# Patient Record
Sex: MALE | Race: WHITE | NOT HISPANIC OR LATINO | Employment: OTHER | URBAN - NONMETROPOLITAN AREA
[De-identification: names, ages, dates, MRNs, and addresses within clinical notes are randomized per-mention and may not be internally consistent; named-entity substitution may affect disease eponyms.]

---

## 2017-01-19 DIAGNOSIS — I10 ESSENTIAL HYPERTENSION: Chronic | ICD-10-CM

## 2017-01-19 RX ORDER — METOPROLOL SUCCINATE 25 MG/1
25 TABLET, EXTENDED RELEASE ORAL DAILY
Qty: 90 TABLET | Refills: 3 | Status: SHIPPED | OUTPATIENT
Start: 2017-01-19 | End: 2017-09-11 | Stop reason: SDUPTHER

## 2017-01-19 NOTE — TELEPHONE ENCOUNTER
Patient dosage of the Metoprolol Succ ER is 25mg, to take 1 tablet(s)(25mg) po qday. Patient previously had a 50mg tablet and was taking one-half tab to equal the 25mg dosage.

## 2017-01-24 ENCOUNTER — TRANSCRIBE ORDERS (OUTPATIENT)
Dept: ULTRASOUND IMAGING | Facility: CLINIC | Age: 72
End: 2017-01-24

## 2017-01-24 ENCOUNTER — LAB (OUTPATIENT)
Dept: LAB | Facility: OTHER | Age: 72
End: 2017-01-24

## 2017-01-24 ENCOUNTER — TRANSCRIBE ORDERS (OUTPATIENT)
Dept: LAB | Facility: OTHER | Age: 72
End: 2017-01-24

## 2017-01-24 DIAGNOSIS — Z12.5 SPECIAL SCREENING FOR MALIGNANT NEOPLASM OF PROSTATE: Primary | ICD-10-CM

## 2017-01-24 DIAGNOSIS — N39.0 URINARY TRACT INFECTION, SITE NOT SPECIFIED: ICD-10-CM

## 2017-01-24 DIAGNOSIS — I12.9 HYPERTENSIVE NEPHROPATHY, STAGE 1-4 OR UNSPECIFIED CHRONIC KIDNEY DISEASE: ICD-10-CM

## 2017-01-24 DIAGNOSIS — E78.5 HYPERLIPIDEMIA, UNSPECIFIED HYPERLIPIDEMIA TYPE: ICD-10-CM

## 2017-01-24 DIAGNOSIS — Z12.5 SPECIAL SCREENING FOR MALIGNANT NEOPLASM OF PROSTATE: ICD-10-CM

## 2017-01-24 DIAGNOSIS — N18.30 CHRONIC KIDNEY DISEASE, STAGE III (MODERATE) (HCC): ICD-10-CM

## 2017-01-24 DIAGNOSIS — D64.9 ANEMIA, UNSPECIFIED: ICD-10-CM

## 2017-01-24 DIAGNOSIS — I10 ESSENTIAL HYPERTENSION, MALIGNANT: ICD-10-CM

## 2017-01-24 DIAGNOSIS — I12.9 HYPERTENSIVE NEPHROPATHY, STAGE 1-4 OR UNSPECIFIED CHRONIC KIDNEY DISEASE: Primary | ICD-10-CM

## 2017-01-24 DIAGNOSIS — N18.30 CHRONIC KIDNEY DISEASE, STAGE III (MODERATE) (HCC): Primary | ICD-10-CM

## 2017-01-24 LAB
ALBUMIN SERPL-MCNC: 4 G/DL (ref 3.2–5.5)
ALBUMIN/GLOB SERPL: 1 G/DL (ref 1–3)
ALP SERPL-CCNC: 73 U/L (ref 15–121)
ALT SERPL W P-5'-P-CCNC: 24 U/L (ref 10–60)
ANION GAP SERPL CALCULATED.3IONS-SCNC: 12 MMOL/L (ref 5–15)
AST SERPL-CCNC: 28 U/L (ref 10–60)
BILIRUB SERPL-MCNC: 1 MG/DL (ref 0.2–1)
BUN BLD-MCNC: 29 MG/DL (ref 8–25)
BUN/CREAT SERPL: 19.3 (ref 7–25)
CALCIUM SPEC-SCNC: 9.9 MG/DL (ref 8.4–10.8)
CHLORIDE SERPL-SCNC: 99 MMOL/L (ref 100–112)
CO2 SERPL-SCNC: 30 MMOL/L (ref 20–32)
CREAT BLD-MCNC: 1.5 MG/DL (ref 0.4–1.3)
GFR SERPL CREATININE-BSD FRML MDRD: 46 ML/MIN/1.73 (ref 49–113)
GLOBULIN UR ELPH-MCNC: 3.9 GM/DL (ref 2.5–4.6)
GLUCOSE BLD-MCNC: 109 MG/DL (ref 70–100)
HCT VFR BLD AUTO: 41.2 % (ref 39–49)
HGB BLD-MCNC: 13.4 G/DL (ref 13.7–17.3)
MAGNESIUM SERPL-MCNC: 1.5 MG/DL (ref 1.8–2.5)
PHOSPHATE SERPL-MCNC: 3.4 MG/DL (ref 2.5–4.6)
POTASSIUM BLD-SCNC: 4.1 MMOL/L (ref 3.4–5.4)
PROT SERPL-MCNC: 7.9 G/DL (ref 6.7–8.2)
SODIUM BLD-SCNC: 141 MMOL/L (ref 134–146)
URATE SERPL-MCNC: 10.7 MG/DL (ref 2.6–7.2)

## 2017-01-24 PROCEDURE — 84100 ASSAY OF PHOSPHORUS: CPT | Performed by: INTERNAL MEDICINE

## 2017-01-24 PROCEDURE — 85018 HEMOGLOBIN: CPT | Performed by: INTERNAL MEDICINE

## 2017-01-24 PROCEDURE — 85014 HEMATOCRIT: CPT | Performed by: INTERNAL MEDICINE

## 2017-01-24 PROCEDURE — 84550 ASSAY OF BLOOD/URIC ACID: CPT | Performed by: INTERNAL MEDICINE

## 2017-01-24 PROCEDURE — 82570 ASSAY OF URINE CREATININE: CPT | Performed by: INTERNAL MEDICINE

## 2017-01-24 PROCEDURE — 84155 ASSAY OF PROTEIN SERUM: CPT | Performed by: INTERNAL MEDICINE

## 2017-01-24 PROCEDURE — 84165 PROTEIN E-PHORESIS SERUM: CPT | Performed by: INTERNAL MEDICINE

## 2017-01-24 PROCEDURE — 82306 VITAMIN D 25 HYDROXY: CPT | Performed by: INTERNAL MEDICINE

## 2017-01-24 PROCEDURE — 80053 COMPREHEN METABOLIC PANEL: CPT | Performed by: INTERNAL MEDICINE

## 2017-01-24 PROCEDURE — 84156 ASSAY OF PROTEIN URINE: CPT | Performed by: INTERNAL MEDICINE

## 2017-01-24 PROCEDURE — 84153 ASSAY OF PSA TOTAL: CPT | Performed by: INTERNAL MEDICINE

## 2017-01-24 PROCEDURE — 83735 ASSAY OF MAGNESIUM: CPT | Performed by: INTERNAL MEDICINE

## 2017-01-24 PROCEDURE — 36415 COLL VENOUS BLD VENIPUNCTURE: CPT | Performed by: INTERNAL MEDICINE

## 2017-01-24 PROCEDURE — 83970 ASSAY OF PARATHORMONE: CPT | Performed by: INTERNAL MEDICINE

## 2017-01-25 LAB
25(OH)D3 SERPL-MCNC: 30.6 NG/ML (ref 30–100)
PSA SERPL-MCNC: 1.09 NG/ML (ref 0–4)

## 2017-01-26 LAB
ALBUMIN SERPL-MCNC: 3.8 G/DL (ref 2.9–4.4)
ALBUMIN/GLOB SERPL: 1.1 {RATIO} (ref 0.7–1.7)
ALPHA1 GLOB FLD ELPH-MCNC: 0.2 G/DL (ref 0–0.4)
ALPHA2 GLOB SERPL ELPH-MCNC: 0.9 G/DL (ref 0.4–1)
B-GLOBULIN SERPL ELPH-MCNC: 1.1 G/DL (ref 0.7–1.3)
CREAT 24H UR-MCNC: 134.3 MG/DL
GAMMA GLOB SERPL ELPH-MCNC: 1.4 G/DL (ref 0.4–1.8)
GLOBULIN SER CALC-MCNC: 3.5 G/DL (ref 2.2–3.9)
Lab: ABNORMAL
M-SPIKE: 0.6 G/DL
PROT PATTERN SERPL ELPH-IMP: ABNORMAL
PROT SERPL-MCNC: 7.3 G/DL (ref 6–8.5)
PROT UR-MCNC: 9.8 MG/DL
PROT/CREAT UR: 73 MG/G CREAT (ref 0–200)
PTH-INTACT SERPL-MCNC: 28 PG/ML (ref 15–65)

## 2017-03-10 ENCOUNTER — OFFICE VISIT (OUTPATIENT)
Dept: FAMILY MEDICINE CLINIC | Facility: CLINIC | Age: 72
End: 2017-03-10

## 2017-03-10 VITALS
SYSTOLIC BLOOD PRESSURE: 148 MMHG | HEART RATE: 80 BPM | WEIGHT: 241 LBS | DIASTOLIC BLOOD PRESSURE: 80 MMHG | BODY MASS INDEX: 35.7 KG/M2 | HEIGHT: 69 IN

## 2017-03-10 DIAGNOSIS — K21.9 GASTROESOPHAGEAL REFLUX DISEASE WITHOUT ESOPHAGITIS: Chronic | ICD-10-CM

## 2017-03-10 DIAGNOSIS — R73.02 IMPAIRED GLUCOSE TOLERANCE: Primary | Chronic | ICD-10-CM

## 2017-03-10 DIAGNOSIS — Z11.59 NEED FOR HEPATITIS C SCREENING TEST: ICD-10-CM

## 2017-03-10 DIAGNOSIS — N18.1 CHRONIC RENAL IMPAIRMENT, STAGE 1: Chronic | ICD-10-CM

## 2017-03-10 DIAGNOSIS — E78.2 MIXED HYPERLIPIDEMIA: Chronic | ICD-10-CM

## 2017-03-10 DIAGNOSIS — I10 ESSENTIAL HYPERTENSION: Chronic | ICD-10-CM

## 2017-03-10 PROCEDURE — 99214 OFFICE O/P EST MOD 30 MIN: CPT | Performed by: INTERNAL MEDICINE

## 2017-03-10 RX ORDER — LOSARTAN POTASSIUM AND HYDROCHLOROTHIAZIDE 25; 100 MG/1; MG/1
1 TABLET ORAL DAILY
Qty: 90 TABLET | Refills: 3 | Status: SHIPPED | OUTPATIENT
Start: 2017-03-10 | End: 2017-09-11 | Stop reason: SDUPTHER

## 2017-03-10 RX ORDER — ALLOPURINOL 300 MG/1
1 TABLET ORAL DAILY
Refills: 4 | COMMUNITY
Start: 2017-02-01 | End: 2017-09-11 | Stop reason: SDUPTHER

## 2017-03-10 RX ORDER — FERROUS SULFATE 325(65) MG
325 TABLET ORAL
COMMUNITY

## 2017-03-10 RX ORDER — FLUTICASONE PROPIONATE 50 MCG
2 SPRAY, SUSPENSION (ML) NASAL DAILY PRN
COMMUNITY
End: 2022-07-15 | Stop reason: SDUPTHER

## 2017-03-10 NOTE — PATIENT INSTRUCTIONS

## 2017-03-10 NOTE — PROGRESS NOTES
Subjective   Noe Barnes Sr. is a 71 y.o. male who presents to the office for follow-up and review of labs.  He had some labs performed by his nephrologist in January, but forgot to have his routine labs prior to today's appointment.  He has hypertension and his blood pressure has been controlled, although it is a little elevated today.  He has GERD which is well controlled with Protonix.  He has impaired glucose tolerance and has been monitoring his dietary intake of sugars and carbohydrates.  He has hyperlipidemia and takes pravastatin daily.  He has iron deficient anemia and is supposed to be taking an iron supplement daily.  He has renal insufficiency and follows with nephrology.  He had a nephrology appointment in January.  His renal function is stable.  His magnesium was a little low and he was started on a magnesium supplement.      History of Present Illness     The following portions of the patient's history were reviewed and updated as appropriate: allergies, current medications, past family history, past medical history, past social history, past surgical history and problem list.    Review of Systems   Constitutional: Negative for chills, fatigue and fever.   HENT: Negative for congestion, sneezing, sore throat and trouble swallowing.    Eyes: Negative for visual disturbance.   Respiratory: Negative for cough, chest tightness, shortness of breath and wheezing.    Cardiovascular: Negative for chest pain, palpitations and leg swelling.   Gastrointestinal: Negative for abdominal pain, constipation, diarrhea, nausea and vomiting.   Genitourinary: Negative for dysuria, frequency and urgency.   Musculoskeletal: Negative for neck pain.   Skin: Negative for rash.   Neurological: Negative for dizziness, weakness and headaches.   Psychiatric/Behavioral:        Patient denies any feelings of depression and has not felt down, hopeless or lost interest in any activities.   All other systems reviewed and are  negative.      Objective   Physical Exam   Constitutional: He is oriented to person, place, and time. He appears well-developed and well-nourished. No distress.   HENT:   Head: Normocephalic and atraumatic.   Nose: Nose normal.   Mouth/Throat: Oropharynx is clear and moist. No oropharyngeal exudate.   Eyes: Conjunctivae and EOM are normal. Pupils are equal, round, and reactive to light. No scleral icterus.   Neck: Normal range of motion. Neck supple.   Cardiovascular: Normal rate, regular rhythm and normal heart sounds.  Exam reveals no gallop and no friction rub.    No murmur heard.  Pulmonary/Chest: Effort normal and breath sounds normal. No respiratory distress. He has no wheezes. He has no rales.   Abdominal: Soft. Bowel sounds are normal. He exhibits no distension. There is no tenderness. There is no rebound and no guarding.   Musculoskeletal: Normal range of motion. He exhibits no edema.   Lymphadenopathy:     He has no cervical adenopathy.   Neurological: He is alert and oriented to person, place, and time. No cranial nerve deficit.   Skin: Skin is warm and dry. No rash noted.   Psychiatric: He has a normal mood and affect. His behavior is normal. Judgment and thought content normal.   Nursing note and vitals reviewed.      Assessment/Plan   Noe was seen today for hypertension.    Diagnoses and all orders for this visit:    Impaired glucose tolerance    Essential hypertension  -     losartan-hydrochlorothiazide (HYZAAR) 100-25 MG per tablet; Take 1 tablet by mouth Daily.    Mixed hyperlipidemia    Chronic renal impairment, stage 1    Gastroesophageal reflux disease without esophagitis         Labs are reviewed with patient.  I reviewed labs which he had done in January.  He will come in one day next week to have the remainder of his labs updated.Patient's glucose is slightly elevated.  Patient will continue to watch diet to help maintain control of the glucose.  Patient understands that there is an  increased risk of developing diabetes in the future.  For now he will continue with pravastatin for treatment of his hyperlipidemia.  His creatinine is elevated but stable at 1.5.  He will continue with Protonix for treatment of GERD.  His blood pressure is a little elevated today.  He will continue with his current blood pressure medication.    Patients BMI indicates that he is overweight.  I discussed the importance of weight loss, and have recommended a diet and exercise regimen.      PHQ-9 Depression Screening 3/10/2017   Little interest or pleasure in doing things 0   Feeling down, depressed, or hopeless 0   Trouble falling or staying asleep, or sleeping too much 3   Feeling tired or having little energy 3   Poor appetite or overeating 0   Feeling bad about yourself - or that you are a failure or have let yourself or your family down 0   Trouble concentrating on things, such as reading the newspaper or watching television 0   Moving or speaking so slowly that other people could have noticed. Or the opposite - being so fidgety or restless that you have been moving around a lot more than usual 0   Thoughts that you would be better off dead, or of hurting yourself in some way 0   PHQ-9 Total Score 6   If you checked off any problems, how difficult have these problems made it for you to do your work, take care of things at home, or get along with other people? Not difficult at all         Lab on 01/24/2017   Component Date Value Ref Range Status   • Glucose 01/24/2017 109* 70 - 100 mg/dL Final   • BUN 01/24/2017 29* 8 - 25 mg/dL Final   • Creatinine 01/24/2017 1.50* 0.40 - 1.30 mg/dL Final   • Sodium 01/24/2017 141  134 - 146 mmol/L Final   • Potassium 01/24/2017 4.1  3.4 - 5.4 mmol/L Final   • Chloride 01/24/2017 99* 100 - 112 mmol/L Final   • CO2 01/24/2017 30.0  20.0 - 32.0 mmol/L Final   • Calcium 01/24/2017 9.9  8.4 - 10.8 mg/dL Final   • Total Protein 01/24/2017 7.9  6.7 - 8.2 g/dL Final   • Albumin  01/24/2017 4.00  3.20 - 5.50 g/dL Final   • ALT (SGPT) 01/24/2017 24  10 - 60 U/L Final   • AST (SGOT) 01/24/2017 28  10 - 60 U/L Final   • Alkaline Phosphatase 01/24/2017 73  15 - 121 U/L Final   • Total Bilirubin 01/24/2017 1.0  0.2 - 1.0 mg/dL Final   • eGFR Non  Amer 01/24/2017 46* 49 - 113 mL/min/1.73 Final   • Globulin 01/24/2017 3.9  2.5 - 4.6 gm/dL Final   • A/G Ratio 01/24/2017 1.0  1.0 - 3.0 g/dL Final   • BUN/Creatinine Ratio 01/24/2017 19.3  7.0 - 25.0 Final   • Anion Gap 01/24/2017 12.0  5.0 - 15.0 mmol/L Final   • Hemoglobin 01/24/2017 13.4* 13.7 - 17.3 g/dL Final   • Hematocrit 01/24/2017 41.2  39.0 - 49.0 % Final   • Magnesium 01/24/2017 1.5* 1.8 - 2.5 mg/dL Final   • Phosphorus 01/24/2017 3.4  2.5 - 4.6 mg/dL Final   • Uric Acid 01/24/2017 10.7* 2.6 - 7.2 mg/dL Final   • 25 Hydroxy, Vitamin D 01/24/2017 30.6  30.0 - 100.0 ng/ml Final   • PTH, Intact 01/24/2017 28  15 - 65 pg/mL Final   • Total Protein 01/24/2017 7.3  6.0 - 8.5 g/dL Final   • Albumin 01/24/2017 3.8  2.9 - 4.4 g/dL Final   • Alpha-1-Globulin 01/24/2017 0.2  0.0 - 0.4 g/dL Final   • Alpha-2-Globulin 01/24/2017 0.9  0.4 - 1.0 g/dL Final   • Beta Globulin 01/24/2017 1.1  0.7 - 1.3 g/dL Final   • Gamma Globulin 01/24/2017 1.4  0.4 - 1.8 g/dL Final   • M-Jose 01/24/2017 0.6* Not Observed g/dL Final   • Globulin 01/24/2017 3.5  2.2 - 3.9 g/dL Final   • A/G Ratio 01/24/2017 1.1  0.7 - 1.7 Final   • Please note 01/24/2017 Comment   Final    Protein electrophoresis scan will follow via computer, mail, or   delivery.   • SPE Interpretation 01/24/2017 Comment   Final    The SPE pattern demonstrates a single peak (M-spike) in the gamma  region which may represent monoclonal protein. This peak may also be  caused by circulating immune complexes, cryoglobulins, C-reactive  protein, fibrinogen or hemolysis.  If clinically indicated, the  presence of a monoclonal gammopathy may be confirmed by immuno-  fixation, as well as an  evaluation of the urine for the presence of  Bence-Trevino protein.   • Creatinine, Urine 01/24/2017 134.3  Not Estab. mg/dL Final   • Total Protein, Urine 01/24/2017 9.8  Not Estab. mg/dL Final   • Protein/Creatinine Ratio 01/24/2017 73  0 - 200 mg/g creat Final   • PSA 01/24/2017 1.090  0.000 - 4.000 ng/mL Final   ]

## 2017-03-14 PROCEDURE — 80074 ACUTE HEPATITIS PANEL: CPT | Performed by: INTERNAL MEDICINE

## 2017-03-15 ENCOUNTER — OFFICE VISIT (OUTPATIENT)
Dept: SURGERY | Facility: CLINIC | Age: 72
End: 2017-03-15

## 2017-03-15 VITALS
HEIGHT: 68 IN | SYSTOLIC BLOOD PRESSURE: 162 MMHG | DIASTOLIC BLOOD PRESSURE: 78 MMHG | BODY MASS INDEX: 36.68 KG/M2 | WEIGHT: 242 LBS

## 2017-03-15 DIAGNOSIS — K63.5 COLON POLYPS: Primary | ICD-10-CM

## 2017-03-15 DIAGNOSIS — K64.4 SKIN TAGS, ANUS OR RECTUM: ICD-10-CM

## 2017-03-15 PROCEDURE — 99212 OFFICE O/P EST SF 10 MIN: CPT | Performed by: SURGERY

## 2017-03-15 NOTE — PROGRESS NOTES
See prior note.  Patient comes back for repeat anal exam revealed what was felt to be likely benign anal skin tags.  Patient is doing well.  He also had a is adenomatous polyp removed on colonoscopy 3 months ago and he is set to have repeat colonoscopy a year out for that.  He comes in today just allow us to take a look at his anus in to confirm these skin tags.  He has no complaints.  His perianal area significant for what appeared to be some benign skin tags.  No ulcerations or concerning lesions appreciated otherwise.  Patient will follow up with us in 9 months or sooner as a further concerns or questions.

## 2017-03-17 LAB
HAV IGM SERPL QL IA: NEGATIVE
HBV CORE IGM SERPL QL IA: NEGATIVE
HBV SURFACE AG SERPL QL IA: NEGATIVE
HCV AB SER DONR QL: NEGATIVE

## 2017-03-30 ENCOUNTER — TELEPHONE (OUTPATIENT)
Dept: FAMILY MEDICINE CLINIC | Facility: CLINIC | Age: 72
End: 2017-03-30

## 2017-03-30 RX ORDER — NITROFURANTOIN 25; 75 MG/1; MG/1
100 CAPSULE ORAL 2 TIMES DAILY
Qty: 14 CAPSULE | Refills: 0 | Status: SHIPPED | OUTPATIENT
Start: 2017-03-30 | End: 2017-04-24

## 2017-04-17 PROCEDURE — 86666 EHRLICHIA ANTIBODY: CPT | Performed by: PHYSICIAN ASSISTANT

## 2017-04-17 PROCEDURE — 87086 URINE CULTURE/COLONY COUNT: CPT | Performed by: PHYSICIAN ASSISTANT

## 2017-04-17 PROCEDURE — 86618 LYME DISEASE ANTIBODY: CPT | Performed by: PHYSICIAN ASSISTANT

## 2017-04-19 ENCOUNTER — TRANSCRIBE ORDERS (OUTPATIENT)
Dept: GENERAL RADIOLOGY | Facility: CLINIC | Age: 72
End: 2017-04-19

## 2017-04-19 DIAGNOSIS — N39.0 URINARY TRACT INFECTION, SITE NOT SPECIFIED: Primary | ICD-10-CM

## 2017-04-20 ENCOUNTER — LAB (OUTPATIENT)
Dept: LAB | Facility: OTHER | Age: 72
End: 2017-04-20

## 2017-04-20 DIAGNOSIS — N39.0 URINARY TRACT INFECTION, SITE NOT SPECIFIED: ICD-10-CM

## 2017-04-20 LAB
BACTERIA UR QL AUTO: ABNORMAL /HPF
BILIRUB UR QL STRIP: NEGATIVE
CLARITY UR: ABNORMAL
COLOR UR: YELLOW
GLUCOSE UR STRIP-MCNC: NEGATIVE MG/DL
HGB UR QL STRIP.AUTO: ABNORMAL
HYALINE CASTS UR QL AUTO: ABNORMAL /LPF
KETONES UR QL STRIP: NEGATIVE
LEUKOCYTE ESTERASE UR QL STRIP.AUTO: ABNORMAL
MUCOUS THREADS URNS QL MICRO: ABNORMAL /HPF
NITRITE UR QL STRIP: NEGATIVE
PH UR STRIP.AUTO: 5.5 [PH] (ref 5.5–8)
PROT UR QL STRIP: NEGATIVE
RBC # UR: ABNORMAL /HPF
REF LAB TEST METHOD: ABNORMAL
SP GR UR STRIP: 1.02 (ref 1–1.03)
SQUAMOUS #/AREA URNS HPF: ABNORMAL /HPF
UROBILINOGEN UR QL STRIP: ABNORMAL
WBC UR QL AUTO: ABNORMAL /HPF

## 2017-04-20 PROCEDURE — 87086 URINE CULTURE/COLONY COUNT: CPT | Performed by: PHYSICIAN ASSISTANT

## 2017-04-20 PROCEDURE — 81001 URINALYSIS AUTO W/SCOPE: CPT | Performed by: PHYSICIAN ASSISTANT

## 2017-04-22 LAB — BACTERIA SPEC AEROBE CULT: NORMAL

## 2017-04-24 ENCOUNTER — OFFICE VISIT (OUTPATIENT)
Dept: FAMILY MEDICINE CLINIC | Facility: CLINIC | Age: 72
End: 2017-04-24

## 2017-04-24 VITALS
BODY MASS INDEX: 35.55 KG/M2 | WEIGHT: 240 LBS | HEART RATE: 88 BPM | DIASTOLIC BLOOD PRESSURE: 70 MMHG | HEIGHT: 69 IN | SYSTOLIC BLOOD PRESSURE: 138 MMHG | TEMPERATURE: 98.2 F
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
## 2017-04-24 DIAGNOSIS — N39.0 URINARY TRACT INFECTION, SITE UNSPECIFIED: ICD-10-CM

## 2017-04-24 DIAGNOSIS — B37.2 CUTANEOUS CANDIDIASIS: ICD-10-CM

## 2017-04-24 DIAGNOSIS — R60.9 PERIPHERAL EDEMA: ICD-10-CM

## 2017-04-24 PROCEDURE — 99213 OFFICE O/P EST LOW 20 MIN: CPT | Performed by: INTERNAL MEDICINE

## 2017-04-24 RX ORDER — FLUCONAZOLE 100 MG/1
100 TABLET ORAL DAILY
Qty: 7 TABLET | Refills: 0 | Status: SHIPPED | OUTPATIENT
Start: 2017-04-24 | End: 2017-05-01

## 2017-07-31 DIAGNOSIS — I10 ESSENTIAL HYPERTENSION: Chronic | ICD-10-CM

## 2017-07-31 RX ORDER — LOSARTAN POTASSIUM AND HYDROCHLOROTHIAZIDE 25; 100 MG/1; MG/1
TABLET ORAL
Qty: 90 TABLET | Refills: 0 | Status: SHIPPED | OUTPATIENT
Start: 2017-07-31 | End: 2017-09-11 | Stop reason: SDUPTHER

## 2017-08-04 ENCOUNTER — LAB (OUTPATIENT)
Dept: LAB | Facility: OTHER | Age: 72
End: 2017-08-04

## 2017-08-04 ENCOUNTER — TRANSCRIBE ORDERS (OUTPATIENT)
Dept: GENERAL RADIOLOGY | Facility: CLINIC | Age: 72
End: 2017-08-04

## 2017-08-04 DIAGNOSIS — N28.1 ACQUIRED CYST OF KIDNEY: ICD-10-CM

## 2017-08-04 DIAGNOSIS — E83.42 HYPOMAGNESEMIA: ICD-10-CM

## 2017-08-04 DIAGNOSIS — E78.5 HYPERLIPIDEMIA, UNSPECIFIED HYPERLIPIDEMIA TYPE: ICD-10-CM

## 2017-08-04 DIAGNOSIS — I10 ESSENTIAL HYPERTENSION, MALIGNANT: ICD-10-CM

## 2017-08-04 DIAGNOSIS — N39.0 URINARY TRACT INFECTION, SITE NOT SPECIFIED: ICD-10-CM

## 2017-08-04 DIAGNOSIS — N18.9 CHRONIC KIDNEY DISEASE, UNSPECIFIED: ICD-10-CM

## 2017-08-04 DIAGNOSIS — E79.0 URICACIDEMIA: ICD-10-CM

## 2017-08-04 DIAGNOSIS — D64.9 ANEMIA, UNSPECIFIED: ICD-10-CM

## 2017-08-04 DIAGNOSIS — N18.30 CHRONIC KIDNEY DISEASE, STAGE III (MODERATE) (HCC): ICD-10-CM

## 2017-08-04 DIAGNOSIS — I12.9 HYPERTENSIVE NEPHROPATHY, STAGE 1-4 OR UNSPECIFIED CHRONIC KIDNEY DISEASE: ICD-10-CM

## 2017-08-04 DIAGNOSIS — N18.30 CHRONIC KIDNEY DISEASE, STAGE III (MODERATE) (HCC): Primary | ICD-10-CM

## 2017-08-04 LAB
ALBUMIN SERPL-MCNC: 3.7 G/DL (ref 3.2–5.5)
ALBUMIN/GLOB SERPL: 1.1 G/DL (ref 1–3)
ALP SERPL-CCNC: 83 U/L (ref 15–121)
ALT SERPL W P-5'-P-CCNC: 30 U/L (ref 10–60)
ANION GAP SERPL CALCULATED.3IONS-SCNC: 13 MMOL/L (ref 5–15)
AST SERPL-CCNC: 34 U/L (ref 10–60)
BASOPHILS # BLD AUTO: 0.02 10*3/MM3 (ref 0–0.2)
BASOPHILS NFR BLD AUTO: 0.2 % (ref 0–2)
BILIRUB SERPL-MCNC: 1.2 MG/DL (ref 0.2–1)
BUN BLD-MCNC: 25 MG/DL (ref 8–25)
BUN/CREAT SERPL: 16.7 (ref 7–25)
CALCIUM SPEC-SCNC: 9 MG/DL (ref 8.4–10.8)
CHLORIDE SERPL-SCNC: 99 MMOL/L (ref 100–112)
CO2 SERPL-SCNC: 29 MMOL/L (ref 20–32)
CREAT BLD-MCNC: 1.5 MG/DL (ref 0.4–1.3)
CREAT UR-MCNC: 160.6 MG/DL
DEPRECATED RDW RBC AUTO: 52.5 FL (ref 35.1–43.9)
EOSINOPHIL # BLD AUTO: 0.24 10*3/MM3 (ref 0–0.7)
EOSINOPHIL NFR BLD AUTO: 2.8 % (ref 0–7)
ERYTHROCYTE [DISTWIDTH] IN BLOOD BY AUTOMATED COUNT: 14.6 % (ref 11.5–14.5)
GFR SERPL CREATININE-BSD FRML MDRD: 46 ML/MIN/1.73 (ref 49–113)
GLOBULIN UR ELPH-MCNC: 3.4 GM/DL (ref 2.5–4.6)
GLUCOSE BLD-MCNC: 108 MG/DL (ref 70–100)
HCT VFR BLD AUTO: 40.4 % (ref 39–49)
HGB BLD-MCNC: 13.3 G/DL (ref 13.7–17.3)
LYMPHOCYTES # BLD AUTO: 3.11 10*3/MM3 (ref 0.6–4.2)
LYMPHOCYTES NFR BLD AUTO: 36.1 % (ref 10–50)
MCH RBC QN AUTO: 33 PG (ref 26.5–34)
MCHC RBC AUTO-ENTMCNC: 32.9 G/DL (ref 31.5–36.3)
MCV RBC AUTO: 100.2 FL (ref 80–98)
MONOCYTES # BLD AUTO: 0.56 10*3/MM3 (ref 0–0.9)
MONOCYTES NFR BLD AUTO: 6.5 % (ref 0–12)
NEUTROPHILS # BLD AUTO: 4.68 10*3/MM3 (ref 2–8.6)
NEUTROPHILS NFR BLD AUTO: 54.4 % (ref 37–80)
PLATELET # BLD AUTO: 247 10*3/MM3 (ref 150–450)
PMV BLD AUTO: 9.3 FL (ref 8–12)
POTASSIUM BLD-SCNC: 4.2 MMOL/L (ref 3.4–5.4)
PROT SERPL-MCNC: 7.1 G/DL (ref 6.7–8.2)
PROT UR-MCNC: 15.5 MG/DL
PROT/CREAT UR: 96.5 MG/G CREA (ref 0–200)
RBC # BLD AUTO: 4.03 10*6/MM3 (ref 4.37–5.74)
SODIUM BLD-SCNC: 141 MMOL/L (ref 134–146)
WBC NRBC COR # BLD: 8.61 10*3/MM3 (ref 3.2–9.8)

## 2017-08-04 PROCEDURE — 82570 ASSAY OF URINE CREATININE: CPT | Performed by: INTERNAL MEDICINE

## 2017-08-04 PROCEDURE — 36415 COLL VENOUS BLD VENIPUNCTURE: CPT | Performed by: INTERNAL MEDICINE

## 2017-08-04 PROCEDURE — 80053 COMPREHEN METABOLIC PANEL: CPT | Performed by: INTERNAL MEDICINE

## 2017-08-04 PROCEDURE — 84156 ASSAY OF PROTEIN URINE: CPT | Performed by: INTERNAL MEDICINE

## 2017-08-04 PROCEDURE — 85025 COMPLETE CBC W/AUTO DIFF WBC: CPT | Performed by: INTERNAL MEDICINE

## 2017-09-05 ENCOUNTER — LAB (OUTPATIENT)
Dept: LAB | Facility: OTHER | Age: 72
End: 2017-09-05

## 2017-09-05 DIAGNOSIS — I10 ESSENTIAL HYPERTENSION: Chronic | ICD-10-CM

## 2017-09-05 DIAGNOSIS — I10 ESSENTIAL HYPERTENSION: ICD-10-CM

## 2017-09-05 DIAGNOSIS — R73.02 IMPAIRED GLUCOSE TOLERANCE: Chronic | ICD-10-CM

## 2017-09-05 DIAGNOSIS — E78.2 MIXED HYPERLIPIDEMIA: Chronic | ICD-10-CM

## 2017-09-05 LAB
ALBUMIN SERPL-MCNC: 4.2 G/DL (ref 3.2–5.5)
ALBUMIN/GLOB SERPL: 1.1 G/DL (ref 1–3)
ALP SERPL-CCNC: 83 U/L (ref 15–121)
ALT SERPL W P-5'-P-CCNC: 33 U/L (ref 10–60)
ANION GAP SERPL CALCULATED.3IONS-SCNC: 13 MMOL/L (ref 5–15)
ARTICHOKE IGE QN: 100 MG/DL (ref 0–129)
AST SERPL-CCNC: 36 U/L (ref 10–60)
BACTERIA UR QL AUTO: ABNORMAL /HPF
BASOPHILS # BLD AUTO: 0.02 10*3/MM3 (ref 0–0.2)
BASOPHILS NFR BLD AUTO: 0.2 % (ref 0–2)
BILIRUB SERPL-MCNC: 1 MG/DL (ref 0.2–1)
BILIRUB UR QL STRIP: NEGATIVE
BUN BLD-MCNC: 33 MG/DL (ref 8–25)
BUN/CREAT SERPL: 22 (ref 7–25)
CALCIUM SPEC-SCNC: 9.5 MG/DL (ref 8.4–10.8)
CHLORIDE SERPL-SCNC: 96 MMOL/L (ref 100–112)
CLARITY UR: ABNORMAL
CO2 SERPL-SCNC: 30 MMOL/L (ref 20–32)
COLOR UR: YELLOW
CREAT BLD-MCNC: 1.5 MG/DL (ref 0.4–1.3)
DEPRECATED RDW RBC AUTO: 52 FL (ref 35.1–43.9)
EOSINOPHIL # BLD AUTO: 0.26 10*3/MM3 (ref 0–0.7)
EOSINOPHIL NFR BLD AUTO: 2.7 % (ref 0–7)
ERYTHROCYTE [DISTWIDTH] IN BLOOD BY AUTOMATED COUNT: 14.5 % (ref 11.5–14.5)
GFR SERPL CREATININE-BSD FRML MDRD: 46 ML/MIN/1.73 (ref 49–113)
GLOBULIN UR ELPH-MCNC: 3.9 GM/DL (ref 2.5–4.6)
GLUCOSE BLD-MCNC: 118 MG/DL (ref 70–100)
GLUCOSE UR STRIP-MCNC: NEGATIVE MG/DL
HBA1C MFR BLD: 6 % (ref 4–5.6)
HCT VFR BLD AUTO: 41.2 % (ref 39–49)
HGB BLD-MCNC: 14.1 G/DL (ref 13.7–17.3)
HGB UR QL STRIP.AUTO: ABNORMAL
HYALINE CASTS UR QL AUTO: ABNORMAL /LPF
KETONES UR QL STRIP: NEGATIVE
LEUKOCYTE ESTERASE UR QL STRIP.AUTO: ABNORMAL
LYMPHOCYTES # BLD AUTO: 3.27 10*3/MM3 (ref 0.6–4.2)
LYMPHOCYTES NFR BLD AUTO: 34.2 % (ref 10–50)
MCH RBC QN AUTO: 33.8 PG (ref 26.5–34)
MCHC RBC AUTO-ENTMCNC: 34.2 G/DL (ref 31.5–36.3)
MCV RBC AUTO: 98.8 FL (ref 80–98)
MONOCYTES # BLD AUTO: 0.76 10*3/MM3 (ref 0–0.9)
MONOCYTES NFR BLD AUTO: 7.9 % (ref 0–12)
MUCOUS THREADS URNS QL MICRO: ABNORMAL /HPF
NEUTROPHILS # BLD AUTO: 5.25 10*3/MM3 (ref 2–8.6)
NEUTROPHILS NFR BLD AUTO: 55 % (ref 37–80)
NITRITE UR QL STRIP: NEGATIVE
PH UR STRIP.AUTO: 5.5 [PH] (ref 5.5–8)
PLATELET # BLD AUTO: 235 10*3/MM3 (ref 150–450)
PMV BLD AUTO: 9.5 FL (ref 8–12)
POTASSIUM BLD-SCNC: 4.9 MMOL/L (ref 3.4–5.4)
PROT SERPL-MCNC: 8.1 G/DL (ref 6.7–8.2)
PROT UR QL STRIP: ABNORMAL
RBC # BLD AUTO: 4.17 10*6/MM3 (ref 4.37–5.74)
RBC # UR: ABNORMAL /HPF
REF LAB TEST METHOD: ABNORMAL
SODIUM BLD-SCNC: 139 MMOL/L (ref 134–146)
SP GR UR STRIP: 1.01 (ref 1–1.03)
SQUAMOUS #/AREA URNS HPF: ABNORMAL /HPF
T4 FREE SERPL-MCNC: 1.35 NG/DL (ref 0.78–2.19)
TSH SERPL DL<=0.05 MIU/L-ACNC: 1.31 MIU/ML (ref 0.46–4.68)
UROBILINOGEN UR QL STRIP: ABNORMAL
WBC NRBC COR # BLD: 9.56 10*3/MM3 (ref 3.2–9.8)
WBC UR QL AUTO: ABNORMAL /HPF

## 2017-09-05 PROCEDURE — 83036 HEMOGLOBIN GLYCOSYLATED A1C: CPT | Performed by: INTERNAL MEDICINE

## 2017-09-05 PROCEDURE — 81001 URINALYSIS AUTO W/SCOPE: CPT | Performed by: INTERNAL MEDICINE

## 2017-09-05 PROCEDURE — 84439 ASSAY OF FREE THYROXINE: CPT | Performed by: INTERNAL MEDICINE

## 2017-09-05 PROCEDURE — 85025 COMPLETE CBC W/AUTO DIFF WBC: CPT | Performed by: INTERNAL MEDICINE

## 2017-09-05 PROCEDURE — 83721 ASSAY OF BLOOD LIPOPROTEIN: CPT | Performed by: INTERNAL MEDICINE

## 2017-09-05 PROCEDURE — 87086 URINE CULTURE/COLONY COUNT: CPT | Performed by: INTERNAL MEDICINE

## 2017-09-05 PROCEDURE — 80053 COMPREHEN METABOLIC PANEL: CPT | Performed by: INTERNAL MEDICINE

## 2017-09-05 PROCEDURE — 36415 COLL VENOUS BLD VENIPUNCTURE: CPT | Performed by: INTERNAL MEDICINE

## 2017-09-05 PROCEDURE — 84443 ASSAY THYROID STIM HORMONE: CPT | Performed by: INTERNAL MEDICINE

## 2017-09-05 RX ORDER — AMLODIPINE BESYLATE 5 MG/1
TABLET ORAL
Qty: 90 TABLET | Refills: 3 | Status: CANCELLED | OUTPATIENT
Start: 2017-09-05

## 2017-09-07 LAB — BACTERIA SPEC AEROBE CULT: NORMAL

## 2017-09-11 ENCOUNTER — OFFICE VISIT (OUTPATIENT)
Dept: FAMILY MEDICINE CLINIC | Facility: CLINIC | Age: 72
End: 2017-09-11

## 2017-09-11 VITALS
DIASTOLIC BLOOD PRESSURE: 80 MMHG | HEIGHT: 68 IN | BODY MASS INDEX: 36.37 KG/M2 | WEIGHT: 240 LBS | SYSTOLIC BLOOD PRESSURE: 138 MMHG | HEART RATE: 74 BPM

## 2017-09-11 DIAGNOSIS — E78.2 MIXED HYPERLIPIDEMIA: Chronic | ICD-10-CM

## 2017-09-11 DIAGNOSIS — M1A.0710 IDIOPATHIC CHRONIC GOUT OF RIGHT FOOT WITHOUT TOPHUS: Chronic | ICD-10-CM

## 2017-09-11 DIAGNOSIS — I10 ESSENTIAL HYPERTENSION: Chronic | ICD-10-CM

## 2017-09-11 DIAGNOSIS — N18.1 CHRONIC RENAL IMPAIRMENT, STAGE 1: Chronic | ICD-10-CM

## 2017-09-11 DIAGNOSIS — R73.02 IMPAIRED GLUCOSE TOLERANCE: Primary | Chronic | ICD-10-CM

## 2017-09-11 DIAGNOSIS — Z12.5 SCREENING FOR PROSTATE CANCER: ICD-10-CM

## 2017-09-11 DIAGNOSIS — K21.9 GASTROESOPHAGEAL REFLUX DISEASE WITHOUT ESOPHAGITIS: Chronic | ICD-10-CM

## 2017-09-11 DIAGNOSIS — B37.2 CUTANEOUS CANDIDIASIS: ICD-10-CM

## 2017-09-11 DIAGNOSIS — D50.8 IRON DEFICIENCY ANEMIA DUE TO DIETARY CAUSES: Chronic | ICD-10-CM

## 2017-09-11 PROBLEM — K64.4 SKIN TAGS, ANUS OR RECTUM: Status: RESOLVED | Noted: 2017-03-15 | Resolved: 2017-09-11

## 2017-09-11 PROCEDURE — 99214 OFFICE O/P EST MOD 30 MIN: CPT | Performed by: INTERNAL MEDICINE

## 2017-09-11 RX ORDER — PRAVASTATIN SODIUM 20 MG
20 TABLET ORAL NIGHTLY
Qty: 90 TABLET | Refills: 3 | Status: SHIPPED | OUTPATIENT
Start: 2017-09-11 | End: 2018-09-17 | Stop reason: SDUPTHER

## 2017-09-11 RX ORDER — LOSARTAN POTASSIUM AND HYDROCHLOROTHIAZIDE 25; 100 MG/1; MG/1
1 TABLET ORAL DAILY
Qty: 90 TABLET | Refills: 3 | Status: SHIPPED | OUTPATIENT
Start: 2017-09-11 | End: 2018-02-23 | Stop reason: ALTCHOICE

## 2017-09-11 RX ORDER — METOPROLOL SUCCINATE 25 MG/1
25 TABLET, EXTENDED RELEASE ORAL DAILY
Qty: 90 TABLET | Refills: 3 | Status: SHIPPED | OUTPATIENT
Start: 2017-09-11 | End: 2018-05-07

## 2017-09-11 RX ORDER — PANTOPRAZOLE SODIUM 40 MG/1
40 TABLET, DELAYED RELEASE ORAL DAILY
Qty: 90 TABLET | Refills: 3 | Status: SHIPPED | OUTPATIENT
Start: 2017-09-11 | End: 2018-09-17 | Stop reason: SDUPTHER

## 2017-09-11 RX ORDER — NYSTATIN AND TRIAMCINOLONE ACETONIDE 100000; 1 [USP'U]/G; MG/G
OINTMENT TOPICAL 2 TIMES DAILY
Qty: 60 G | Refills: 3 | Status: SHIPPED | OUTPATIENT
Start: 2017-09-11 | End: 2018-02-12

## 2017-09-11 RX ORDER — AMLODIPINE BESYLATE 5 MG/1
5 TABLET ORAL DAILY
Qty: 90 TABLET | Refills: 3 | Status: SHIPPED | OUTPATIENT
Start: 2017-09-11 | End: 2018-03-12 | Stop reason: SDUPTHER

## 2017-09-11 RX ORDER — ALLOPURINOL 300 MG/1
300 TABLET ORAL DAILY
Qty: 90 TABLET | Refills: 3 | Status: SHIPPED | OUTPATIENT
Start: 2017-09-11 | End: 2018-02-12

## 2017-09-11 NOTE — PROGRESS NOTES
Chief Complaint   Patient presents with   • Hypertension   • Hyperlipidemia   • redness in groin area     Subjective   Noe Barnes Sr. is a 71 y.o. male who presents to the office for follow-up and review of labs. He has hypertension and his blood pressure has been controlled.  He has GERD which is well controlled with Protonix.  He has impaired glucose tolerance and has been monitoring his dietary intake of sugars and carbohydrates.  He has hyperlipidemia and takes pravastatin daily.  He has iron deficient anemia and takes an iron supplement daily.  He has renal insufficiency and follows with nephrology.  He continues to complain of a rash in the groin area.  I have treated this in the past, and I have also referred him to dermatology.    The following portions of the patient's history were reviewed and updated as appropriate: allergies, current medications, past family history, past medical history, past social history, past surgical history and problem list.    Review of Systems   Constitutional: Negative for chills, fatigue and fever.   HENT: Negative for congestion, sneezing, sore throat and trouble swallowing.    Eyes: Negative for visual disturbance.   Respiratory: Negative for cough, chest tightness, shortness of breath and wheezing.    Cardiovascular: Negative for chest pain, palpitations and leg swelling.   Gastrointestinal: Negative for abdominal pain, constipation, diarrhea, nausea and vomiting.   Genitourinary: Negative for dysuria, frequency and urgency.   Musculoskeletal: Negative for neck pain.   Skin: Positive for rash.   Neurological: Negative for dizziness, weakness and headaches.   Psychiatric/Behavioral:        Patient denies any feelings of depression and has not felt down, hopeless or lost interest in any activities.   All other systems reviewed and are negative.      Objective   Vitals:    09/11/17 1015   BP: 138/80   BP Location: Left arm   Patient Position: Sitting   Cuff Size: Adult  "  Pulse: 74   Weight: 240 lb (109 kg)   Height: 68\" (172.7 cm)   PainSc: 0-No pain     Physical Exam   Constitutional: He is oriented to person, place, and time. He appears well-developed and well-nourished. No distress.   HENT:   Head: Normocephalic and atraumatic.   Nose: Nose normal.   Mouth/Throat: Oropharynx is clear and moist. No oropharyngeal exudate.   Eyes: Conjunctivae and EOM are normal. Pupils are equal, round, and reactive to light. No scleral icterus.   Neck: Normal range of motion. Neck supple.   Cardiovascular: Normal rate, regular rhythm and normal heart sounds.  Exam reveals no gallop and no friction rub.    No murmur heard.  Pulmonary/Chest: Effort normal and breath sounds normal. No respiratory distress. He has no wheezes. He has no rales.   Abdominal: Soft. Bowel sounds are normal. He exhibits no distension. There is no tenderness. There is no rebound and no guarding.   Musculoskeletal: Normal range of motion. He exhibits no edema.   Lymphadenopathy:     He has no cervical adenopathy.   Neurological: He is alert and oriented to person, place, and time. No cranial nerve deficit.   Skin: Skin is warm and dry. No rash noted.   He has several erythematous lesions which have a candidal appearance.   Psychiatric: He has a normal mood and affect. His behavior is normal. Judgment and thought content normal.   Nursing note and vitals reviewed.      Assessment/Plan   Noe was seen today for hypertension, hyperlipidemia and redness in groin area.    Diagnoses and all orders for this visit:    Impaired glucose tolerance  -     Hemoglobin A1c; Future    Essential hypertension  -     Comprehensive Metabolic Panel; Future  -     T4, Free; Future  -     TSH; Future  -     Urinalysis With / Culture If Indicated; Future  -     amLODIPine (NORVASC) 5 MG tablet; Take 1 tablet by mouth Daily.  -     losartan-hydrochlorothiazide (HYZAAR) 100-25 MG per tablet; Take 1 tablet by mouth Daily.  -     metoprolol succinate " XL (TOPROL-XL) 25 MG 24 hr tablet; Take 1 tablet by mouth Daily.    Mixed hyperlipidemia  -     Lipid Panel; Future  -     pravastatin (PRAVACHOL) 20 MG tablet; Take 1 tablet by mouth Every Night.    Chronic renal impairment, stage 1  -     magnesium oxide (MAGOX) 400 (241.3 Mg) MG tablet tablet; Take 1 tablet by mouth Daily.    Gastroesophageal reflux disease without esophagitis  -     pantoprazole (PROTONIX) 40 MG EC tablet; Take 1 tablet by mouth Daily.    Iron deficiency anemia due to dietary causes  -     CBC & Differential; Future    Idiopathic chronic gout of right foot without tophus  -     allopurinol (ZYLOPRIM) 300 MG tablet; Take 1 tablet by mouth Daily.    Screening for prostate cancer  -     PSA Screen; Future    Cutaneous candidiasis  -     nystatin-triamcinolone (MYCOLOG) 261436-6.1 UNIT/GM-% ointment; Apply  topically 2 (Two) Times a Day.         Labs are reviewed with patient. Patient's glucose is slightly elevated.  Patient will continue to watch diet to help maintain control of the glucose.  Patient understands that there is an increased risk of developing diabetes in the future.  His LDL is at goal, and he will continue with pravastatin for treatment of his hyperlipidemia.  His creatinine is elevated but stable at 1.5.  He will continue with Protonix for treatment of GERD.  His blood pressure is controlled so he will continue with his current blood pressure medication.  His hemoglobin and hematocrit have improved from the previous lab.  He will continue to take the daily iron supplement.  He will continue with allopurinol for prophylactic treatment of the gout.    He is given Lotrisone cream to use on the candidal rash in the groin area.  If this does not improve, he may need referral back to dermatology.      PHQ-2/PHQ-9 Depression Screening 9/11/2017   Little interest or pleasure in doing things 1   Feeling down, depressed, or hopeless 0   Trouble falling or staying asleep, or sleeping too much  0   Feeling tired or having little energy 3   Poor appetite or overeating 0   Feeling bad about yourself - or that you are a failure or have let yourself or your family down 0   Trouble concentrating on things, such as reading the newspaper or watching television 0   Moving or speaking so slowly that other people could have noticed. Or the opposite - being so fidgety or restless that you have been moving around a lot more than usual 0   Thoughts that you would be better off dead, or of hurting yourself in some way 0   Total Score 4   If you checked off any problems, how difficult have these problems made it for you to do your work, take care of things at home, or get along with other people? Somewhat difficult         Lab on 09/05/2017   Component Date Value Ref Range Status   • Color, UA 09/05/2017 Yellow  Yellow, Straw Final   • Appearance, UA 09/05/2017 Cloudy* Clear Final   • pH, UA 09/05/2017 5.5  5.5 - 8.0 Final   • Specific Gravity, UA 09/05/2017 1.015  1.005 - 1.030 Final   • Glucose, UA 09/05/2017 Negative  Negative Final   • Ketones, UA 09/05/2017 Negative  Negative Final   • Bilirubin, UA 09/05/2017 Negative  Negative Final   • Blood, UA 09/05/2017 Trace* Negative Final   • Protein, UA 09/05/2017 Trace* Negative Final   • Leuk Esterase, UA 09/05/2017 Small (1+)* Negative Final   • Nitrite, UA 09/05/2017 Negative  Negative Final   • Urobilinogen, UA 09/05/2017 0.2 E.U./dL  0.2 - 1.0 E.U./dL Final   • Glucose 09/05/2017 118* 70 - 100 mg/dL Final   • BUN 09/05/2017 33* 8 - 25 mg/dL Final   • Creatinine 09/05/2017 1.50* 0.40 - 1.30 mg/dL Final   • Sodium 09/05/2017 139  134 - 146 mmol/L Final   • Potassium 09/05/2017 4.9  3.4 - 5.4 mmol/L Final   • Chloride 09/05/2017 96* 100 - 112 mmol/L Final   • CO2 09/05/2017 30.0  20.0 - 32.0 mmol/L Final   • Calcium 09/05/2017 9.5  8.4 - 10.8 mg/dL Final   • Total Protein 09/05/2017 8.1  6.7 - 8.2 g/dL Final   • Albumin 09/05/2017 4.20  3.20 - 5.50 g/dL Final   • ALT  (SGPT) 09/05/2017 33  10 - 60 U/L Final   • AST (SGOT) 09/05/2017 36  10 - 60 U/L Final   • Alkaline Phosphatase 09/05/2017 83  15 - 121 U/L Final   • Total Bilirubin 09/05/2017 1.0  0.2 - 1.0 mg/dL Final   • eGFR Non  Amer 09/05/2017 46* 49 - 113 mL/min/1.73 Final   • Globulin 09/05/2017 3.9  2.5 - 4.6 gm/dL Final   • A/G Ratio 09/05/2017 1.1  1.0 - 3.0 g/dL Final   • BUN/Creatinine Ratio 09/05/2017 22.0  7.0 - 25.0 Final   • Anion Gap 09/05/2017 13.0  5.0 - 15.0 mmol/L Final   • Hemoglobin A1C 09/05/2017 6.0* 4 - 5.6 % Final   • LDL Cholesterol  09/05/2017 100  0 - 129 mg/dL Final   • Free T4 09/05/2017 1.35  0.78 - 2.19 ng/dL Final   • TSH 09/05/2017 1.310  0.460 - 4.680 mIU/mL Final   • WBC 09/05/2017 9.56  3.20 - 9.80 10*3/mm3 Final   • RBC 09/05/2017 4.17* 4.37 - 5.74 10*6/mm3 Final   • Hemoglobin 09/05/2017 14.1  13.7 - 17.3 g/dL Final   • Hematocrit 09/05/2017 41.2  39.0 - 49.0 % Final   • MCV 09/05/2017 98.8* 80.0 - 98.0 fL Final   • MCH 09/05/2017 33.8  26.5 - 34.0 pg Final   • MCHC 09/05/2017 34.2  31.5 - 36.3 g/dL Final   • RDW 09/05/2017 14.5  11.5 - 14.5 % Final   • RDW-SD 09/05/2017 52.0* 35.1 - 43.9 fl Final   • MPV 09/05/2017 9.5  8.0 - 12.0 fL Final   • Platelets 09/05/2017 235  150 - 450 10*3/mm3 Final   • Neutrophil % 09/05/2017 55.0  37.0 - 80.0 % Final   • Lymphocyte % 09/05/2017 34.2  10.0 - 50.0 % Final   • Monocyte % 09/05/2017 7.9  0.0 - 12.0 % Final   • Eosinophil % 09/05/2017 2.7  0.0 - 7.0 % Final   • Basophil % 09/05/2017 0.2  0.0 - 2.0 % Final   • Neutrophils, Absolute 09/05/2017 5.25  2.00 - 8.60 10*3/mm3 Final   • Lymphocytes, Absolute 09/05/2017 3.27  0.60 - 4.20 10*3/mm3 Final   • Monocytes, Absolute 09/05/2017 0.76  0.00 - 0.90 10*3/mm3 Final   • Eosinophils, Absolute 09/05/2017 0.26  0.00 - 0.70 10*3/mm3 Final   • Basophils, Absolute 09/05/2017 0.02  0.00 - 0.20 10*3/mm3 Final   • RBC, UA 09/05/2017 3-5* None Seen /HPF Final   • WBC, UA 09/05/2017 13-20* None Seen /HPF  Final   • Bacteria, UA 09/05/2017 2+* None Seen /HPF Final   • Squamous Epithelial Cells, UA 09/05/2017 3-6* None Seen, 0-2 /HPF Final   • Hyaline Casts, UA 09/05/2017 None Seen  None Seen /LPF Final   • Mucus, UA 09/05/2017 Small/1+* None Seen, Trace /HPF Final   • Methodology 09/05/2017 Manual Light Microscopy   Final   • Urine Culture 09/05/2017 Mixed Culture   Final   Lab on 08/04/2017   Component Date Value Ref Range Status   • Glucose 08/04/2017 108* 70 - 100 mg/dL Final   • BUN 08/04/2017 25  8 - 25 mg/dL Final   • Creatinine 08/04/2017 1.50* 0.40 - 1.30 mg/dL Final   • Sodium 08/04/2017 141  134 - 146 mmol/L Final   • Potassium 08/04/2017 4.2  3.4 - 5.4 mmol/L Final   • Chloride 08/04/2017 99* 100 - 112 mmol/L Final   • CO2 08/04/2017 29.0  20.0 - 32.0 mmol/L Final   • Calcium 08/04/2017 9.0  8.4 - 10.8 mg/dL Final   • Total Protein 08/04/2017 7.1  6.7 - 8.2 g/dL Final   • Albumin 08/04/2017 3.70  3.20 - 5.50 g/dL Final   • ALT (SGPT) 08/04/2017 30  10 - 60 U/L Final   • AST (SGOT) 08/04/2017 34  10 - 60 U/L Final   • Alkaline Phosphatase 08/04/2017 83  15 - 121 U/L Final   • Total Bilirubin 08/04/2017 1.2* 0.2 - 1.0 mg/dL Final   • eGFR Non African Amer 08/04/2017 46* 49 - 113 mL/min/1.73 Final   • Globulin 08/04/2017 3.4  2.5 - 4.6 gm/dL Final   • A/G Ratio 08/04/2017 1.1  1.0 - 3.0 g/dL Final   • BUN/Creatinine Ratio 08/04/2017 16.7  7.0 - 25.0 Final   • Anion Gap 08/04/2017 13.0  5.0 - 15.0 mmol/L Final   • Protein/Creatinine Ratio, Urine 08/04/2017 96.5  0.0 - 200.0 mg/G Crea Final   • Creatinine, Urine 08/04/2017 160.6  mg/dL Final   • Total Protein, Urine 08/04/2017 15.5  mg/dL Final   • WBC 08/04/2017 8.61  3.20 - 9.80 10*3/mm3 Final   • RBC 08/04/2017 4.03* 4.37 - 5.74 10*6/mm3 Final   • Hemoglobin 08/04/2017 13.3* 13.7 - 17.3 g/dL Final   • Hematocrit 08/04/2017 40.4  39.0 - 49.0 % Final   • MCV 08/04/2017 100.2* 80.0 - 98.0 fL Final   • MCH 08/04/2017 33.0  26.5 - 34.0 pg Final   • MCHC 08/04/2017  32.9  31.5 - 36.3 g/dL Final   • RDW 08/04/2017 14.6* 11.5 - 14.5 % Final   • RDW-SD 08/04/2017 52.5* 35.1 - 43.9 fl Final   • MPV 08/04/2017 9.3  8.0 - 12.0 fL Final   • Platelets 08/04/2017 247  150 - 450 10*3/mm3 Final   • Neutrophil % 08/04/2017 54.4  37.0 - 80.0 % Final   • Lymphocyte % 08/04/2017 36.1  10.0 - 50.0 % Final   • Monocyte % 08/04/2017 6.5  0.0 - 12.0 % Final   • Eosinophil % 08/04/2017 2.8  0.0 - 7.0 % Final   • Basophil % 08/04/2017 0.2  0.0 - 2.0 % Final   • Neutrophils, Absolute 08/04/2017 4.68  2.00 - 8.60 10*3/mm3 Final   • Lymphocytes, Absolute 08/04/2017 3.11  0.60 - 4.20 10*3/mm3 Final   • Monocytes, Absolute 08/04/2017 0.56  0.00 - 0.90 10*3/mm3 Final   • Eosinophils, Absolute 08/04/2017 0.24  0.00 - 0.70 10*3/mm3 Final   • Basophils, Absolute 08/04/2017 0.02  0.00 - 0.20 10*3/mm3 Final   ]

## 2017-10-09 DIAGNOSIS — E78.2 MIXED HYPERLIPIDEMIA: Chronic | ICD-10-CM

## 2017-10-09 DIAGNOSIS — K21.9 GASTROESOPHAGEAL REFLUX DISEASE WITHOUT ESOPHAGITIS: Chronic | ICD-10-CM

## 2017-10-09 RX ORDER — PRAVASTATIN SODIUM 20 MG
TABLET ORAL
Qty: 90 TABLET | Refills: 3 | Status: SHIPPED | OUTPATIENT
Start: 2017-10-09 | End: 2017-12-15 | Stop reason: SDUPTHER

## 2017-10-09 RX ORDER — PANTOPRAZOLE SODIUM 40 MG/1
TABLET, DELAYED RELEASE ORAL
Qty: 90 TABLET | Refills: 3 | Status: SHIPPED | OUTPATIENT
Start: 2017-10-09 | End: 2017-12-15 | Stop reason: SDUPTHER

## 2017-10-16 PROCEDURE — 87086 URINE CULTURE/COLONY COUNT: CPT | Performed by: PHYSICIAN ASSISTANT

## 2017-12-13 ENCOUNTER — OFFICE VISIT (OUTPATIENT)
Dept: SURGERY | Facility: CLINIC | Age: 72
End: 2017-12-13

## 2017-12-13 VITALS
DIASTOLIC BLOOD PRESSURE: 84 MMHG | SYSTOLIC BLOOD PRESSURE: 142 MMHG | BODY MASS INDEX: 36.37 KG/M2 | WEIGHT: 240 LBS | HEIGHT: 68 IN

## 2017-12-13 DIAGNOSIS — Z86.010 HISTORY OF ADENOMATOUS POLYP OF COLON: Primary | ICD-10-CM

## 2017-12-13 PROCEDURE — 99213 OFFICE O/P EST LOW 20 MIN: CPT | Performed by: SURGERY

## 2017-12-13 RX ORDER — FINASTERIDE 5 MG/1
1 TABLET, FILM COATED ORAL DAILY
COMMUNITY
Start: 2017-11-21 | End: 2019-06-03 | Stop reason: SDUPTHER

## 2017-12-13 RX ORDER — DEXTROSE AND SODIUM CHLORIDE 5; .45 G/100ML; G/100ML
100 INJECTION, SOLUTION INTRAVENOUS CONTINUOUS
Status: CANCELLED | OUTPATIENT
Start: 2017-12-18

## 2017-12-13 NOTE — PROGRESS NOTES
71-year-old gentleman returns to be set up for his follow-up colonoscopy.  He is 1 year out from a colonoscopy which removed a 10 mm pedunculated tubular adenomatous polyp removed from his sigmoid colon.  He was also noted to have diverticulosis.  Recommend annual follow-up colonoscopy at this time.  No GI complaints.  He is known benign-appearing skin tags around his anus.  Allergies:   Allergies   Allergen Reactions   • Sulfa Antibiotics      itching   • Fluconazole Rash         Home Medications:  Prior to Admission medications    Medication Sig Start Date End Date Taking? Authorizing Provider   allopurinol (ZYLOPRIM) 300 MG tablet Take 1 tablet by mouth Daily. 9/11/17  Yes Elia Herron MD   amLODIPine (NORVASC) 5 MG tablet Take 1 tablet by mouth Daily. 9/11/17  Yes Elia Herron MD   ferrous sulfate 325 (65 FE) MG tablet Take 325 mg by mouth Daily With Breakfast.   Yes Historical Provider, MD   finasteride (PROSCAR) 5 MG tablet Take 1 tablet by mouth Daily. 11/21/17  Yes Historical Provider, MD   fluticasone (FLONASE) 50 MCG/ACT nasal spray 2 sprays into each nostril Daily.   Yes Historical Provider, MD   losartan-hydrochlorothiazide (HYZAAR) 100-25 MG per tablet Take 1 tablet by mouth Daily. 9/11/17  Yes Eila Herron MD   magnesium oxide (MAGOX) 400 (241.3 Mg) MG tablet tablet Take 1 tablet by mouth Daily. 9/11/17  Yes Elia Herron MD   metoprolol succinate XL (TOPROL-XL) 25 MG 24 hr tablet Take 1 tablet by mouth Daily. 9/11/17  Yes Elia Herron MD   nystatin-triamcinolone (MYCOLOG) 885664-0.1 UNIT/GM-% ointment Apply  topically 2 (Two) Times a Day. 9/11/17  Yes Elia Herron MD   pantoprazole (PROTONIX) 40 MG EC tablet Take 1 tablet by mouth Daily. 9/11/17  Yes Elia Herron MD   pantoprazole (PROTONIX) 40 MG EC tablet TAKE 1 TABLET BY MOUTH DAILY 10/9/17  Yes Elia Herron MD   pravastatin (PRAVACHOL) 20 MG tablet Take 1 tablet by mouth Every Night. 9/11/17  Yes Elia Herron MD    pravastatin (PRAVACHOL) 20 MG tablet TAKE 1 TABLET BY MOUTH EVERY NIGHT 10/9/17  Yes Elia Herron MD   colchicine 0.6 MG tablet Take 1 tablet by mouth 2 (Two) Times a Day. 5/30/17   Chavez Rizzo MD       Social History     Social History   • Marital status:      Spouse name: N/A   • Number of children: N/A   • Years of education: N/A     Occupational History   • Not on file.     Social History Main Topics   • Smoking status: Former Smoker     Quit date: 05/2015   • Smokeless tobacco: Never Used   • Alcohol use No   • Drug use: No   • Sexual activity: Defer     Other Topics Concern   • Not on file     Social History Narrative       Past Medical History:   Diagnosis Date   • Acute bronchitis    • Acute urinary tract infection    • Candidal balanitis    • Dysfunction of eustachian tube    • Essential hypertension    • Gastroesophageal reflux disease without esophagitis    • Hypertriglyceridemia    • Impaired glucose tolerance    • Iron deficiency anemia due to dietary causes    • Mixed hyperlipidemia    • Pruritic rash     Resolved   • Skin lesion of scalp    • Tick bite    • Upper respiratory infection        Family History   Problem Relation Age of Onset   • Diabetes Mother    • Heart disease Mother    • Stomach cancer Father    • Heart disease Brother    • Cancer Other      other       Past Surgical History:   Procedure Laterality Date   • CIRCUMCISION  12/2009    CIRCUMCISION W/REGIONL BLOCK 43627 (1)      • COLONOSCOPY  11/30/2016   • INJECTION OF MEDICATION  01/15/2016    Depo Medrol (Methylprednisone) 80mg (1)       Alert and appropriate nontoxic-appearing  Lungs clear heart regular rate and rhythm  Abdomen protuberant but soft  Extremities unremarkable  Skin warm and dry    Assessment and plan  History of villotubular adenomatous polyp removed from his sigmoid colon a year ago.  Agree that he does need follow-up colonoscopy.  I went over the procedure with him along with the prep and  discussed the importance of the prep with him.  He understands the procedure alternatives risk and benefits and wishes to proceed

## 2017-12-18 ENCOUNTER — HOSPITAL ENCOUNTER (OUTPATIENT)
Facility: HOSPITAL | Age: 72
Setting detail: HOSPITAL OUTPATIENT SURGERY
Discharge: HOME | End: 2017-12-18
Attending: SURGERY | Admitting: SURGERY

## 2017-12-18 ENCOUNTER — ANESTHESIA (OUTPATIENT)
Dept: GASTROENTEROLOGY | Facility: HOSPITAL | Age: 72
End: 2017-12-18

## 2017-12-18 ENCOUNTER — ANESTHESIA EVENT (OUTPATIENT)
Dept: GASTROENTEROLOGY | Facility: HOSPITAL | Age: 72
End: 2017-12-18

## 2017-12-18 VITALS
SYSTOLIC BLOOD PRESSURE: 152 MMHG | OXYGEN SATURATION: 96 % | RESPIRATION RATE: 18 BRPM | TEMPERATURE: 96.8 F | WEIGHT: 238.1 LBS | HEART RATE: 68 BPM | DIASTOLIC BLOOD PRESSURE: 74 MMHG | BODY MASS INDEX: 36.09 KG/M2 | HEIGHT: 68 IN

## 2017-12-18 DIAGNOSIS — Z86.010 HISTORY OF ADENOMATOUS POLYP OF COLON: ICD-10-CM

## 2017-12-18 PROCEDURE — 25010000002 MIDAZOLAM PER 1 MG: Performed by: NURSE ANESTHETIST, CERTIFIED REGISTERED

## 2017-12-18 PROCEDURE — 88305 TISSUE EXAM BY PATHOLOGIST: CPT | Performed by: PATHOLOGY

## 2017-12-18 PROCEDURE — 88305 TISSUE EXAM BY PATHOLOGIST: CPT | Performed by: SURGERY

## 2017-12-18 PROCEDURE — 45385 COLONOSCOPY W/LESION REMOVAL: CPT | Performed by: SURGERY

## 2017-12-18 PROCEDURE — 25010000002 PROPOFOL 10 MG/ML EMULSION: Performed by: NURSE ANESTHETIST, CERTIFIED REGISTERED

## 2017-12-18 DEVICE — CLIPPING DEVICE
Type: IMPLANTABLE DEVICE | Site: RECTUM | Status: FUNCTIONAL
Brand: RESOLUTION CLIP

## 2017-12-18 RX ORDER — DEXTROSE AND SODIUM CHLORIDE 5; .45 G/100ML; G/100ML
100 INJECTION, SOLUTION INTRAVENOUS CONTINUOUS
Status: DISCONTINUED | OUTPATIENT
Start: 2017-12-18 | End: 2017-12-18 | Stop reason: HOSPADM

## 2017-12-18 RX ORDER — PROPOFOL 10 MG/ML
VIAL (ML) INTRAVENOUS AS NEEDED
Status: DISCONTINUED | OUTPATIENT
Start: 2017-12-18 | End: 2017-12-18 | Stop reason: SURG

## 2017-12-18 RX ORDER — MIDAZOLAM HYDROCHLORIDE 1 MG/ML
INJECTION INTRAMUSCULAR; INTRAVENOUS AS NEEDED
Status: DISCONTINUED | OUTPATIENT
Start: 2017-12-18 | End: 2017-12-18 | Stop reason: SURG

## 2017-12-18 RX ADMIN — PROPOFOL 20 MG: 10 INJECTION, EMULSION INTRAVENOUS at 09:06

## 2017-12-18 RX ADMIN — MIDAZOLAM 1 MG: 1 INJECTION INTRAMUSCULAR; INTRAVENOUS at 08:52

## 2017-12-18 RX ADMIN — PROPOFOL 20 MG: 10 INJECTION, EMULSION INTRAVENOUS at 09:02

## 2017-12-18 RX ADMIN — PROPOFOL 30 MG: 10 INJECTION, EMULSION INTRAVENOUS at 09:14

## 2017-12-18 RX ADMIN — PROPOFOL 70 MG: 10 INJECTION, EMULSION INTRAVENOUS at 08:54

## 2017-12-18 RX ADMIN — PROPOFOL 20 MG: 10 INJECTION, EMULSION INTRAVENOUS at 08:57

## 2017-12-18 RX ADMIN — PROPOFOL 20 MG: 10 INJECTION, EMULSION INTRAVENOUS at 08:58

## 2017-12-18 RX ADMIN — PROPOFOL 20 MG: 10 INJECTION, EMULSION INTRAVENOUS at 08:59

## 2017-12-18 RX ADMIN — PROPOFOL 20 MG: 10 INJECTION, EMULSION INTRAVENOUS at 08:56

## 2017-12-18 RX ADMIN — DEXTROSE AND SODIUM CHLORIDE 100 ML/HR: 5; 450 INJECTION, SOLUTION INTRAVENOUS at 08:40

## 2017-12-18 RX ADMIN — PROPOFOL 20 MG: 10 INJECTION, EMULSION INTRAVENOUS at 09:08

## 2017-12-18 NOTE — ANESTHESIA PREPROCEDURE EVALUATION
Anesthesia Evaluation     no history of anesthetic complications:  NPO Solid Status: > 8 hours  NPO Liquid Status: > 2 hours     Airway   Mallampati: III  TM distance: >3 FB  Neck ROM: full  no difficulty expected  Dental          Pulmonary - normal exam   (+) a smoker Former,   Cardiovascular - normal exam    (+) hypertension poorly controlled, hyperlipidemia      Neuro/Psych  GI/Hepatic/Renal/Endo    (+) morbid obesity, GERD,     Musculoskeletal     Abdominal    Substance History      OB/GYN          Other   (+) arthritis                                     Anesthesia Plan    ASA 3     MAC     intravenous induction   Anesthetic plan and risks discussed with patient.

## 2017-12-18 NOTE — PLAN OF CARE
Problem: Patient Care Overview (Adult)  Goal: Plan of Care Review  Outcome: Outcome(s) achieved Date Met:  12/18/17 12/18/17 0926   Coping/Psychosocial Response Interventions   Plan Of Care Reviewed With patient   Patient Care Overview   Progress no change   Outcome Evaluation   Outcome Summary/Follow up Plan vss         Problem: GI Endoscopy (Adult)  Goal: Signs and Symptoms of Listed Potential Problems Will be Absent or Manageable (GI Endoscopy)  Outcome: Outcome(s) achieved Date Met:  12/18/17 12/18/17 0926   GI Endoscopy   Problems Assessed (GI Endoscopy) all   Problems Present (GI Endoscopy) none

## 2017-12-18 NOTE — PLAN OF CARE
Problem: Patient Care Overview (Adult)  Goal: Plan of Care Review  Outcome: Ongoing (interventions implemented as appropriate)    12/18/17 0920   Coping/Psychosocial Response Interventions   Plan Of Care Reviewed With patient   Patient Care Overview   Progress no change   Outcome Evaluation   Outcome Summary/Follow up Plan vss         Problem: GI Endoscopy (Adult)  Goal: Signs and Symptoms of Listed Potential Problems Will be Absent or Manageable (GI Endoscopy)  Outcome: Ongoing (interventions implemented as appropriate)    12/18/17 0920   GI Endoscopy   Problems Assessed (GI Endoscopy) all   Problems Present (GI Endoscopy) none

## 2017-12-18 NOTE — H&P (VIEW-ONLY)
71-year-old gentleman returns to be set up for his follow-up colonoscopy.  He is 1 year out from a colonoscopy which removed a 10 mm pedunculated tubular adenomatous polyp removed from his sigmoid colon.  He was also noted to have diverticulosis.  Recommend annual follow-up colonoscopy at this time.  No GI complaints.  He is known benign-appearing skin tags around his anus.  Allergies:   Allergies   Allergen Reactions   • Sulfa Antibiotics      itching   • Fluconazole Rash         Home Medications:  Prior to Admission medications    Medication Sig Start Date End Date Taking? Authorizing Provider   allopurinol (ZYLOPRIM) 300 MG tablet Take 1 tablet by mouth Daily. 9/11/17  Yes Elia Herron MD   amLODIPine (NORVASC) 5 MG tablet Take 1 tablet by mouth Daily. 9/11/17  Yes Elia Herron MD   ferrous sulfate 325 (65 FE) MG tablet Take 325 mg by mouth Daily With Breakfast.   Yes Historical Provider, MD   finasteride (PROSCAR) 5 MG tablet Take 1 tablet by mouth Daily. 11/21/17  Yes Historical Provider, MD   fluticasone (FLONASE) 50 MCG/ACT nasal spray 2 sprays into each nostril Daily.   Yes Historical Provider, MD   losartan-hydrochlorothiazide (HYZAAR) 100-25 MG per tablet Take 1 tablet by mouth Daily. 9/11/17  Yes Elia Herron MD   magnesium oxide (MAGOX) 400 (241.3 Mg) MG tablet tablet Take 1 tablet by mouth Daily. 9/11/17  Yes Elia Herron MD   metoprolol succinate XL (TOPROL-XL) 25 MG 24 hr tablet Take 1 tablet by mouth Daily. 9/11/17  Yes Elia eHrron MD   nystatin-triamcinolone (MYCOLOG) 366515-6.1 UNIT/GM-% ointment Apply  topically 2 (Two) Times a Day. 9/11/17  Yes Elia Herron MD   pantoprazole (PROTONIX) 40 MG EC tablet Take 1 tablet by mouth Daily. 9/11/17  Yes Elia Herron MD   pantoprazole (PROTONIX) 40 MG EC tablet TAKE 1 TABLET BY MOUTH DAILY 10/9/17  Yes Elia Herron MD   pravastatin (PRAVACHOL) 20 MG tablet Take 1 tablet by mouth Every Night. 9/11/17  Yes Elia Herron MD    pravastatin (PRAVACHOL) 20 MG tablet TAKE 1 TABLET BY MOUTH EVERY NIGHT 10/9/17  Yes Elia Herron MD   colchicine 0.6 MG tablet Take 1 tablet by mouth 2 (Two) Times a Day. 5/30/17   Chavez Rizzo MD       Social History     Social History   • Marital status:      Spouse name: N/A   • Number of children: N/A   • Years of education: N/A     Occupational History   • Not on file.     Social History Main Topics   • Smoking status: Former Smoker     Quit date: 05/2015   • Smokeless tobacco: Never Used   • Alcohol use No   • Drug use: No   • Sexual activity: Defer     Other Topics Concern   • Not on file     Social History Narrative       Past Medical History:   Diagnosis Date   • Acute bronchitis    • Acute urinary tract infection    • Candidal balanitis    • Dysfunction of eustachian tube    • Essential hypertension    • Gastroesophageal reflux disease without esophagitis    • Hypertriglyceridemia    • Impaired glucose tolerance    • Iron deficiency anemia due to dietary causes    • Mixed hyperlipidemia    • Pruritic rash     Resolved   • Skin lesion of scalp    • Tick bite    • Upper respiratory infection        Family History   Problem Relation Age of Onset   • Diabetes Mother    • Heart disease Mother    • Stomach cancer Father    • Heart disease Brother    • Cancer Other      other       Past Surgical History:   Procedure Laterality Date   • CIRCUMCISION  12/2009    CIRCUMCISION W/REGIONL BLOCK 03459 (1)      • COLONOSCOPY  11/30/2016   • INJECTION OF MEDICATION  01/15/2016    Depo Medrol (Methylprednisone) 80mg (1)       Alert and appropriate nontoxic-appearing  Lungs clear heart regular rate and rhythm  Abdomen protuberant but soft  Extremities unremarkable  Skin warm and dry    Assessment and plan  History of villotubular adenomatous polyp removed from his sigmoid colon a year ago.  Agree that he does need follow-up colonoscopy.  I went over the procedure with him along with the prep and  discussed the importance of the prep with him.  He understands the procedure alternatives risk and benefits and wishes to proceed

## 2017-12-18 NOTE — ANESTHESIA POSTPROCEDURE EVALUATION
Patient: Noe Barnes Sr.    Procedure Summary     Date Anesthesia Start Anesthesia Stop Room / Location    12/18/17 0851 0918 [facility] ENDOSCOPY 2 / [facility] ENDOSCOPY       Procedure Diagnosis Surgeon Provider    COLONOSCOPY (N/A ) History of adenomatous polyp of colon  (History of adenomatous polyp of colon [Z86.010]) MD Yola Rosado CRNA          Anesthesia Type: MAC  Last vitals  BP   (!) 199/98 (12/18/17 0839)   Temp   97.1 °F (36.2 °C) (12/18/17 0839)   Pulse   89 (12/18/17 0839)   Resp   18 (12/18/17 0839)     SpO2   92 % (12/18/17 0839)     Post Anesthesia Care and Evaluation    Patient location during evaluation: bedside  Patient participation: complete - patient participated  Level of consciousness: awake and alert  Pain management: adequate  Airway patency: patent  Anesthetic complications: No anesthetic complications  PONV Status: none  Cardiovascular status: acceptable  Respiratory status: acceptable  Hydration status: acceptable

## 2017-12-19 LAB
LAB AP CASE REPORT: NORMAL
Lab: NORMAL
PATH REPORT.FINAL DX SPEC: NORMAL
PATH REPORT.GROSS SPEC: NORMAL

## 2018-01-02 ENCOUNTER — OFFICE VISIT (OUTPATIENT)
Dept: SURGERY | Facility: CLINIC | Age: 73
End: 2018-01-02

## 2018-01-02 VITALS
BODY MASS INDEX: 35.77 KG/M2 | WEIGHT: 236 LBS | SYSTOLIC BLOOD PRESSURE: 130 MMHG | HEIGHT: 68 IN | DIASTOLIC BLOOD PRESSURE: 82 MMHG

## 2018-01-02 DIAGNOSIS — Z86.010 HISTORY OF ADENOMATOUS POLYP OF COLON: Primary | ICD-10-CM

## 2018-01-02 PROCEDURE — 99212 OFFICE O/P EST SF 10 MIN: CPT | Performed by: SURGERY

## 2018-01-02 NOTE — PROGRESS NOTES
72-year-old gentleman here to return after recent colonoscopy.  Discussed colonoscopy was done for history of a prior large villotubular colon polyp removed from the sigmoid colon.  On this colonoscopy patient had a 8 mm villotubular adenoma polyp removed from his rectum.  Patient also had diverticulosis noted on his colonoscopy and his prep was adequate to identify polyps.  Recommended this patient may return to clinic in 1 year and he should undergo another colonoscopy at that time.  Patient  will return sooner if he has any other concerns or questions

## 2018-02-07 ENCOUNTER — LAB (OUTPATIENT)
Dept: LAB | Facility: OTHER | Age: 73
End: 2018-02-07

## 2018-02-07 ENCOUNTER — FLU SHOT (OUTPATIENT)
Dept: FAMILY MEDICINE CLINIC | Facility: CLINIC | Age: 73
End: 2018-02-07

## 2018-02-07 ENCOUNTER — TRANSCRIBE ORDERS (OUTPATIENT)
Dept: LAB | Facility: OTHER | Age: 73
End: 2018-02-07

## 2018-02-07 DIAGNOSIS — I12.9 HYPERTENSIVE NEPHROPATHY: ICD-10-CM

## 2018-02-07 DIAGNOSIS — I10 ESSENTIAL HYPERTENSION, MALIGNANT: ICD-10-CM

## 2018-02-07 DIAGNOSIS — N39.0 URINARY TRACT INFECTION WITHOUT HEMATURIA, SITE UNSPECIFIED: ICD-10-CM

## 2018-02-07 DIAGNOSIS — N18.30 CHRONIC KIDNEY DISEASE, STAGE III (MODERATE) (HCC): Primary | ICD-10-CM

## 2018-02-07 DIAGNOSIS — N18.30 CHRONIC KIDNEY DISEASE, STAGE III (MODERATE) (HCC): ICD-10-CM

## 2018-02-07 DIAGNOSIS — Z23 FLU VACCINE NEED: Primary | ICD-10-CM

## 2018-02-07 LAB
ALBUMIN SERPL-MCNC: 3.8 G/DL (ref 3.2–5.5)
ALBUMIN/GLOB SERPL: 1 G/DL (ref 1–3)
ALP SERPL-CCNC: 82 U/L (ref 15–121)
ALT SERPL W P-5'-P-CCNC: 34 U/L (ref 10–60)
ANION GAP SERPL CALCULATED.3IONS-SCNC: 11 MMOL/L (ref 5–15)
AST SERPL-CCNC: 34 U/L (ref 10–60)
BASOPHILS # BLD AUTO: 0.01 10*3/MM3 (ref 0–0.2)
BASOPHILS NFR BLD AUTO: 0.1 % (ref 0–2)
BILIRUB SERPL-MCNC: 1 MG/DL (ref 0.2–1)
BUN BLD-MCNC: 25 MG/DL (ref 8–25)
BUN/CREAT SERPL: 17.9 (ref 7–25)
CALCIUM SPEC-SCNC: 9.1 MG/DL (ref 8.4–10.8)
CHLORIDE SERPL-SCNC: 96 MMOL/L (ref 100–112)
CO2 SERPL-SCNC: 30 MMOL/L (ref 20–32)
CREAT BLD-MCNC: 1.4 MG/DL (ref 0.4–1.3)
CREAT UR-MCNC: 192.7 MG/DL
DEPRECATED RDW RBC AUTO: 50.2 FL (ref 35.1–43.9)
EOSINOPHIL # BLD AUTO: 0.14 10*3/MM3 (ref 0–0.7)
EOSINOPHIL NFR BLD AUTO: 1.5 % (ref 0–7)
ERYTHROCYTE [DISTWIDTH] IN BLOOD BY AUTOMATED COUNT: 14.3 % (ref 11.5–14.5)
GFR SERPL CREATININE-BSD FRML MDRD: 50 ML/MIN/1.73 (ref 42–98)
GLOBULIN UR ELPH-MCNC: 3.7 GM/DL (ref 2.5–4.6)
GLUCOSE BLD-MCNC: 110 MG/DL (ref 70–100)
HCT VFR BLD AUTO: 41.2 % (ref 39–49)
HGB BLD-MCNC: 13.6 G/DL (ref 13.7–17.3)
LYMPHOCYTES # BLD AUTO: 3.17 10*3/MM3 (ref 0.6–4.2)
LYMPHOCYTES NFR BLD AUTO: 34.6 % (ref 10–50)
MAGNESIUM SERPL-MCNC: 1.4 MG/DL (ref 1.8–2.5)
MCH RBC QN AUTO: 32.7 PG (ref 26.5–34)
MCHC RBC AUTO-ENTMCNC: 33 G/DL (ref 31.5–36.3)
MCV RBC AUTO: 99 FL (ref 80–98)
MONOCYTES # BLD AUTO: 0.7 10*3/MM3 (ref 0–0.9)
MONOCYTES NFR BLD AUTO: 7.6 % (ref 0–12)
NEUTROPHILS # BLD AUTO: 5.15 10*3/MM3 (ref 2–8.6)
NEUTROPHILS NFR BLD AUTO: 56.2 % (ref 37–80)
PLATELET # BLD AUTO: 238 10*3/MM3 (ref 150–450)
PMV BLD AUTO: 8.7 FL (ref 8–12)
POTASSIUM BLD-SCNC: 3.7 MMOL/L (ref 3.4–5.4)
PROT SERPL-MCNC: 7.5 G/DL (ref 6.7–8.2)
PROT UR-MCNC: 11.8 MG/DL
PROT/CREAT UR: 61.2 MG/G CREA (ref 0–200)
RBC # BLD AUTO: 4.16 10*6/MM3 (ref 4.37–5.74)
SODIUM BLD-SCNC: 137 MMOL/L (ref 134–146)
URATE SERPL-MCNC: 6.4 MG/DL (ref 2.6–7.2)
WBC NRBC COR # BLD: 9.17 10*3/MM3 (ref 3.2–9.8)

## 2018-02-07 PROCEDURE — 84550 ASSAY OF BLOOD/URIC ACID: CPT | Performed by: INTERNAL MEDICINE

## 2018-02-07 PROCEDURE — G0008 ADMIN INFLUENZA VIRUS VAC: HCPCS | Performed by: INTERNAL MEDICINE

## 2018-02-07 PROCEDURE — 90662 IIV NO PRSV INCREASED AG IM: CPT | Performed by: INTERNAL MEDICINE

## 2018-02-07 PROCEDURE — 36415 COLL VENOUS BLD VENIPUNCTURE: CPT | Performed by: INTERNAL MEDICINE

## 2018-02-07 PROCEDURE — 82570 ASSAY OF URINE CREATININE: CPT | Performed by: INTERNAL MEDICINE

## 2018-02-07 PROCEDURE — 83735 ASSAY OF MAGNESIUM: CPT | Performed by: INTERNAL MEDICINE

## 2018-02-07 PROCEDURE — 80053 COMPREHEN METABOLIC PANEL: CPT | Performed by: INTERNAL MEDICINE

## 2018-02-07 PROCEDURE — 84156 ASSAY OF PROTEIN URINE: CPT | Performed by: INTERNAL MEDICINE

## 2018-02-07 PROCEDURE — 85025 COMPLETE CBC W/AUTO DIFF WBC: CPT | Performed by: INTERNAL MEDICINE

## 2018-02-12 ENCOUNTER — OFFICE VISIT (OUTPATIENT)
Dept: FAMILY MEDICINE CLINIC | Facility: CLINIC | Age: 73
End: 2018-02-12

## 2018-02-12 VITALS
TEMPERATURE: 97.8 F | HEART RATE: 80 BPM | SYSTOLIC BLOOD PRESSURE: 138 MMHG | DIASTOLIC BLOOD PRESSURE: 70 MMHG | WEIGHT: 237 LBS | BODY MASS INDEX: 35.92 KG/M2 | HEIGHT: 68 IN

## 2018-02-12 DIAGNOSIS — R21 RASH: Primary | ICD-10-CM

## 2018-02-12 PROCEDURE — 99213 OFFICE O/P EST LOW 20 MIN: CPT | Performed by: INTERNAL MEDICINE

## 2018-02-12 RX ORDER — METHYLPREDNISOLONE 4 MG/1
TABLET ORAL
Qty: 1 EACH | Refills: 0 | Status: SHIPPED | OUTPATIENT
Start: 2018-02-12 | End: 2018-03-12

## 2018-02-12 NOTE — PROGRESS NOTES
"Chief Complaint   Patient presents with   • Follow-up     Urgent Care-rash and itching     Subjective   Noe Barnes Sr. is a 72 y.o. male who presents to the office for urgent care follow-up. He was seen last month with a rash.  He was given a Kenalog injection which gave him some relief.  He noticed the rash returning yesterday.  He went back to the urgent care center and it was thought that this could be fungal in etiology.  He was placed on griseofulvin and Tinactin.  He states that the rash looks worse today.  It is mainly affecting the abdomen and extending down onto the thighs.  It has been itching quite a lot.    The following portions of the patient's history were reviewed and updated as appropriate: allergies, current medications, past family history, past medical history, past social history, past surgical history and problem list.    Review of Systems   Constitutional: Negative for chills, fatigue and fever.   HENT: Negative for congestion, sneezing, sore throat and trouble swallowing.    Eyes: Negative for visual disturbance.   Respiratory: Negative for cough, chest tightness, shortness of breath and wheezing.    Cardiovascular: Negative for chest pain, palpitations and leg swelling.   Gastrointestinal: Negative for abdominal pain, constipation, diarrhea, nausea and vomiting.   Genitourinary: Negative for dysuria, frequency and urgency.   Musculoskeletal: Negative for neck pain.   Skin: Positive for rash.   Neurological: Negative for dizziness, weakness and headaches.   Psychiatric/Behavioral:        Patient denies any feelings of depression and has not felt down, hopeless or lost interest in any activities.   All other systems reviewed and are negative.      Objective   Vitals:    02/12/18 1456   BP: 138/70   BP Location: Right arm   Patient Position: Sitting   Cuff Size: Large Adult   Pulse: 80   Temp: 97.8 °F (36.6 °C)   TempSrc: Oral   Weight: 108 kg (237 lb)   Height: 172.7 cm (68\") "     Physical Exam   Constitutional: He is oriented to person, place, and time. He appears well-developed and well-nourished. No distress.   HENT:   Head: Normocephalic and atraumatic.   Nose: Nose normal.   Mouth/Throat: Oropharynx is clear and moist. No oropharyngeal exudate.   Eyes: Conjunctivae and EOM are normal. Pupils are equal, round, and reactive to light. No scleral icterus.   Neck: Normal range of motion. Neck supple.   Cardiovascular: Normal rate, regular rhythm and normal heart sounds.  Exam reveals no gallop and no friction rub.    No murmur heard.  Pulmonary/Chest: Effort normal and breath sounds normal. No respiratory distress. He has no wheezes. He has no rales.   Abdominal: Soft. Bowel sounds are normal. He exhibits no distension. There is no tenderness. There is no rebound and no guarding.   Musculoskeletal: Normal range of motion. He exhibits no edema.   Lymphadenopathy:     He has no cervical adenopathy.   Neurological: He is alert and oriented to person, place, and time. No cranial nerve deficit.   Skin: Skin is warm and dry. Rash noted.   He has a large area of erythema on the abdomen.  There are no distinct lesions noted.   Psychiatric: He has a normal mood and affect. His behavior is normal. Judgment and thought content normal.   Nursing note and vitals reviewed.      Assessment/Plan   Noe was seen today for follow-up.    Diagnoses and all orders for this visit:    Rash  -     MethylPREDNISolone (MEDROL, KAREN,) 4 MG tablet; Take as directed on package instructions.    he will continue with the medication which he was given in the urgent care.  He is also given a Medrol Dosepak to help with the inflammation.  If this does not improve, he will need referral to dermatology.       PHQ-2/PHQ-9 Depression Screening 2/12/2018   Little interest or pleasure in doing things 3   Feeling down, depressed, or hopeless 0   Trouble falling or staying asleep, or sleeping too much 3   Feeling tired or having  little energy 0   Poor appetite or overeating 0   Feeling bad about yourself - or that you are a failure or have let yourself or your family down 0   Trouble concentrating on things, such as reading the newspaper or watching television 0   Moving or speaking so slowly that other people could have noticed. Or the opposite - being so fidgety or restless that you have been moving around a lot more than usual 0   Thoughts that you would be better off dead, or of hurting yourself in some way 0   Total Score 6   If you checked off any problems, how difficult have these problems made it for you to do your work, take care of things at home, or get along with other people? Somewhat difficult

## 2018-02-23 ENCOUNTER — TELEPHONE (OUTPATIENT)
Dept: FAMILY MEDICINE CLINIC | Facility: CLINIC | Age: 73
End: 2018-02-23

## 2018-02-23 RX ORDER — LOSARTAN POTASSIUM 100 MG/1
100 TABLET ORAL DAILY
Qty: 30 TABLET | Refills: 3 | Status: SHIPPED | OUTPATIENT
Start: 2018-02-23 | End: 2018-05-15 | Stop reason: SDUPTHER

## 2018-02-23 NOTE — TELEPHONE ENCOUNTER
"----- Message from Estee Gore LPN sent at 2/23/2018  8:53 AM CST -----  Contact: [phone]  A woman left a voice message stating, \"I need to talk to Fabi, it is about Noe Molly, my number is [phone]\".  "

## 2018-03-02 ENCOUNTER — LAB (OUTPATIENT)
Dept: LAB | Facility: OTHER | Age: 73
End: 2018-03-02

## 2018-03-02 DIAGNOSIS — R73.02 IMPAIRED GLUCOSE TOLERANCE: Chronic | ICD-10-CM

## 2018-03-02 DIAGNOSIS — Z12.5 SCREENING FOR PROSTATE CANCER: ICD-10-CM

## 2018-03-02 DIAGNOSIS — E78.2 MIXED HYPERLIPIDEMIA: Chronic | ICD-10-CM

## 2018-03-02 DIAGNOSIS — D50.8 IRON DEFICIENCY ANEMIA DUE TO DIETARY CAUSES: ICD-10-CM

## 2018-03-02 DIAGNOSIS — I10 ESSENTIAL HYPERTENSION: ICD-10-CM

## 2018-03-02 LAB
ALBUMIN SERPL-MCNC: 3.6 G/DL (ref 3.2–5.5)
ALBUMIN/GLOB SERPL: 1.1 G/DL (ref 1–3)
ALP SERPL-CCNC: 57 U/L (ref 15–121)
ALT SERPL W P-5'-P-CCNC: 46 U/L (ref 10–60)
ANION GAP SERPL CALCULATED.3IONS-SCNC: 11 MMOL/L (ref 5–15)
ANISOCYTOSIS BLD QL: NORMAL
AST SERPL-CCNC: 27 U/L (ref 10–60)
BACTERIA UR QL AUTO: ABNORMAL /HPF
BILIRUB SERPL-MCNC: 0.9 MG/DL (ref 0.2–1)
BILIRUB UR QL STRIP: NEGATIVE
BUN BLD-MCNC: 23 MG/DL (ref 8–25)
BUN/CREAT SERPL: 20.9 (ref 7–25)
CALCIUM SPEC-SCNC: 9.2 MG/DL (ref 8.4–10.8)
CHLORIDE SERPL-SCNC: 97 MMOL/L (ref 100–112)
CHOLEST SERPL-MCNC: 159 MG/DL (ref 150–200)
CLARITY UR: ABNORMAL
CO2 SERPL-SCNC: 30 MMOL/L (ref 20–32)
COLOR UR: YELLOW
CREAT BLD-MCNC: 1.1 MG/DL (ref 0.4–1.3)
DEPRECATED RDW RBC AUTO: 51.4 FL (ref 35.1–43.9)
EOSINOPHIL # BLD MANUAL: 0.11 10*3/MM3 (ref 0–0.7)
EOSINOPHIL NFR BLD MANUAL: 1 % (ref 0–7)
ERYTHROCYTE [DISTWIDTH] IN BLOOD BY AUTOMATED COUNT: 14.9 % (ref 11.5–14.5)
GFR SERPL CREATININE-BSD FRML MDRD: 66 ML/MIN/1.73 (ref 42–98)
GLOBULIN UR ELPH-MCNC: 3.2 GM/DL (ref 2.5–4.6)
GLUCOSE BLD-MCNC: 80 MG/DL (ref 70–100)
GLUCOSE UR STRIP-MCNC: NEGATIVE MG/DL
HBA1C MFR BLD: 6.4 % (ref 4–5.6)
HCT VFR BLD AUTO: 40.2 % (ref 39–49)
HDLC SERPL-MCNC: 68 MG/DL (ref 35–100)
HGB BLD-MCNC: 13.4 G/DL (ref 13.7–17.3)
HGB UR QL STRIP.AUTO: ABNORMAL
HYALINE CASTS UR QL AUTO: ABNORMAL /LPF
KETONES UR QL STRIP: NEGATIVE
LDLC SERPL CALC-MCNC: 52 MG/DL
LDLC/HDLC SERPL: 0.77 {RATIO}
LEUKOCYTE ESTERASE UR QL STRIP.AUTO: NEGATIVE
LYMPHOCYTES # BLD MANUAL: 4.12 10*3/MM3 (ref 0.6–4.2)
LYMPHOCYTES NFR BLD MANUAL: 38 % (ref 10–50)
LYMPHOCYTES NFR BLD MANUAL: 6 % (ref 0–12)
MCH RBC QN AUTO: 32.9 PG (ref 26.5–34)
MCHC RBC AUTO-ENTMCNC: 33.3 G/DL (ref 31.5–36.3)
MCV RBC AUTO: 98.8 FL (ref 80–98)
MONOCYTES # BLD AUTO: 0.65 10*3/MM3 (ref 0–0.9)
NEUTROPHILS # BLD AUTO: 5.96 10*3/MM3 (ref 2–8.6)
NEUTROPHILS NFR BLD MANUAL: 55 % (ref 37–80)
NITRITE UR QL STRIP: NEGATIVE
PH UR STRIP.AUTO: 6 [PH] (ref 5.5–8)
PLATELET # BLD AUTO: 260 10*3/MM3 (ref 150–450)
PMV BLD AUTO: 8.6 FL (ref 8–12)
POTASSIUM BLD-SCNC: 4.3 MMOL/L (ref 3.4–5.4)
PROT SERPL-MCNC: 6.8 G/DL (ref 6.7–8.2)
PROT UR QL STRIP: NEGATIVE
PSA SERPL-MCNC: 0.44 NG/ML (ref 0–4)
RBC # BLD AUTO: 4.07 10*6/MM3 (ref 4.37–5.74)
RBC # UR: ABNORMAL /HPF
REF LAB TEST METHOD: ABNORMAL
SMALL PLATELETS BLD QL SMEAR: ADEQUATE
SODIUM BLD-SCNC: 138 MMOL/L (ref 134–146)
SP GR UR STRIP: 1.01 (ref 1–1.03)
SQUAMOUS #/AREA URNS HPF: ABNORMAL /HPF
T4 FREE SERPL-MCNC: 1.44 NG/DL (ref 0.78–2.19)
TRIGL SERPL-MCNC: 193 MG/DL (ref 35–160)
TSH SERPL DL<=0.05 MIU/L-ACNC: 1.02 MIU/ML (ref 0.46–4.68)
UROBILINOGEN UR QL STRIP: ABNORMAL
VLDLC SERPL-MCNC: 38.6 MG/DL
WBC MORPH BLD: NORMAL
WBC NRBC COR # BLD: 10.83 10*3/MM3 (ref 3.2–9.8)
WBC UR QL AUTO: ABNORMAL /HPF

## 2018-03-02 PROCEDURE — 83036 HEMOGLOBIN GLYCOSYLATED A1C: CPT | Performed by: INTERNAL MEDICINE

## 2018-03-02 PROCEDURE — 85025 COMPLETE CBC W/AUTO DIFF WBC: CPT | Performed by: INTERNAL MEDICINE

## 2018-03-02 PROCEDURE — 81001 URINALYSIS AUTO W/SCOPE: CPT | Performed by: INTERNAL MEDICINE

## 2018-03-02 PROCEDURE — 84443 ASSAY THYROID STIM HORMONE: CPT | Performed by: INTERNAL MEDICINE

## 2018-03-02 PROCEDURE — G0103 PSA SCREENING: HCPCS | Performed by: INTERNAL MEDICINE

## 2018-03-02 PROCEDURE — 84439 ASSAY OF FREE THYROXINE: CPT | Performed by: INTERNAL MEDICINE

## 2018-03-02 PROCEDURE — 80053 COMPREHEN METABOLIC PANEL: CPT | Performed by: INTERNAL MEDICINE

## 2018-03-02 PROCEDURE — 80061 LIPID PANEL: CPT | Performed by: INTERNAL MEDICINE

## 2018-03-02 PROCEDURE — 36415 COLL VENOUS BLD VENIPUNCTURE: CPT | Performed by: INTERNAL MEDICINE

## 2018-03-12 ENCOUNTER — OFFICE VISIT (OUTPATIENT)
Dept: FAMILY MEDICINE CLINIC | Facility: CLINIC | Age: 73
End: 2018-03-12

## 2018-03-12 VITALS
SYSTOLIC BLOOD PRESSURE: 150 MMHG | HEIGHT: 68 IN | DIASTOLIC BLOOD PRESSURE: 78 MMHG | HEART RATE: 82 BPM | BODY MASS INDEX: 36.68 KG/M2 | WEIGHT: 242 LBS | TEMPERATURE: 97 F

## 2018-03-12 DIAGNOSIS — N18.1 CHRONIC RENAL IMPAIRMENT, STAGE 1: Chronic | ICD-10-CM

## 2018-03-12 DIAGNOSIS — R73.02 IMPAIRED GLUCOSE TOLERANCE: Primary | Chronic | ICD-10-CM

## 2018-03-12 DIAGNOSIS — E78.2 MIXED HYPERLIPIDEMIA: Chronic | ICD-10-CM

## 2018-03-12 DIAGNOSIS — I10 ESSENTIAL HYPERTENSION: Chronic | ICD-10-CM

## 2018-03-12 DIAGNOSIS — D50.8 IRON DEFICIENCY ANEMIA DUE TO DIETARY CAUSES: Chronic | ICD-10-CM

## 2018-03-12 DIAGNOSIS — Z23 PNEUMOCOCCAL VACCINATION GIVEN: ICD-10-CM

## 2018-03-12 PROCEDURE — G0009 ADMIN PNEUMOCOCCAL VACCINE: HCPCS | Performed by: INTERNAL MEDICINE

## 2018-03-12 PROCEDURE — 99214 OFFICE O/P EST MOD 30 MIN: CPT | Performed by: INTERNAL MEDICINE

## 2018-03-12 PROCEDURE — 90732 PPSV23 VACC 2 YRS+ SUBQ/IM: CPT | Performed by: INTERNAL MEDICINE

## 2018-03-12 RX ORDER — HYDRALAZINE HYDROCHLORIDE 50 MG/1
50 TABLET, FILM COATED ORAL 2 TIMES DAILY
Qty: 180 TABLET | Refills: 1 | Status: SHIPPED | OUTPATIENT
Start: 2018-03-12 | End: 2018-03-19 | Stop reason: DRUGHIGH

## 2018-03-12 RX ORDER — THERMOMETER, ELECTRONIC,ORAL
1 EACH MISCELLANEOUS 2 TIMES DAILY
COMMUNITY

## 2018-03-12 RX ORDER — AMLODIPINE BESYLATE 10 MG/1
10 TABLET ORAL DAILY
Qty: 90 TABLET | Refills: 1 | Status: SHIPPED | OUTPATIENT
Start: 2018-03-12 | End: 2018-05-07 | Stop reason: SINTOL

## 2018-03-12 NOTE — PROGRESS NOTES
Chief Complaint   Patient presents with   • Hyperlipidemia   • Hypertension     Subjective   Noe Barnes Sr. is a 72 y.o. male who presents to the office for follow-up and review of labs. He has hypertension and his blood pressure has been controlled.  He has GERD which is well controlled with Protonix.  He has impaired glucose tolerance and has been monitoring his dietary intake of sugars and carbohydrates.  He has hyperlipidemia and takes pravastatin daily.  He has iron deficient anemia and takes an iron supplement daily.  He has renal insufficiency and follows with nephrology. He recently saw dermatology for his rash.  It was thought that this is secondary to his hydrochlorothiazide.  The HCTZ was discontinued.  I increased his losartan to 100 mg daily.  His Norvasc was also increased to 10 mg daily.  He has been tolerating these without any side effects.  His blood pressure has still been running a little high at times.    The following portions of the patient's history were reviewed and updated as appropriate: allergies, current medications, past family history, past medical history, past social history, past surgical history and problem list.    Review of Systems   Constitutional: Negative for chills, fatigue and fever.   HENT: Negative for congestion, sneezing, sore throat and trouble swallowing.    Eyes: Negative for visual disturbance.   Respiratory: Negative for cough, chest tightness, shortness of breath and wheezing.    Cardiovascular: Negative for chest pain, palpitations and leg swelling.   Gastrointestinal: Negative for abdominal pain, constipation, diarrhea, nausea and vomiting.   Genitourinary: Negative for dysuria, frequency and urgency.   Musculoskeletal: Negative for neck pain.   Neurological: Negative for dizziness, weakness and headaches.   Psychiatric/Behavioral:        Patient denies any feelings of depression and has not felt down, hopeless or lost interest in any activities.   All  "other systems reviewed and are negative.      Objective   Vitals:    03/12/18 1016   BP: 150/78   BP Location: Left arm   Patient Position: Sitting   Cuff Size: Adult   Pulse: 82   Temp: 97 °F (36.1 °C)   TempSrc: Oral   Weight: 110 kg (242 lb)   Height: 172.7 cm (68\")   PainSc:   5   PainLoc: Throat     Physical Exam   Constitutional: He is oriented to person, place, and time. He appears well-developed and well-nourished. No distress.   HENT:   Head: Normocephalic and atraumatic.   Nose: Nose normal.   Mouth/Throat: Oropharynx is clear and moist. No oropharyngeal exudate.   Eyes: Conjunctivae and EOM are normal. Pupils are equal, round, and reactive to light. No scleral icterus.   Neck: Normal range of motion. Neck supple.   Cardiovascular: Normal rate, regular rhythm and normal heart sounds.  Exam reveals no gallop and no friction rub.    No murmur heard.  Pulmonary/Chest: Effort normal and breath sounds normal. No respiratory distress. He has no wheezes. He has no rales.   Abdominal: Soft. Bowel sounds are normal. He exhibits no distension. There is no tenderness. There is no rebound and no guarding.   Musculoskeletal: Normal range of motion. He exhibits no edema.   Lymphadenopathy:     He has no cervical adenopathy.   Neurological: He is alert and oriented to person, place, and time. No cranial nerve deficit.   Skin: Skin is warm and dry. No rash noted.   Psychiatric: He has a normal mood and affect. His behavior is normal. Judgment and thought content normal.   Nursing note and vitals reviewed.      Assessment/Plan   Noe was seen today for hyperlipidemia and hypertension.    Diagnoses and all orders for this visit:    Impaired glucose tolerance  -     Hemoglobin A1c; Future    Essential hypertension  -     Comprehensive Metabolic Panel; Future  -     T4, Free; Future  -     TSH; Future  -     Urinalysis With / Culture If Indicated - Urine, Clean Catch; Future  -     amLODIPine (NORVASC) 10 MG tablet; Take 1 " tablet by mouth Daily.  -     hydrALAZINE (APRESOLINE) 50 MG tablet; Take 1 tablet by mouth 2 (Two) Times a Day.    Mixed hyperlipidemia  -     LDL Cholesterol, Direct; Future    Chronic renal impairment, stage 1    Iron deficiency anemia due to dietary causes  -     CBC & Differential; Future    Pneumococcal vaccination given  -     Pneumococcal Polysaccharide Vaccine 23-Valent Greater Than or Equal To 1yo Subcutaneous / IM         Labs are reviewed with patient.  His glucose is normal, but it has been elevated at previous visits.  Patient will continue to watch diet to help maintain control of the glucose.  Patient understands that there is an increased risk of developing diabetes in the future.  His lipids are at goal, and he will continue with pravastatin for treatment of his hyperlipidemia.  His creatinine has improved at 1.1.  He will continue with Protonix for treatment of GERD.  His blood pressure is elevated.  He will continue with his current blood pressure medication, and I will also add hydralazine 50 mg twice a day.  He will continue to monitor his blood pressure at home, and let me know if it remains elevated.  His anemia is stable, and He will continue to take the daily iron supplement.      PHQ-2/PHQ-9 Depression Screening 3/12/2018   Little interest or pleasure in doing things 0   Feeling down, depressed, or hopeless 0   Trouble falling or staying asleep, or sleeping too much 3   Feeling tired or having little energy 2   Poor appetite or overeating 0   Feeling bad about yourself - or that you are a failure or have let yourself or your family down 0   Trouble concentrating on things, such as reading the newspaper or watching television 0   Moving or speaking so slowly that other people could have noticed. Or the opposite - being so fidgety or restless that you have been moving around a lot more than usual 0   Thoughts that you would be better off dead, or of hurting yourself in some way 0   Total  Score 5   If you checked off any problems, how difficult have these problems made it for you to do your work, take care of things at home, or get along with other people? Somewhat difficult         Lab on 03/02/2018   Component Date Value Ref Range Status   • Glucose 03/02/2018 80  70 - 100 mg/dL Final   • BUN 03/02/2018 23  8 - 25 mg/dL Final   • Creatinine 03/02/2018 1.10  0.40 - 1.30 mg/dL Final   • Sodium 03/02/2018 138  134 - 146 mmol/L Final   • Potassium 03/02/2018 4.3  3.4 - 5.4 mmol/L Final   • Chloride 03/02/2018 97* 100 - 112 mmol/L Final   • CO2 03/02/2018 30.0  20.0 - 32.0 mmol/L Final   • Calcium 03/02/2018 9.2  8.4 - 10.8 mg/dL Final   • Total Protein 03/02/2018 6.8  6.7 - 8.2 g/dL Final   • Albumin 03/02/2018 3.60  3.20 - 5.50 g/dL Final   • ALT (SGPT) 03/02/2018 46  10 - 60 U/L Final   • AST (SGOT) 03/02/2018 27  10 - 60 U/L Final   • Alkaline Phosphatase 03/02/2018 57  15 - 121 U/L Final   • Total Bilirubin 03/02/2018 0.9  0.2 - 1.0 mg/dL Final   • eGFR Non African Amer 03/02/2018 66  42 - 98 mL/min/1.73 Final   • Globulin 03/02/2018 3.2  2.5 - 4.6 gm/dL Final   • A/G Ratio 03/02/2018 1.1  1.0 - 3.0 g/dL Final   • BUN/Creatinine Ratio 03/02/2018 20.9  7.0 - 25.0 Final   • Anion Gap 03/02/2018 11.0  5.0 - 15.0 mmol/L Final   • Hemoglobin A1C 03/02/2018 6.4* 4 - 5.6 % Final   • Total Cholesterol 03/02/2018 159  150 - 200 mg/dL Final   • Triglycerides 03/02/2018 193* 35 - 160 mg/dL Final   • HDL Cholesterol 03/02/2018 68  35 - 100 mg/dL Final   • LDL Cholesterol  03/02/2018 52  mg/dL Final   • VLDL Cholesterol 03/02/2018 38.6  mg/dL Final   • LDL/HDL Ratio 03/02/2018 0.77   Final   • PSA 03/02/2018 0.438  0.000 - 4.000 ng/mL Final   • Free T4 03/02/2018 1.44  0.78 - 2.19 ng/dL Final   • TSH 03/02/2018 1.020  0.460 - 4.680 mIU/mL Final   • Color, UA 03/02/2018 Yellow  Yellow, Straw Final   • Appearance, UA 03/02/2018 Cloudy* Clear Final   • pH, UA 03/02/2018 6.0  5.5 - 8.0 Final   • Specific Gravity,  UA 03/02/2018 1.010  1.005 - 1.030 Final   • Glucose, UA 03/02/2018 Negative  Negative Final   • Ketones, UA 03/02/2018 Negative  Negative Final   • Bilirubin, UA 03/02/2018 Negative  Negative Final   • Blood, UA 03/02/2018 Small (1+)* Negative Final   • Protein, UA 03/02/2018 Negative  Negative Final   • Leuk Esterase, UA 03/02/2018 Negative  Negative Final   • Nitrite, UA 03/02/2018 Negative  Negative Final   • Urobilinogen, UA 03/02/2018 0.2 E.U./dL  0.2 - 1.0 E.U./dL Final   • WBC 03/02/2018 10.83* 3.20 - 9.80 10*3/mm3 Final   • RBC 03/02/2018 4.07* 4.37 - 5.74 10*6/mm3 Final   • Hemoglobin 03/02/2018 13.4* 13.7 - 17.3 g/dL Final   • Hematocrit 03/02/2018 40.2  39.0 - 49.0 % Final   • MCV 03/02/2018 98.8* 80.0 - 98.0 fL Final   • MCH 03/02/2018 32.9  26.5 - 34.0 pg Final   • MCHC 03/02/2018 33.3  31.5 - 36.3 g/dL Final   • RDW 03/02/2018 14.9* 11.5 - 14.5 % Final   • RDW-SD 03/02/2018 51.4* 35.1 - 43.9 fl Final   • MPV 03/02/2018 8.6  8.0 - 12.0 fL Final   • Platelets 03/02/2018 260  150 - 450 10*3/mm3 Final   • RBC, UA 03/02/2018 3-5* None Seen /HPF Final   • WBC, UA 03/02/2018 0-2* None Seen /HPF Final   • Bacteria, UA 03/02/2018 None Seen  None Seen /HPF Final   • Squamous Epithelial Cells, UA 03/02/2018 0-2  None Seen, 0-2 /HPF Final   • Hyaline Casts, UA 03/02/2018 None Seen  None Seen /LPF Final   • Methodology 03/02/2018 Manual Light Microscopy   Final   • Neutrophil % 03/02/2018 55.0  37.0 - 80.0 % Final   • Lymphocyte % 03/02/2018 38.0  10.0 - 50.0 % Final   • Monocyte % 03/02/2018 6.0  0.0 - 12.0 % Final   • Eosinophil % 03/02/2018 1.0  0.0 - 7.0 % Final   • Neutrophils Absolute 03/02/2018 5.96  2.00 - 8.60 10*3/mm3 Final   • Lymphocytes Absolute 03/02/2018 4.12  0.60 - 4.20 10*3/mm3 Final   • Monocytes Absolute 03/02/2018 0.65  0.00 - 0.90 10*3/mm3 Final   • Eosinophils Absolute 03/02/2018 0.11  0.00 - 0.70 10*3/mm3 Final   • Anisocytosis 03/02/2018 Slight/1+  None Seen Final   • WBC Morphology  03/02/2018 Normal  Normal Final   • Platelet Estimate 03/02/2018 Adequate  Normal Final   Admission on 02/18/2018, Discharged on 02/18/2018   Component Date Value Ref Range Status   • Rapid Influenza A Ag 02/18/2018 neg   Final   • Rapid Influenza B Ag 02/18/2018 pos   Final   • Internal Control 02/18/2018 Passed  Passed Final   • Lot Number 02/18/2018 [id number]   Final   • Expiration Date 02/18/2018 3/20   Final   Lab on 02/07/2018   Component Date Value Ref Range Status   • Glucose 02/07/2018 110* 70 - 100 mg/dL Final   • BUN 02/07/2018 25  8 - 25 mg/dL Final   • Creatinine 02/07/2018 1.40* 0.40 - 1.30 mg/dL Final   • Sodium 02/07/2018 137  134 - 146 mmol/L Final   • Potassium 02/07/2018 3.7  3.4 - 5.4 mmol/L Final   • Chloride 02/07/2018 96* 100 - 112 mmol/L Final   • CO2 02/07/2018 30.0  20.0 - 32.0 mmol/L Final   • Calcium 02/07/2018 9.1  8.4 - 10.8 mg/dL Final   • Total Protein 02/07/2018 7.5  6.7 - 8.2 g/dL Final   • Albumin 02/07/2018 3.80  3.20 - 5.50 g/dL Final   • ALT (SGPT) 02/07/2018 34  10 - 60 U/L Final   • AST (SGOT) 02/07/2018 34  10 - 60 U/L Final   • Alkaline Phosphatase 02/07/2018 82  15 - 121 U/L Final   • Total Bilirubin 02/07/2018 1.0  0.2 - 1.0 mg/dL Final   • eGFR Non African Amer 02/07/2018 50  42 - 98 mL/min/1.73 Final   • Globulin 02/07/2018 3.7  2.5 - 4.6 gm/dL Final   • A/G Ratio 02/07/2018 1.0  1.0 - 3.0 g/dL Final   • BUN/Creatinine Ratio 02/07/2018 17.9  7.0 - 25.0 Final   • Anion Gap 02/07/2018 11.0  5.0 - 15.0 mmol/L Final   • Magnesium 02/07/2018 1.4* 1.8 - 2.5 mg/dL Final   • Uric Acid 02/07/2018 6.4  2.6 - 7.2 mg/dL Final   • Protein/Creatinine Ratio, Urine 02/07/2018 61.2  0.0 - 200.0 mg/G Crea Final   • Creatinine, Urine 02/07/2018 192.7  mg/dL Final   • Total Protein, Urine 02/07/2018 11.8  mg/dL Final   • WBC 02/07/2018 9.17  3.20 - 9.80 10*3/mm3 Final   • RBC 02/07/2018 4.16* 4.37 - 5.74 10*6/mm3 Final   • Hemoglobin 02/07/2018 13.6* 13.7 - 17.3 g/dL Final   • Hematocrit  02/07/2018 41.2  39.0 - 49.0 % Final   • MCV 02/07/2018 99.0* 80.0 - 98.0 fL Final   • MCH 02/07/2018 32.7  26.5 - 34.0 pg Final   • MCHC 02/07/2018 33.0  31.5 - 36.3 g/dL Final   • RDW 02/07/2018 14.3  11.5 - 14.5 % Final   • RDW-SD 02/07/2018 50.2* 35.1 - 43.9 fl Final   • MPV 02/07/2018 8.7  8.0 - 12.0 fL Final   • Platelets 02/07/2018 238  150 - 450 10*3/mm3 Final   • Neutrophil % 02/07/2018 56.2  37.0 - 80.0 % Final   • Lymphocyte % 02/07/2018 34.6  10.0 - 50.0 % Final   • Monocyte % 02/07/2018 7.6  0.0 - 12.0 % Final   • Eosinophil % 02/07/2018 1.5  0.0 - 7.0 % Final   • Basophil % 02/07/2018 0.1  0.0 - 2.0 % Final   • Neutrophils, Absolute 02/07/2018 5.15  2.00 - 8.60 10*3/mm3 Final   • Lymphocytes, Absolute 02/07/2018 3.17  0.60 - 4.20 10*3/mm3 Final   • Monocytes, Absolute 02/07/2018 0.70  0.00 - 0.90 10*3/mm3 Final   • Eosinophils, Absolute 02/07/2018 0.14  0.00 - 0.70 10*3/mm3 Final   • Basophils, Absolute 02/07/2018 0.01  0.00 - 0.20 10*3/mm3 Final   ]

## 2018-03-19 ENCOUNTER — TELEPHONE (OUTPATIENT)
Dept: FAMILY MEDICINE CLINIC | Facility: CLINIC | Age: 73
End: 2018-03-19

## 2018-03-19 RX ORDER — HYDRALAZINE HYDROCHLORIDE 100 MG/1
100 TABLET, FILM COATED ORAL 3 TIMES DAILY
Qty: 90 TABLET | Refills: 2 | Status: SHIPPED | OUTPATIENT
Start: 2018-03-19 | End: 2018-05-07

## 2018-03-19 NOTE — TELEPHONE ENCOUNTER
States legs are very swollen and blood pressure elevated.  Blood pressure this am 152/95.  Instructed to increase Hydralazine to 100 mg tid and decrease Amlodipine dose to 5 mg daily.  Verbalized understanding to go to ER if worsening of symptoms or increased SOA. Will report back as needed.

## 2018-03-22 ENCOUNTER — TELEPHONE (OUTPATIENT)
Dept: FAMILY MEDICINE CLINIC | Facility: CLINIC | Age: 73
End: 2018-03-22

## 2018-03-22 NOTE — TELEPHONE ENCOUNTER
States blood pressure was 118/93 this am. Has rash in groin area. Unable to come in for appointment today. Appointment made with Perla FISHER for tomorrow at 1430. Notified.

## 2018-03-23 ENCOUNTER — OFFICE VISIT (OUTPATIENT)
Dept: FAMILY MEDICINE CLINIC | Facility: CLINIC | Age: 73
End: 2018-03-23

## 2018-03-23 VITALS
TEMPERATURE: 98.2 F | BODY MASS INDEX: 36.22 KG/M2 | WEIGHT: 239 LBS | OXYGEN SATURATION: 95 % | HEIGHT: 68 IN | SYSTOLIC BLOOD PRESSURE: 150 MMHG | DIASTOLIC BLOOD PRESSURE: 80 MMHG | HEART RATE: 97 BPM

## 2018-03-23 DIAGNOSIS — I10 ESSENTIAL HYPERTENSION: Primary | ICD-10-CM

## 2018-03-23 DIAGNOSIS — B37.2 YEAST INFECTION OF THE SKIN: ICD-10-CM

## 2018-03-23 PROCEDURE — 99213 OFFICE O/P EST LOW 20 MIN: CPT | Performed by: NURSE PRACTITIONER

## 2018-03-23 RX ORDER — NYSTATIN 100000 U/G
OINTMENT TOPICAL 3 TIMES DAILY
Qty: 30 G | Refills: 5 | Status: SHIPPED | OUTPATIENT
Start: 2018-03-23 | End: 2022-07-15 | Stop reason: SDUPTHER

## 2018-03-23 RX ORDER — TRIAMCINOLONE ACETONIDE 1 MG/G
1 CREAM TOPICAL
COMMUNITY
Start: 2018-03-21 | End: 2018-11-13

## 2018-03-27 ENCOUNTER — OFFICE VISIT (OUTPATIENT)
Dept: FAMILY MEDICINE CLINIC | Facility: CLINIC | Age: 73
End: 2018-03-27

## 2018-03-27 VITALS
SYSTOLIC BLOOD PRESSURE: 152 MMHG | DIASTOLIC BLOOD PRESSURE: 82 MMHG | TEMPERATURE: 98.6 F | OXYGEN SATURATION: 98 % | BODY MASS INDEX: 36.77 KG/M2 | WEIGHT: 242.6 LBS | HEART RATE: 100 BPM | HEIGHT: 68 IN

## 2018-03-27 DIAGNOSIS — I10 ESSENTIAL HYPERTENSION: Primary | ICD-10-CM

## 2018-03-27 DIAGNOSIS — B37.2 YEAST INFECTION OF THE SKIN: ICD-10-CM

## 2018-03-27 PROCEDURE — 99213 OFFICE O/P EST LOW 20 MIN: CPT | Performed by: NURSE PRACTITIONER

## 2018-05-07 ENCOUNTER — OFFICE VISIT (OUTPATIENT)
Dept: FAMILY MEDICINE CLINIC | Facility: CLINIC | Age: 73
End: 2018-05-07

## 2018-05-07 VITALS
DIASTOLIC BLOOD PRESSURE: 82 MMHG | HEIGHT: 68 IN | SYSTOLIC BLOOD PRESSURE: 140 MMHG | BODY MASS INDEX: 37.28 KG/M2 | HEART RATE: 88 BPM | WEIGHT: 246 LBS

## 2018-05-07 DIAGNOSIS — N18.1 CHRONIC RENAL IMPAIRMENT, STAGE 1: Chronic | ICD-10-CM

## 2018-05-07 DIAGNOSIS — I10 ESSENTIAL HYPERTENSION: Chronic | ICD-10-CM

## 2018-05-07 DIAGNOSIS — R60.9 PERIPHERAL EDEMA: Primary | Chronic | ICD-10-CM

## 2018-05-07 PROCEDURE — 99214 OFFICE O/P EST MOD 30 MIN: CPT | Performed by: INTERNAL MEDICINE

## 2018-05-07 RX ORDER — HYDRALAZINE HYDROCHLORIDE 100 MG/1
50 TABLET, FILM COATED ORAL 2 TIMES DAILY
Qty: 90 TABLET | Refills: 2
Start: 2018-05-07 | End: 2018-06-28 | Stop reason: SDUPTHER

## 2018-05-07 RX ORDER — METOPROLOL SUCCINATE 50 MG/1
50 TABLET, EXTENDED RELEASE ORAL 2 TIMES DAILY
Qty: 60 TABLET | Refills: 0
Start: 2018-05-07 | End: 2018-05-15 | Stop reason: SDUPTHER

## 2018-05-07 NOTE — PROGRESS NOTES
Chief Complaint   Patient presents with   • Foot Swelling     x2 months     Subjective   Noe Barnes Sr. is a 72 y.o. male who presents to the office for follow-up. He has hypertension and has recently had several blood pressure medication adjustments.  His blood pressure has still been running high at times.  His heart rate is usually staying in the 90s.  He is continuing to have quite a bit of swelling in the ankles and feet.  When I last saw him, he was taking Norvasc 10 mg daily, Toprol-XL 25 mg daily, and hydralazine 100 mg 3 times a day.  Shortly thereafter, the Norvasc was reduced to 5 mg daily secondary to the peripheral edema.  He then saw someone else in my absence and his hydralazine was reduced to 50 mg at night and the Toprol-XL increased to 25 mg twice a day.    The following portions of the patient's history were reviewed and updated as appropriate: allergies, current medications, past family history, past medical history, past social history, past surgical history and problem list.    Review of Systems   Constitutional: Negative for chills, fatigue and fever.   HENT: Negative for congestion, sneezing, sore throat and trouble swallowing.    Eyes: Negative for visual disturbance.   Respiratory: Negative for cough, chest tightness, shortness of breath and wheezing.    Cardiovascular: Positive for leg swelling. Negative for chest pain and palpitations.   Gastrointestinal: Negative for abdominal pain, constipation, diarrhea, nausea and vomiting.   Genitourinary: Negative for dysuria, frequency and urgency.   Musculoskeletal: Negative for neck pain.   Neurological: Negative for dizziness, weakness and headaches.   Psychiatric/Behavioral:        Patient denies any feelings of depression and has not felt down, hopeless or lost interest in any activities.   All other systems reviewed and are negative.      Objective   Vitals:    05/07/18 0943   BP: 140/82   BP Location: Left arm   Patient Position:  "Sitting   Cuff Size: Large Adult   Pulse: 88   Weight: 112 kg (246 lb)   Height: 172.7 cm (68\")   PainSc: 0-No pain     Physical Exam   Constitutional: He is oriented to person, place, and time. He appears well-developed and well-nourished. No distress.   HENT:   Head: Normocephalic and atraumatic.   Nose: Nose normal.   Mouth/Throat: Oropharynx is clear and moist. No oropharyngeal exudate.   Eyes: Conjunctivae and EOM are normal. Pupils are equal, round, and reactive to light. No scleral icterus.   Neck: Normal range of motion. Neck supple.   Cardiovascular: Normal rate, regular rhythm and normal heart sounds.  Exam reveals no gallop and no friction rub.    No murmur heard.  Pulmonary/Chest: Effort normal and breath sounds normal. No respiratory distress. He has no wheezes. He has no rales.   Abdominal: Soft. Bowel sounds are normal. He exhibits no distension. There is no tenderness. There is no rebound and no guarding.   Musculoskeletal: Normal range of motion. He exhibits no edema.   He has trace edema of the lower extremities   Lymphadenopathy:     He has no cervical adenopathy.   Neurological: He is alert and oriented to person, place, and time. No cranial nerve deficit.   Skin: Skin is warm and dry. No rash noted.   Psychiatric: He has a normal mood and affect. His behavior is normal. Judgment and thought content normal.   Nursing note and vitals reviewed.      Assessment/Plan   Noe was seen today for foot swelling.    Diagnoses and all orders for this visit:    Peripheral edema    Essential hypertension  -     hydrALAZINE (APRESOLINE) 100 MG tablet; Take 0.5 tablets by mouth 2 (Two) Times a Day.  -     metoprolol succinate XL (TOPROL-XL) 50 MG 24 hr tablet; Take 1 tablet by mouth 2 (Two) Times a Day.    Chronic renal impairment, stage 1    I reviewed the blood pressure log which he brought with him today.  His heart rate and blood pressure are both still running a little high at times.  I think the edema is " likely secondary to the Norvasc.  He will discontinue Norvasc.  He will increase the hydralazine back to 50 mg twice a day.  I will also increase the Toprol-XL to 50 mg twice a day.  I will see him back in 2 weeks for follow-up.  He will continue to keep a blood pressure log between now and his next appointment.  At some point, I would like to get the Toprol-XL to a once daily dosing, however I don't want to make too many changes today which would cause issues with confusion and compliance.       PHQ-2/PHQ-9 Depression Screening 5/7/2018   Little interest or pleasure in doing things 2   Feeling down, depressed, or hopeless 2   Trouble falling or staying asleep, or sleeping too much 0   Feeling tired or having little energy 2   Poor appetite or overeating 0   Feeling bad about yourself - or that you are a failure or have let yourself or your family down 0   Trouble concentrating on things, such as reading the newspaper or watching television 0   Moving or speaking so slowly that other people could have noticed. Or the opposite - being so fidgety or restless that you have been moving around a lot more than usual 0   Thoughts that you would be better off dead, or of hurting yourself in some way 0   Total Score 6   If you checked off any problems, how difficult have these problems made it for you to do your work, take care of things at home, or get along with other people? Somewhat difficult

## 2018-05-15 DIAGNOSIS — I10 ESSENTIAL HYPERTENSION: Chronic | ICD-10-CM

## 2018-05-15 RX ORDER — LOSARTAN POTASSIUM 100 MG/1
100 TABLET ORAL DAILY
Qty: 30 TABLET | Refills: 0 | Status: SHIPPED | OUTPATIENT
Start: 2018-05-15 | End: 2018-09-17 | Stop reason: SDUPTHER

## 2018-05-15 RX ORDER — METOPROLOL SUCCINATE 50 MG/1
50 TABLET, EXTENDED RELEASE ORAL 2 TIMES DAILY
Qty: 60 TABLET | Refills: 0 | Status: SHIPPED | OUTPATIENT
Start: 2018-05-15 | End: 2018-07-05 | Stop reason: SDUPTHER

## 2018-05-25 ENCOUNTER — OFFICE VISIT (OUTPATIENT)
Dept: FAMILY MEDICINE CLINIC | Facility: CLINIC | Age: 73
End: 2018-05-25

## 2018-05-25 VITALS
WEIGHT: 246 LBS | DIASTOLIC BLOOD PRESSURE: 74 MMHG | SYSTOLIC BLOOD PRESSURE: 136 MMHG | HEART RATE: 92 BPM | BODY MASS INDEX: 37.28 KG/M2 | HEIGHT: 68 IN

## 2018-05-25 DIAGNOSIS — I10 ESSENTIAL HYPERTENSION: Primary | Chronic | ICD-10-CM

## 2018-05-25 DIAGNOSIS — N18.1 CHRONIC RENAL IMPAIRMENT, STAGE 1: Chronic | ICD-10-CM

## 2018-05-25 DIAGNOSIS — R60.9 PERIPHERAL EDEMA: ICD-10-CM

## 2018-05-25 PROCEDURE — 99213 OFFICE O/P EST LOW 20 MIN: CPT | Performed by: INTERNAL MEDICINE

## 2018-05-25 RX ORDER — FUROSEMIDE 20 MG/1
10 TABLET ORAL DAILY
COMMUNITY
End: 2018-09-17 | Stop reason: SDUPTHER

## 2018-05-25 NOTE — PROGRESS NOTES
"Chief Complaint   Patient presents with   • Follow-up   • Leg Swelling   • Rash     Subjective   Noe Celestine Barnes Sr. is a 72 y.o. male who presents to the office for follow-up. He has hypertension and has recently had several blood pressure medication adjustments.  His blood pressure has been doing better. He is continuing to have quite a bit of swelling in the ankles and feet. He recently saw his cardiologist.  He was started on a low-dose of Lasix 10 mg daily.  We have been trying to hold off on Lasix due to his chronic renal impairment.  Since taking the 10 mg dose of Lasix, he has not noticed any significant improvement in the peripheral edema.    The following portions of the patient's history were reviewed and updated as appropriate: allergies, current medications, past family history, past medical history, past social history, past surgical history and problem list.    Review of Systems   Constitutional: Negative for chills, fatigue and fever.   HENT: Negative for congestion, sneezing, sore throat and trouble swallowing.    Eyes: Negative for visual disturbance.   Respiratory: Negative for cough, chest tightness, shortness of breath and wheezing.    Cardiovascular: Positive for leg swelling. Negative for chest pain and palpitations.   Gastrointestinal: Negative for abdominal pain, constipation, diarrhea, nausea and vomiting.   Genitourinary: Negative for dysuria, frequency and urgency.   Musculoskeletal: Negative for neck pain.   Neurological: Negative for dizziness, weakness and headaches.   Psychiatric/Behavioral:        Patient denies any feelings of depression and has not felt down, hopeless or lost interest in any activities.   All other systems reviewed and are negative.      Objective   Vitals:    05/25/18 1511   BP: 136/74   BP Location: Left arm   Patient Position: Sitting   Cuff Size: Adult   Pulse: 92   Weight: 112 kg (246 lb)   Height: 172.7 cm (68\")   PainSc: 0-No pain     Physical Exam "   Constitutional: He is oriented to person, place, and time. He appears well-developed and well-nourished. No distress.   HENT:   Head: Normocephalic and atraumatic.   Nose: Nose normal.   Mouth/Throat: Oropharynx is clear and moist. No oropharyngeal exudate.   Eyes: Conjunctivae and EOM are normal. Pupils are equal, round, and reactive to light. No scleral icterus.   Neck: Normal range of motion. Neck supple.   Cardiovascular: Normal rate, regular rhythm and normal heart sounds.  Exam reveals no gallop and no friction rub.    No murmur heard.  Pulmonary/Chest: Effort normal and breath sounds normal. No respiratory distress. He has no wheezes. He has no rales.   Abdominal: Soft. Bowel sounds are normal. He exhibits no distension. There is no tenderness. There is no rebound and no guarding.   Musculoskeletal: Normal range of motion. He exhibits no edema.   He has trace edema of the lower extremities   Lymphadenopathy:     He has no cervical adenopathy.   Neurological: He is alert and oriented to person, place, and time. No cranial nerve deficit.   Skin: Skin is warm and dry. No rash noted.   Psychiatric: He has a normal mood and affect. His behavior is normal. Judgment and thought content normal.   Nursing note and vitals reviewed.      Assessment/Plan   Noe was seen today for follow-up, leg swelling and rash.    Diagnoses and all orders for this visit:    Essential hypertension    Peripheral edema    Chronic renal impairment, stage 1          His blood pressure is controlled and is doing a lot better.  He will continue with Lasix, and I will increase this to 20 mg every other day alternating with 10 mg every other day.  I will follow-up with him in a couple of weeks for reevaluation of the edema and hypertension.    PHQ-2/PHQ-9 Depression Screening 5/25/2018   Little interest or pleasure in doing things 0   Feeling down, depressed, or hopeless 0   Trouble falling or staying asleep, or sleeping too much -   Feeling  tired or having little energy -   Poor appetite or overeating -   Feeling bad about yourself - or that you are a failure or have let yourself or your family down -   Trouble concentrating on things, such as reading the newspaper or watching television -   Moving or speaking so slowly that other people could have noticed. Or the opposite - being so fidgety or restless that you have been moving around a lot more than usual -   Thoughts that you would be better off dead, or of hurting yourself in some way -   Total Score 0   If you checked off any problems, how difficult have these problems made it for you to do your work, take care of things at home, or get along with other people? -

## 2018-05-29 ENCOUNTER — TRANSCRIBE ORDERS (OUTPATIENT)
Dept: LAB | Facility: OTHER | Age: 73
End: 2018-05-29

## 2018-05-29 ENCOUNTER — LAB (OUTPATIENT)
Dept: LAB | Facility: OTHER | Age: 73
End: 2018-05-29

## 2018-05-29 DIAGNOSIS — Z51.81 ENCOUNTER FOR MEDICATION MONITORING: ICD-10-CM

## 2018-05-29 DIAGNOSIS — E87.70 HYPERVOLEMIA, UNSPECIFIED HYPERVOLEMIA TYPE: Primary | ICD-10-CM

## 2018-05-29 DIAGNOSIS — E87.70 HYPERVOLEMIA, UNSPECIFIED HYPERVOLEMIA TYPE: ICD-10-CM

## 2018-05-29 LAB
ANION GAP SERPL CALCULATED.3IONS-SCNC: 11 MMOL/L (ref 5–15)
BUN BLD-MCNC: 22 MG/DL (ref 8–25)
BUN/CREAT SERPL: 16.9 (ref 7–25)
CALCIUM SPEC-SCNC: 8.8 MG/DL (ref 8.4–10.8)
CHLORIDE SERPL-SCNC: 101 MMOL/L (ref 100–112)
CO2 SERPL-SCNC: 31 MMOL/L (ref 20–32)
CREAT BLD-MCNC: 1.3 MG/DL (ref 0.4–1.3)
GFR SERPL CREATININE-BSD FRML MDRD: 55 ML/MIN/1.73 (ref 42–98)
GLUCOSE BLD-MCNC: 108 MG/DL (ref 70–100)
POTASSIUM BLD-SCNC: 4.2 MMOL/L (ref 3.4–5.4)
SODIUM BLD-SCNC: 143 MMOL/L (ref 134–146)

## 2018-05-29 PROCEDURE — 80048 BASIC METABOLIC PNL TOTAL CA: CPT | Performed by: INTERNAL MEDICINE

## 2018-05-29 PROCEDURE — 36415 COLL VENOUS BLD VENIPUNCTURE: CPT | Performed by: INTERNAL MEDICINE

## 2018-06-08 ENCOUNTER — OFFICE VISIT (OUTPATIENT)
Dept: FAMILY MEDICINE CLINIC | Facility: CLINIC | Age: 73
End: 2018-06-08

## 2018-06-08 VITALS
DIASTOLIC BLOOD PRESSURE: 80 MMHG | HEART RATE: 68 BPM | SYSTOLIC BLOOD PRESSURE: 140 MMHG | WEIGHT: 243 LBS | BODY MASS INDEX: 36.83 KG/M2 | HEIGHT: 68 IN

## 2018-06-08 DIAGNOSIS — N18.1 CHRONIC RENAL IMPAIRMENT, STAGE 1: Chronic | ICD-10-CM

## 2018-06-08 DIAGNOSIS — I10 ESSENTIAL HYPERTENSION: Primary | Chronic | ICD-10-CM

## 2018-06-08 DIAGNOSIS — R60.9 PERIPHERAL EDEMA: ICD-10-CM

## 2018-06-08 PROCEDURE — 99213 OFFICE O/P EST LOW 20 MIN: CPT | Performed by: INTERNAL MEDICINE

## 2018-06-08 NOTE — PROGRESS NOTES
"Chief Complaint   Patient presents with   • Hypertension   • Edema     Subjective   Noe Barnes Sr. is a 72 y.o. male who presents to the office for follow-up. He has hypertension and has recently had several blood pressure medication adjustments.  His blood pressure has been doing better. He is taking Lasix for treatment of peripheral edema.  He is currently taking 20 mg alternating with 10 mg daily.  This has helped with his edema, but has not caused it to completely resolve.  He has a follow-up with his cardiologist next week.    The following portions of the patient's history were reviewed and updated as appropriate: allergies, current medications, past family history, past medical history, past social history, past surgical history and problem list.    Review of Systems   Constitutional: Negative for chills, fatigue and fever.   HENT: Negative for congestion, sneezing, sore throat and trouble swallowing.    Eyes: Negative for visual disturbance.   Respiratory: Negative for cough, chest tightness, shortness of breath and wheezing.    Cardiovascular: Positive for leg swelling. Negative for chest pain and palpitations.   Gastrointestinal: Negative for abdominal pain, constipation, diarrhea, nausea and vomiting.   Genitourinary: Negative for dysuria, frequency and urgency.   Musculoskeletal: Negative for neck pain.   Neurological: Negative for dizziness, weakness and headaches.   Psychiatric/Behavioral:        Patient denies any feelings of depression and has not felt down, hopeless or lost interest in any activities.   All other systems reviewed and are negative.      Objective   Vitals:    06/08/18 1526   BP: 140/80   BP Location: Left arm   Patient Position: Sitting   Cuff Size: Adult   Pulse: 68   Weight: 110 kg (243 lb)   Height: 172.7 cm (68\")   PainSc:   3   PainLoc: Back  Comment: Left flank     Physical Exam   Constitutional: He is oriented to person, place, and time. He appears well-developed and " well-nourished. No distress.   HENT:   Head: Normocephalic and atraumatic.   Nose: Nose normal.   Mouth/Throat: Oropharynx is clear and moist. No oropharyngeal exudate.   Eyes: Conjunctivae and EOM are normal. Pupils are equal, round, and reactive to light. No scleral icterus.   Neck: Normal range of motion. Neck supple.   Cardiovascular: Normal rate, regular rhythm and normal heart sounds.  Exam reveals no gallop and no friction rub.    No murmur heard.  Pulmonary/Chest: Effort normal and breath sounds normal. No respiratory distress. He has no wheezes. He has no rales.   Abdominal: Soft. Bowel sounds are normal. He exhibits no distension. There is no tenderness. There is no rebound and no guarding.   Musculoskeletal: Normal range of motion. He exhibits no edema.   He has trace edema of the lower extremities   Lymphadenopathy:     He has no cervical adenopathy.   Neurological: He is alert and oriented to person, place, and time. No cranial nerve deficit.   Skin: Skin is warm and dry. No rash noted.   Psychiatric: He has a normal mood and affect. His behavior is normal. Judgment and thought content normal.   Nursing note and vitals reviewed.      Assessment/Plan   Noe was seen today for hypertension and edema.    Diagnoses and all orders for this visit:    Essential hypertension    Peripheral edema    Chronic renal impairment, stage 1          His blood pressure is controlled and is doing a lot better.  He will continue with Lasix, and I will increase this to 20 mg on days 1 and 2 with 10 mg on day 3, and then repeat.  I will follow-up with him after he has been seen and evaluated by cardiology.      PHQ-2/PHQ-9 Depression Screening 6/8/2018   Little interest or pleasure in doing things 0   Feeling down, depressed, or hopeless 0   Trouble falling or staying asleep, or sleeping too much -   Feeling tired or having little energy -   Poor appetite or overeating -   Feeling bad about yourself - or that you are a  failure or have let yourself or your family down -   Trouble concentrating on things, such as reading the newspaper or watching television -   Moving or speaking so slowly that other people could have noticed. Or the opposite - being so fidgety or restless that you have been moving around a lot more than usual -   Thoughts that you would be better off dead, or of hurting yourself in some way -   Total Score 0   If you checked off any problems, how difficult have these problems made it for you to do your work, take care of things at home, or get along with other people? -     .

## 2018-06-15 DIAGNOSIS — I10 ESSENTIAL HYPERTENSION: Chronic | ICD-10-CM

## 2018-06-15 RX ORDER — AMLODIPINE BESYLATE 10 MG/1
10 TABLET ORAL DAILY
Qty: 90 TABLET | Refills: 1 | OUTPATIENT
Start: 2018-06-15

## 2018-06-15 NOTE — TELEPHONE ENCOUNTER
E-script shows it was D/C by Dr. Herron on 05/07/2018? Should patient still be taking the Norvasc 10mg daily? I don't see mention in last office appt other than pt has had several blood pressure med changes. TU

## 2018-06-28 DIAGNOSIS — I10 ESSENTIAL HYPERTENSION: Chronic | ICD-10-CM

## 2018-06-28 RX ORDER — HYDRALAZINE HYDROCHLORIDE 50 MG/1
TABLET, FILM COATED ORAL
Qty: 180 TABLET | Refills: 1 | Status: SHIPPED | OUTPATIENT
Start: 2018-06-28 | End: 2018-12-26 | Stop reason: SDUPTHER

## 2018-07-05 ENCOUNTER — TELEPHONE (OUTPATIENT)
Dept: FAMILY MEDICINE CLINIC | Facility: CLINIC | Age: 73
End: 2018-07-05

## 2018-07-05 DIAGNOSIS — I10 ESSENTIAL HYPERTENSION: Chronic | ICD-10-CM

## 2018-07-05 RX ORDER — METOPROLOL SUCCINATE 50 MG/1
TABLET, EXTENDED RELEASE ORAL
Qty: 60 TABLET | Refills: 2 | Status: SHIPPED | OUTPATIENT
Start: 2018-07-05 | End: 2018-07-05 | Stop reason: DRUGHIGH

## 2018-07-05 RX ORDER — SPIRONOLACTONE 25 MG/1
25 TABLET ORAL DAILY
COMMUNITY
Start: 2018-06-22 | End: 2019-06-18

## 2018-07-05 RX ORDER — METOPROLOL SUCCINATE 100 MG/1
100 TABLET, EXTENDED RELEASE ORAL 2 TIMES DAILY
COMMUNITY
Start: 2018-06-22 | End: 2018-11-13

## 2018-07-05 NOTE — TELEPHONE ENCOUNTER
"Received a call from [email or web address], stated \"Dr. Sandy/Cardiologist has changed the Metoprolol ER to 100mg twice daily. Please diseregard the refill request earlier for the Metoprolol ER 50mg twice daily.\"  Will contact patient and Dr. Sandy to verify dose changes.  "

## 2018-07-17 PROBLEM — B37.2 YEAST INFECTION OF THE SKIN: Status: ACTIVE | Noted: 2018-07-17

## 2018-07-17 NOTE — PROGRESS NOTES
"Subjective   Noe Barnes Sr. is a 72 y.o. male who presents to the office complaining of rash the past two days after he started taking Hydralazine.  Describes as stinging but does not itch.  Has used Clobetasol but it \"sets him on fire.\"  Feels tired.  Has been seen by a dermatology provider who told him to stop his HCTZ.  History of Present Illness     The following portions of the patient's history were reviewed and updated as appropriate: allergies, current medications, past family history, past medical history, past social history, past surgical history and problem list.    Review of Systems   Constitutional: Positive for fatigue. Negative for chills and fever.   HENT: Negative for congestion, sneezing, sore throat and trouble swallowing.    Eyes: Negative for visual disturbance.   Respiratory: Negative for cough, chest tightness, shortness of breath and wheezing.    Cardiovascular: Negative for chest pain, palpitations and leg swelling.   Gastrointestinal: Negative for abdominal pain, constipation, diarrhea, nausea and vomiting.   Genitourinary: Negative for dysuria, frequency and urgency.   Musculoskeletal: Negative for neck pain.   Skin: Positive for rash.   Neurological: Negative for dizziness, weakness and headaches.   Psychiatric/Behavioral:        In the past two weeks the pt has not felt down, depressed, hopeless or lost interest in doing things   All other systems reviewed and are negative.      Objective   Physical Exam   Constitutional: He is oriented to person, place, and time. He appears well-developed and well-nourished. He is cooperative.  Non-toxic appearance. He does not have a sickly appearance. He appears ill (chronically).   HENT:   Head: Normocephalic and atraumatic.   Right Ear: External ear normal.   Left Ear: External ear normal.   Nose: Nose normal.   Mouth/Throat: Uvula is midline, oropharynx is clear and moist and mucous membranes are normal.   Eyes: Conjunctivae and EOM are " normal. Pupils are equal, round, and reactive to light. Right eye exhibits no discharge. Left eye exhibits no discharge. No scleral icterus.   Neck: Normal range of motion. Neck supple. No thyromegaly present.   Cardiovascular: Normal rate, regular rhythm, normal heart sounds and intact distal pulses.  Exam reveals no gallop and no friction rub.    No murmur heard.  Pulmonary/Chest: Effort normal and breath sounds normal. No respiratory distress. He has no wheezes. He has no rales.   Abdominal: Soft. Normal appearance and bowel sounds are normal. He exhibits no distension. There is no tenderness. There is no rebound and no guarding.   Musculoskeletal: Normal range of motion. He exhibits no edema.   Lymphadenopathy:     He has no cervical adenopathy.   Neurological: He is alert and oriented to person, place, and time. No cranial nerve deficit. GCS eye subscore is 4. GCS verbal subscore is 5. GCS motor subscore is 6.   Skin: Skin is warm, dry and intact. Capillary refill takes less than 2 seconds. Rash (candidal rash in groin and down thigh) noted.        Psychiatric: He has a normal mood and affect. His speech is normal and behavior is normal. Judgment and thought content normal. Cognition and memory are normal.   Nursing note and vitals reviewed.      Assessment/Plan   Noe was seen today for rash and hypertension.    Diagnoses and all orders for this visit:    Essential hypertension  -     Ambulatory Referral to Cardiology    Yeast infection of the skin    Other orders  -     nystatin (MYCOSTATIN) [id number] UNIT/GM ointment; Apply  topically 3 (Three) Times a Day.    Encouraged to use anti-bacterial soap when showering and to pat skin dry.  Leave open to air if possible.

## 2018-08-15 ENCOUNTER — LAB (OUTPATIENT)
Dept: LAB | Facility: OTHER | Age: 73
End: 2018-08-15

## 2018-08-15 ENCOUNTER — TRANSCRIBE ORDERS (OUTPATIENT)
Dept: GENERAL RADIOLOGY | Facility: CLINIC | Age: 73
End: 2018-08-15

## 2018-08-15 DIAGNOSIS — E83.42 HYPOMAGNESEMIA: ICD-10-CM

## 2018-08-15 DIAGNOSIS — R31.9 URINARY TRACT INFECTION WITH HEMATURIA, SITE UNSPECIFIED: ICD-10-CM

## 2018-08-15 DIAGNOSIS — E78.5 HYPERLIPIDEMIA, UNSPECIFIED HYPERLIPIDEMIA TYPE: ICD-10-CM

## 2018-08-15 DIAGNOSIS — D64.9 ANEMIA, UNSPECIFIED TYPE: ICD-10-CM

## 2018-08-15 DIAGNOSIS — I12.9 HYPERTENSIVE NEPHROSCLEROSIS, STAGE 1-4 OR UNSPECIFIED CHRONIC KIDNEY DISEASE: ICD-10-CM

## 2018-08-15 DIAGNOSIS — I13.10 BENIGN HYPERTENSIVE HEART AND RENAL DISEASE: ICD-10-CM

## 2018-08-15 DIAGNOSIS — E79.0 URICACIDEMIA: ICD-10-CM

## 2018-08-15 DIAGNOSIS — N28.1 COMPLEX RENAL CYST: ICD-10-CM

## 2018-08-15 DIAGNOSIS — I10 ESSENTIAL HYPERTENSION, MALIGNANT: ICD-10-CM

## 2018-08-15 DIAGNOSIS — N18.30 CHRONIC KIDNEY DISEASE, STAGE III (MODERATE) (HCC): ICD-10-CM

## 2018-08-15 DIAGNOSIS — R68.83 CHILLS WITHOUT FEVER: Primary | ICD-10-CM

## 2018-08-15 DIAGNOSIS — R68.83 CHILLS WITHOUT FEVER: ICD-10-CM

## 2018-08-15 DIAGNOSIS — N39.0 URINARY TRACT INFECTION WITH HEMATURIA, SITE UNSPECIFIED: ICD-10-CM

## 2018-08-15 DIAGNOSIS — N18.30 CHRONIC KIDNEY DISEASE, STAGE III (MODERATE) (HCC): Primary | ICD-10-CM

## 2018-08-15 LAB
ALBUMIN SERPL-MCNC: 4 G/DL (ref 3.5–5)
ALBUMIN/GLOB SERPL: 1.1 G/DL (ref 1.1–1.8)
ALP SERPL-CCNC: 92 U/L (ref 38–126)
ALT SERPL W P-5'-P-CCNC: 46 U/L
ANION GAP SERPL CALCULATED.3IONS-SCNC: 9 MMOL/L (ref 5–15)
AST SERPL-CCNC: 58 U/L (ref 17–59)
BASOPHILS # BLD AUTO: 0.02 10*3/MM3 (ref 0–0.2)
BASOPHILS NFR BLD AUTO: 0.3 % (ref 0–2)
BILIRUB SERPL-MCNC: 0.6 MG/DL (ref 0.2–1.3)
BUN BLD-MCNC: 32 MG/DL (ref 9–20)
BUN/CREAT SERPL: 20.1 (ref 7–25)
CALCIUM SPEC-SCNC: 9.6 MG/DL (ref 8.4–10.2)
CHLORIDE SERPL-SCNC: 102 MMOL/L (ref 98–107)
CO2 SERPL-SCNC: 31 MMOL/L (ref 22–30)
CREAT BLD-MCNC: 1.59 MG/DL (ref 0.66–1.25)
CREAT UR-MCNC: 152.9 MG/DL
DEPRECATED RDW RBC AUTO: 54.4 FL (ref 35.1–43.9)
EOSINOPHIL # BLD AUTO: 0.24 10*3/MM3 (ref 0–0.7)
EOSINOPHIL NFR BLD AUTO: 3.1 % (ref 0–7)
ERYTHROCYTE [DISTWIDTH] IN BLOOD BY AUTOMATED COUNT: 14.7 % (ref 11.5–14.5)
GFR SERPL CREATININE-BSD FRML MDRD: 43 ML/MIN/1.73 (ref 42–98)
GLOBULIN UR ELPH-MCNC: 3.5 GM/DL (ref 2.3–3.5)
GLUCOSE BLD-MCNC: 118 MG/DL (ref 74–99)
HCT VFR BLD AUTO: 41.4 % (ref 39–49)
HGB BLD-MCNC: 12.9 G/DL (ref 13.7–17.3)
LYMPHOCYTES # BLD AUTO: 3.46 10*3/MM3 (ref 0.6–4.2)
LYMPHOCYTES NFR BLD AUTO: 44 % (ref 10–50)
MCH RBC QN AUTO: 32 PG (ref 26.5–34)
MCHC RBC AUTO-ENTMCNC: 31.2 G/DL (ref 31.5–36.3)
MCV RBC AUTO: 102.7 FL (ref 80–98)
MONOCYTES # BLD AUTO: 0.66 10*3/MM3 (ref 0–0.9)
MONOCYTES NFR BLD AUTO: 8.4 % (ref 0–12)
NEUTROPHILS # BLD AUTO: 3.48 10*3/MM3 (ref 2–8.6)
NEUTROPHILS NFR BLD AUTO: 44.2 % (ref 37–80)
PLATELET # BLD AUTO: 224 10*3/MM3 (ref 150–450)
PMV BLD AUTO: 9.1 FL (ref 8–12)
POTASSIUM BLD-SCNC: 4.5 MMOL/L (ref 3.4–5)
PROT SERPL-MCNC: 7.5 G/DL (ref 6.3–8.2)
PROT UR-MCNC: 9.1 MG/DL
PROT/CREAT UR: 59.5 MG/G CREA (ref 0–200)
RBC # BLD AUTO: 4.03 10*6/MM3 (ref 4.37–5.74)
SODIUM BLD-SCNC: 142 MMOL/L (ref 137–145)
WBC NRBC COR # BLD: 7.86 10*3/MM3 (ref 3.2–9.8)

## 2018-08-15 PROCEDURE — 80053 COMPREHEN METABOLIC PANEL: CPT | Performed by: INTERNAL MEDICINE

## 2018-08-15 PROCEDURE — 84156 ASSAY OF PROTEIN URINE: CPT | Performed by: INTERNAL MEDICINE

## 2018-08-15 PROCEDURE — 85025 COMPLETE CBC W/AUTO DIFF WBC: CPT | Performed by: INTERNAL MEDICINE

## 2018-08-15 PROCEDURE — 36415 COLL VENOUS BLD VENIPUNCTURE: CPT | Performed by: INTERNAL MEDICINE

## 2018-08-15 PROCEDURE — 82570 ASSAY OF URINE CREATININE: CPT | Performed by: INTERNAL MEDICINE

## 2018-08-22 DIAGNOSIS — I10 ESSENTIAL HYPERTENSION: Chronic | ICD-10-CM

## 2018-08-23 RX ORDER — METOPROLOL SUCCINATE 50 MG/1
TABLET, EXTENDED RELEASE ORAL
Qty: 60 TABLET | Refills: 2 | OUTPATIENT
Start: 2018-08-23

## 2018-09-14 ENCOUNTER — LAB (OUTPATIENT)
Dept: LAB | Facility: OTHER | Age: 73
End: 2018-09-14

## 2018-09-14 DIAGNOSIS — D50.8 IRON DEFICIENCY ANEMIA DUE TO DIETARY CAUSES: ICD-10-CM

## 2018-09-14 DIAGNOSIS — R73.02 IMPAIRED GLUCOSE TOLERANCE: Chronic | ICD-10-CM

## 2018-09-14 DIAGNOSIS — I10 ESSENTIAL HYPERTENSION: ICD-10-CM

## 2018-09-14 DIAGNOSIS — E78.2 MIXED HYPERLIPIDEMIA: Chronic | ICD-10-CM

## 2018-09-14 LAB
ALBUMIN SERPL-MCNC: 3.8 G/DL (ref 3.5–5)
ALBUMIN/GLOB SERPL: 1.1 G/DL (ref 1.1–1.8)
ALP SERPL-CCNC: 80 U/L (ref 38–126)
ALT SERPL W P-5'-P-CCNC: 39 U/L
ANION GAP SERPL CALCULATED.3IONS-SCNC: 6 MMOL/L (ref 5–15)
ARTICHOKE IGE QN: 64 MG/DL (ref 1–129)
AST SERPL-CCNC: 47 U/L (ref 17–59)
BACTERIA UR QL AUTO: ABNORMAL /HPF
BASOPHILS # BLD AUTO: 0.01 10*3/MM3 (ref 0–0.2)
BASOPHILS NFR BLD AUTO: 0.1 % (ref 0–2)
BILIRUB SERPL-MCNC: 0.7 MG/DL (ref 0.2–1.3)
BILIRUB UR QL STRIP: NEGATIVE
BUN BLD-MCNC: 21 MG/DL (ref 9–20)
BUN/CREAT SERPL: 16.4 (ref 7–25)
CALCIUM SPEC-SCNC: 9.6 MG/DL (ref 8.4–10.2)
CHLORIDE SERPL-SCNC: 104 MMOL/L (ref 98–107)
CLARITY UR: CLEAR
CO2 SERPL-SCNC: 32 MMOL/L (ref 22–30)
COLOR UR: YELLOW
CREAT BLD-MCNC: 1.28 MG/DL (ref 0.66–1.25)
DEPRECATED RDW RBC AUTO: 57.1 FL (ref 35.1–43.9)
EOSINOPHIL # BLD AUTO: 0.28 10*3/MM3 (ref 0–0.7)
EOSINOPHIL NFR BLD AUTO: 3.6 % (ref 0–7)
ERYTHROCYTE [DISTWIDTH] IN BLOOD BY AUTOMATED COUNT: 15.4 % (ref 11.5–14.5)
GFR SERPL CREATININE-BSD FRML MDRD: 56 ML/MIN/1.73 (ref 42–98)
GLOBULIN UR ELPH-MCNC: 3.4 GM/DL (ref 2.3–3.5)
GLUCOSE BLD-MCNC: 111 MG/DL (ref 74–99)
GLUCOSE UR STRIP-MCNC: NEGATIVE MG/DL
HBA1C MFR BLD: 6.5 % (ref 4–5.6)
HCT VFR BLD AUTO: 39.1 % (ref 39–49)
HGB BLD-MCNC: 12.2 G/DL (ref 13.7–17.3)
HGB UR QL STRIP.AUTO: NEGATIVE
HYALINE CASTS UR QL AUTO: ABNORMAL /LPF
KETONES UR QL STRIP: NEGATIVE
LEUKOCYTE ESTERASE UR QL STRIP.AUTO: ABNORMAL
LYMPHOCYTES # BLD AUTO: 3.27 10*3/MM3 (ref 0.6–4.2)
LYMPHOCYTES NFR BLD AUTO: 42 % (ref 10–50)
MCH RBC QN AUTO: 32.6 PG (ref 26.5–34)
MCHC RBC AUTO-ENTMCNC: 31.2 G/DL (ref 31.5–36.3)
MCV RBC AUTO: 104.5 FL (ref 80–98)
MONOCYTES # BLD AUTO: 0.67 10*3/MM3 (ref 0–0.9)
MONOCYTES NFR BLD AUTO: 8.6 % (ref 0–12)
NEUTROPHILS # BLD AUTO: 3.56 10*3/MM3 (ref 2–8.6)
NEUTROPHILS NFR BLD AUTO: 45.7 % (ref 37–80)
NITRITE UR QL STRIP: NEGATIVE
PH UR STRIP.AUTO: 5.5 [PH] (ref 5.5–8)
PLATELET # BLD AUTO: 190 10*3/MM3 (ref 150–450)
PMV BLD AUTO: 8.9 FL (ref 8–12)
POTASSIUM BLD-SCNC: 4.5 MMOL/L (ref 3.4–5)
PROT SERPL-MCNC: 7.2 G/DL (ref 6.3–8.2)
PROT UR QL STRIP: NEGATIVE
RBC # BLD AUTO: 3.74 10*6/MM3 (ref 4.37–5.74)
RBC # UR: ABNORMAL /HPF
REF LAB TEST METHOD: ABNORMAL
SODIUM BLD-SCNC: 142 MMOL/L (ref 137–145)
SP GR UR STRIP: 1.02 (ref 1–1.03)
SQUAMOUS #/AREA URNS HPF: ABNORMAL /HPF
T4 FREE SERPL-MCNC: 1.19 NG/DL (ref 0.78–2.19)
TSH SERPL DL<=0.05 MIU/L-ACNC: 1.72 MIU/ML (ref 0.46–4.68)
UROBILINOGEN UR QL STRIP: ABNORMAL
WBC NRBC COR # BLD: 7.79 10*3/MM3 (ref 3.2–9.8)
WBC UR QL AUTO: ABNORMAL /HPF

## 2018-09-14 PROCEDURE — 85025 COMPLETE CBC W/AUTO DIFF WBC: CPT | Performed by: INTERNAL MEDICINE

## 2018-09-14 PROCEDURE — 80053 COMPREHEN METABOLIC PANEL: CPT | Performed by: INTERNAL MEDICINE

## 2018-09-14 PROCEDURE — 87147 CULTURE TYPE IMMUNOLOGIC: CPT | Performed by: INTERNAL MEDICINE

## 2018-09-14 PROCEDURE — 81001 URINALYSIS AUTO W/SCOPE: CPT | Performed by: INTERNAL MEDICINE

## 2018-09-14 PROCEDURE — 84439 ASSAY OF FREE THYROXINE: CPT | Performed by: INTERNAL MEDICINE

## 2018-09-14 PROCEDURE — 83036 HEMOGLOBIN GLYCOSYLATED A1C: CPT | Performed by: INTERNAL MEDICINE

## 2018-09-14 PROCEDURE — 83721 ASSAY OF BLOOD LIPOPROTEIN: CPT | Performed by: INTERNAL MEDICINE

## 2018-09-14 PROCEDURE — 36415 COLL VENOUS BLD VENIPUNCTURE: CPT | Performed by: INTERNAL MEDICINE

## 2018-09-14 PROCEDURE — 84443 ASSAY THYROID STIM HORMONE: CPT | Performed by: INTERNAL MEDICINE

## 2018-09-14 PROCEDURE — 87086 URINE CULTURE/COLONY COUNT: CPT | Performed by: INTERNAL MEDICINE

## 2018-09-16 LAB — BACTERIA SPEC AEROBE CULT: ABNORMAL

## 2018-09-17 ENCOUNTER — OFFICE VISIT (OUTPATIENT)
Dept: FAMILY MEDICINE CLINIC | Facility: CLINIC | Age: 73
End: 2018-09-17

## 2018-09-17 VITALS
WEIGHT: 247 LBS | BODY MASS INDEX: 37.44 KG/M2 | SYSTOLIC BLOOD PRESSURE: 138 MMHG | OXYGEN SATURATION: 97 % | TEMPERATURE: 97.4 F | HEART RATE: 70 BPM | DIASTOLIC BLOOD PRESSURE: 78 MMHG | HEIGHT: 68 IN

## 2018-09-17 DIAGNOSIS — R60.9 PERIPHERAL EDEMA: Chronic | ICD-10-CM

## 2018-09-17 DIAGNOSIS — I10 ESSENTIAL HYPERTENSION: Chronic | ICD-10-CM

## 2018-09-17 DIAGNOSIS — K21.9 GASTROESOPHAGEAL REFLUX DISEASE WITHOUT ESOPHAGITIS: Chronic | ICD-10-CM

## 2018-09-17 DIAGNOSIS — Z12.5 SCREENING FOR PROSTATE CANCER: ICD-10-CM

## 2018-09-17 DIAGNOSIS — N39.0 URINARY TRACT INFECTION WITHOUT HEMATURIA, SITE UNSPECIFIED: ICD-10-CM

## 2018-09-17 DIAGNOSIS — E78.2 MIXED HYPERLIPIDEMIA: Chronic | ICD-10-CM

## 2018-09-17 DIAGNOSIS — N18.1 CHRONIC RENAL IMPAIRMENT, STAGE 1: Chronic | ICD-10-CM

## 2018-09-17 DIAGNOSIS — R73.02 IMPAIRED GLUCOSE TOLERANCE: Primary | Chronic | ICD-10-CM

## 2018-09-17 DIAGNOSIS — D50.8 IRON DEFICIENCY ANEMIA DUE TO DIETARY CAUSES: Chronic | ICD-10-CM

## 2018-09-17 PROCEDURE — 99214 OFFICE O/P EST MOD 30 MIN: CPT | Performed by: INTERNAL MEDICINE

## 2018-09-17 RX ORDER — FUROSEMIDE 20 MG/1
20 TABLET ORAL DAILY
Qty: 90 TABLET | Refills: 1 | Status: SHIPPED | OUTPATIENT
Start: 2018-09-17 | End: 2022-01-03 | Stop reason: DRUGHIGH

## 2018-09-17 RX ORDER — PENICILLIN V POTASSIUM 500 MG/1
500 TABLET ORAL 3 TIMES DAILY
Qty: 30 TABLET | Refills: 0 | Status: SHIPPED | OUTPATIENT
Start: 2018-09-17 | End: 2018-11-13

## 2018-09-17 RX ORDER — PRAVASTATIN SODIUM 20 MG
20 TABLET ORAL NIGHTLY
Qty: 90 TABLET | Refills: 3 | Status: SHIPPED | OUTPATIENT
Start: 2018-09-17 | End: 2019-10-11 | Stop reason: SDUPTHER

## 2018-09-17 RX ORDER — LOSARTAN POTASSIUM 100 MG/1
100 TABLET ORAL DAILY
Qty: 90 TABLET | Refills: 3 | Status: SHIPPED | OUTPATIENT
Start: 2018-09-17 | End: 2019-09-23 | Stop reason: SDUPTHER

## 2018-09-17 RX ORDER — ALLOPURINOL 300 MG/1
300 TABLET ORAL DAILY
Refills: 3 | COMMUNITY
Start: 2018-07-24 | End: 2018-11-13

## 2018-09-17 RX ORDER — PANTOPRAZOLE SODIUM 40 MG/1
40 TABLET, DELAYED RELEASE ORAL DAILY
Qty: 90 TABLET | Refills: 3 | Status: SHIPPED | OUTPATIENT
Start: 2018-09-17 | End: 2019-09-23 | Stop reason: SDUPTHER

## 2018-09-17 NOTE — PROGRESS NOTES
Chief Complaint   Patient presents with   • Hypertension   • Hyperlipidemia     Subjective   Noe Barnes Sr. is a 72 y.o. male who presents to the office for follow-up and review of labs. He has hypertension and his blood pressure has been controlled.  He has GERD which is well controlled with Protonix.  He has impaired glucose tolerance and has been monitoring his dietary intake of sugars and carbohydrates.  He has hyperlipidemia and takes pravastatin daily.  He has iron deficient anemia and takes an iron supplement daily.  He has renal insufficiency and follows with nephrology.  He takes Lasix as needed for peripheral edema.  He is currently taking a 20 mg dose Monday through Friday and 10 mg on Saturday and Sunday.  He still has occasional episodes of edema, but overall this dose seems to keep it at baseline.    The following portions of the patient's history were reviewed and updated as appropriate: allergies, current medications, past family history, past medical history, past social history, past surgical history and problem list.    Review of Systems   Constitutional: Negative for chills, fatigue and fever.   HENT: Negative for congestion, sneezing, sore throat and trouble swallowing.    Eyes: Negative for visual disturbance.   Respiratory: Negative for cough, chest tightness, shortness of breath and wheezing.    Cardiovascular: Negative for chest pain, palpitations and leg swelling.   Gastrointestinal: Negative for abdominal pain, constipation, diarrhea, nausea and vomiting.   Genitourinary: Negative for dysuria, frequency and urgency.   Musculoskeletal: Negative for neck pain.   Neurological: Negative for dizziness, weakness and headaches.   Psychiatric/Behavioral:        Patient denies any feelings of depression and has not felt down, hopeless or lost interest in any activities.   All other systems reviewed and are negative.      Objective   Vitals:    09/17/18 0851   BP: 138/78   BP Location: Left  "arm   Patient Position: Sitting   Cuff Size: Adult   Pulse: 70   Temp: 97.4 °F (36.3 °C)   TempSrc: Oral   SpO2: 97%   Weight: 112 kg (247 lb)   Height: 172.7 cm (68\")   PainSc: 0-No pain     Physical Exam   Constitutional: He is oriented to person, place, and time. He appears well-developed and well-nourished. No distress.   HENT:   Head: Normocephalic and atraumatic.   Nose: Nose normal.   Mouth/Throat: Oropharynx is clear and moist. No oropharyngeal exudate.   Eyes: Pupils are equal, round, and reactive to light. Conjunctivae and EOM are normal. No scleral icterus.   Neck: Normal range of motion. Neck supple.   Cardiovascular: Normal rate, regular rhythm and normal heart sounds.  Exam reveals no gallop and no friction rub.    No murmur heard.  Pulmonary/Chest: Effort normal and breath sounds normal. No respiratory distress. He has no wheezes. He has no rales.   Abdominal: Soft. Bowel sounds are normal. He exhibits no distension. There is no tenderness. There is no rebound and no guarding.   Musculoskeletal: Normal range of motion. He exhibits no edema.   Lymphadenopathy:     He has no cervical adenopathy.   Neurological: He is alert and oriented to person, place, and time. No cranial nerve deficit.   Skin: Skin is warm and dry. No rash noted.   Psychiatric: He has a normal mood and affect. His behavior is normal. Judgment and thought content normal.   Nursing note and vitals reviewed.      Assessment/Plan   Noe was seen today for hypertension and hyperlipidemia.    Diagnoses and all orders for this visit:    Impaired glucose tolerance  -     Hemoglobin A1c; Future    Essential hypertension  -     Comprehensive Metabolic Panel; Future  -     T4, Free; Future  -     TSH; Future  -     Urinalysis With Culture If Indicated - Urine, Clean Catch; Future  -     losartan (COZAAR) 100 MG tablet; Take 1 tablet by mouth Daily. To replace Losartan with HCTZ    Mixed hyperlipidemia  -     Lipid Panel; Future  -     " pravastatin (PRAVACHOL) 20 MG tablet; Take 1 tablet by mouth Every Night.    Chronic renal impairment, stage 1  -     magnesium oxide (MAGOX) 400 (241.3 Mg) MG tablet tablet; Take 1 tablet by mouth Daily.    Iron deficiency anemia due to dietary causes  -     CBC & Differential; Future    Gastroesophageal reflux disease without esophagitis  -     pantoprazole (PROTONIX) 40 MG EC tablet; Take 1 tablet by mouth Daily.    Peripheral edema  -     furosemide (LASIX) 20 MG tablet; Take 1 tablet by mouth Daily.    Urinary tract infection without hematuria, site unspecified  -     penicillin v potassium (VEETID) 500 MG tablet; Take 1 tablet by mouth 3 (Three) Times a Day.    Screening for prostate cancer  -     PSA Screen; Future         Labs are reviewed with patient.  His glucose is slightly elevated.  Patient will continue to watch diet to help maintain control of the glucose.  Patient understands that there is an increased risk of developing diabetes in the future.  His LDL is at goal, and he will continue with pravastatin for treatment of his hyperlipidemia.  His creatinine is baseline at 1.28.  He will continue with Protonix for treatment of GERD.  His blood pressure is controlled, and he will continue with his current blood pressure medication.  His anemia is stable, and He will continue to take the daily iron supplement.  His urine culture is positive for group B strep.  This is treated with penicillin.    PHQ-2/PHQ-9 Depression Screening 9/17/2018   Little interest or pleasure in doing things 0   Feeling down, depressed, or hopeless 0   Trouble falling or staying asleep, or sleeping too much -   Feeling tired or having little energy -   Poor appetite or overeating -   Feeling bad about yourself - or that you are a failure or have let yourself or your family down -   Trouble concentrating on things, such as reading the newspaper or watching television -   Moving or speaking so slowly that other people could have  noticed. Or the opposite - being so fidgety or restless that you have been moving around a lot more than usual -   Thoughts that you would be better off dead, or of hurting yourself in some way -   Total Score 0   If you checked off any problems, how difficult have these problems made it for you to do your work, take care of things at home, or get along with other people? -         Lab on 09/14/2018   Component Date Value Ref Range Status   • Color, UA 09/14/2018 Yellow  Yellow, Straw Final   • Appearance, UA 09/14/2018 Clear  Clear Final   • pH, UA 09/14/2018 5.5  5.5 - 8.0 Final   • Specific Gravity, UA 09/14/2018 1.025  1.005 - 1.030 Final   • Glucose, UA 09/14/2018 Negative  Negative Final   • Ketones, UA 09/14/2018 Negative  Negative Final   • Bilirubin, UA 09/14/2018 Negative  Negative Final   • Blood, UA 09/14/2018 Negative  Negative Final   • Protein, UA 09/14/2018 Negative  Negative Final   • Leuk Esterase, UA 09/14/2018 Small (1+)* Negative Final   • Nitrite, UA 09/14/2018 Negative  Negative Final   • Urobilinogen, UA 09/14/2018 0.2 E.U./dL  0.2 - 1.0 E.U./dL Final   • Glucose 09/14/2018 111* 74 - 99 mg/dL Final   • BUN 09/14/2018 21* 9 - 20 mg/dL Final   • Creatinine 09/14/2018 1.28* 0.66 - 1.25 mg/dL Final   • Sodium 09/14/2018 142  137 - 145 mmol/L Final   • Potassium 09/14/2018 4.5  3.4 - 5.0 mmol/L Final   • Chloride 09/14/2018 104  98 - 107 mmol/L Final   • CO2 09/14/2018 32.0* 22.0 - 30.0 mmol/L Final   • Calcium 09/14/2018 9.6  8.4 - 10.2 mg/dL Final   • Total Protein 09/14/2018 7.2  6.3 - 8.2 g/dL Final   • Albumin 09/14/2018 3.80  3.50 - 5.00 g/dL Final   • ALT (SGPT) 09/14/2018 39  <=50 U/L Final   • AST (SGOT) 09/14/2018 47  17 - 59 U/L Final   • Alkaline Phosphatase 09/14/2018 80  38 - 126 U/L Final   • Total Bilirubin 09/14/2018 0.7  0.2 - 1.3 mg/dL Final   • eGFR Non  Amer 09/14/2018 56  42 - 98 mL/min/1.73 Final   • Globulin 09/14/2018 3.4  2.3 - 3.5 gm/dL Final   • A/G Ratio  09/14/2018 1.1  1.1 - 1.8 g/dL Final   • BUN/Creatinine Ratio 09/14/2018 16.4  7.0 - 25.0 Final   • Anion Gap 09/14/2018 6.0  5.0 - 15.0 mmol/L Final   • Hemoglobin A1C 09/14/2018 6.5* 4 - 5.6 % Final   • LDL Cholesterol  09/14/2018 64  1 - 129 mg/dL Final   • Free T4 09/14/2018 1.19  0.78 - 2.19 ng/dL Final   • TSH 09/14/2018 1.720  0.460 - 4.680 mIU/mL Final   • WBC 09/14/2018 7.79  3.20 - 9.80 10*3/mm3 Final   • RBC 09/14/2018 3.74* 4.37 - 5.74 10*6/mm3 Final   • Hemoglobin 09/14/2018 12.2* 13.7 - 17.3 g/dL Final   • Hematocrit 09/14/2018 39.1  39.0 - 49.0 % Final   • MCV 09/14/2018 104.5* 80.0 - 98.0 fL Final   • MCH 09/14/2018 32.6  26.5 - 34.0 pg Final   • MCHC 09/14/2018 31.2* 31.5 - 36.3 g/dL Final   • RDW 09/14/2018 15.4* 11.5 - 14.5 % Final   • RDW-SD 09/14/2018 57.1* 35.1 - 43.9 fl Final   • MPV 09/14/2018 8.9  8.0 - 12.0 fL Final   • Platelets 09/14/2018 190  150 - 450 10*3/mm3 Final   • Neutrophil % 09/14/2018 45.7  37.0 - 80.0 % Final   • Lymphocyte % 09/14/2018 42.0  10.0 - 50.0 % Final   • Monocyte % 09/14/2018 8.6  0.0 - 12.0 % Final   • Eosinophil % 09/14/2018 3.6  0.0 - 7.0 % Final   • Basophil % 09/14/2018 0.1  0.0 - 2.0 % Final   • Neutrophils, Absolute 09/14/2018 3.56  2.00 - 8.60 10*3/mm3 Final   • Lymphocytes, Absolute 09/14/2018 3.27  0.60 - 4.20 10*3/mm3 Final   • Monocytes, Absolute 09/14/2018 0.67  0.00 - 0.90 10*3/mm3 Final   • Eosinophils, Absolute 09/14/2018 0.28  0.00 - 0.70 10*3/mm3 Final   • Basophils, Absolute 09/14/2018 0.01  0.00 - 0.20 10*3/mm3 Final   • RBC, UA 09/14/2018 0-2* None Seen /HPF Final   • WBC, UA 09/14/2018 13-20* None Seen /HPF Final   • Bacteria, UA 09/14/2018 Trace* None Seen /HPF Final   • Squamous Epithelial Cells, UA 09/14/2018 0-2  None Seen, 0-2 /HPF Final   • Hyaline Casts, UA 09/14/2018 None Seen  None Seen /LPF Final   • Methodology 09/14/2018 Manual Light Microscopy   Final   • Urine Culture 09/14/2018 40,000-50,000 CFU/mL Streptococcus, Beta Hemolytic,  Group B*  Final    Susceptibility testing not required; These organisms are predictively sensitive to penicillin and Clindamycin therapy     Lab on 08/15/2018   Component Date Value Ref Range Status   • Glucose 08/15/2018 118* 74 - 99 mg/dL Final   • BUN 08/15/2018 32* 9 - 20 mg/dL Final   • Creatinine 08/15/2018 1.59* 0.66 - 1.25 mg/dL Final   • Sodium 08/15/2018 142  137 - 145 mmol/L Final   • Potassium 08/15/2018 4.5  3.4 - 5.0 mmol/L Final   • Chloride 08/15/2018 102  98 - 107 mmol/L Final   • CO2 08/15/2018 31.0* 22.0 - 30.0 mmol/L Final   • Calcium 08/15/2018 9.6  8.4 - 10.2 mg/dL Final   • Total Protein 08/15/2018 7.5  6.3 - 8.2 g/dL Final   • Albumin 08/15/2018 4.00  3.50 - 5.00 g/dL Final   • ALT (SGPT) 08/15/2018 46  <=50 U/L Final   • AST (SGOT) 08/15/2018 58  17 - 59 U/L Final   • Alkaline Phosphatase 08/15/2018 92  38 - 126 U/L Final   • Total Bilirubin 08/15/2018 0.6  0.2 - 1.3 mg/dL Final   • eGFR Non  Amer 08/15/2018 43  42 - 98 mL/min/1.73 Final   • Globulin 08/15/2018 3.5  2.3 - 3.5 gm/dL Final   • A/G Ratio 08/15/2018 1.1  1.1 - 1.8 g/dL Final   • BUN/Creatinine Ratio 08/15/2018 20.1  7.0 - 25.0 Final   • Anion Gap 08/15/2018 9.0  5.0 - 15.0 mmol/L Final   • Protein/Creatinine Ratio, Urine 08/15/2018 59.5  0.0 - 200.0 mg/G Crea Final   • Creatinine, Urine 08/15/2018 152.9  mg/dL Final   • Total Protein, Urine 08/15/2018 9.1  mg/dL Final   • WBC 08/15/2018 7.86  3.20 - 9.80 10*3/mm3 Final   • RBC 08/15/2018 4.03* 4.37 - 5.74 10*6/mm3 Final   • Hemoglobin 08/15/2018 12.9* 13.7 - 17.3 g/dL Final   • Hematocrit 08/15/2018 41.4  39.0 - 49.0 % Final   • MCV 08/15/2018 102.7* 80.0 - 98.0 fL Final   • MCH 08/15/2018 32.0  26.5 - 34.0 pg Final   • MCHC 08/15/2018 31.2* 31.5 - 36.3 g/dL Final   • RDW 08/15/2018 14.7* 11.5 - 14.5 % Final   • RDW-SD 08/15/2018 54.4* 35.1 - 43.9 fl Final   • MPV 08/15/2018 9.1  8.0 - 12.0 fL Final   • Platelets 08/15/2018 224  150 - 450 10*3/mm3 Final   • Neutrophil %  08/15/2018 44.2  37.0 - 80.0 % Final   • Lymphocyte % 08/15/2018 44.0  10.0 - 50.0 % Final   • Monocyte % 08/15/2018 8.4  0.0 - 12.0 % Final   • Eosinophil % 08/15/2018 3.1  0.0 - 7.0 % Final   • Basophil % 08/15/2018 0.3  0.0 - 2.0 % Final   • Neutrophils, Absolute 08/15/2018 3.48  2.00 - 8.60 10*3/mm3 Final   • Lymphocytes, Absolute 08/15/2018 3.46  0.60 - 4.20 10*3/mm3 Final   • Monocytes, Absolute 08/15/2018 0.66  0.00 - 0.90 10*3/mm3 Final   • Eosinophils, Absolute 08/15/2018 0.24  0.00 - 0.70 10*3/mm3 Final   • Basophils, Absolute 08/15/2018 0.02  0.00 - 0.20 10*3/mm3 Final   ]

## 2018-10-16 DIAGNOSIS — I10 ESSENTIAL HYPERTENSION: Chronic | ICD-10-CM

## 2018-10-16 RX ORDER — METOPROLOL SUCCINATE 50 MG/1
TABLET, EXTENDED RELEASE ORAL
Qty: 60 TABLET | Refills: 4 | Status: SHIPPED | OUTPATIENT
Start: 2018-10-16 | End: 2018-11-13

## 2018-10-22 DIAGNOSIS — M1A.0710 IDIOPATHIC CHRONIC GOUT OF RIGHT FOOT WITHOUT TOPHUS: Chronic | ICD-10-CM

## 2018-10-22 RX ORDER — ALLOPURINOL 300 MG/1
300 TABLET ORAL DAILY
Qty: 90 TABLET | Refills: 1 | Status: SHIPPED | OUTPATIENT
Start: 2018-10-22 | End: 2019-04-25 | Stop reason: SDUPTHER

## 2018-10-26 ENCOUNTER — CLINICAL SUPPORT (OUTPATIENT)
Dept: FAMILY MEDICINE CLINIC | Facility: CLINIC | Age: 73
End: 2018-10-26

## 2018-10-26 DIAGNOSIS — Z23 NEED FOR INFLUENZA VACCINATION: Primary | ICD-10-CM

## 2018-10-26 PROCEDURE — G0008 ADMIN INFLUENZA VIRUS VAC: HCPCS | Performed by: INTERNAL MEDICINE

## 2018-10-26 PROCEDURE — 90662 IIV NO PRSV INCREASED AG IM: CPT | Performed by: INTERNAL MEDICINE

## 2018-11-06 ENCOUNTER — OFFICE VISIT (OUTPATIENT)
Dept: SURGERY | Facility: CLINIC | Age: 73
End: 2018-11-06

## 2018-11-06 VITALS
HEART RATE: 66 BPM | BODY MASS INDEX: 37.89 KG/M2 | DIASTOLIC BLOOD PRESSURE: 70 MMHG | WEIGHT: 250 LBS | SYSTOLIC BLOOD PRESSURE: 110 MMHG | HEIGHT: 68 IN | TEMPERATURE: 97.2 F

## 2018-11-06 DIAGNOSIS — Z86.010 HX OF ADENOMATOUS POLYP OF COLON: Primary | ICD-10-CM

## 2018-11-06 PROCEDURE — 99213 OFFICE O/P EST LOW 20 MIN: CPT | Performed by: SURGERY

## 2018-11-06 RX ORDER — ISOSORBIDE MONONITRATE 60 MG/1
60 TABLET, EXTENDED RELEASE ORAL DAILY
Refills: 0 | COMMUNITY
Start: 2018-10-22

## 2018-11-06 RX ORDER — DEXTROSE AND SODIUM CHLORIDE 5; .45 G/100ML; G/100ML
100 INJECTION, SOLUTION INTRAVENOUS CONTINUOUS
Status: CANCELLED | OUTPATIENT
Start: 2018-11-26

## 2018-11-06 NOTE — H&P (VIEW-ONLY)
Chief Complaint   Patient presents with   • Follow-up     1 yr amber to [facility]. Colonoscopy.       Noe Barnes Sr. is a 72 y.o. male referred today for evaluation for colonoscopy.  He notes no change in bowel habits, no blood in the stool.  Patient is 1 year out from his first colonoscopy for which had a prep adequate to see larger polyps and had a villotubular adenoma nearly 8 mm in size removed piecemeal from his rectum with intermediate grade dysplasia.  Patient was also noted to have diverticulosis.  I recommend he have follow-up colonoscopy.    Prior Colonoscopy:yes  Prior Polyps:yes  Family History of Colon Cancer:no  On anticoagulation:no    Past Surgical History:   Procedure Laterality Date   • CIRCUMCISION  12/2009    CIRCUMCISION W/REGIONL BLOCK 89347 (1)      • COLONOSCOPY  11/30/2016   • COLONOSCOPY N/A 12/18/2017    Procedure: COLONOSCOPY;  Surgeon: Ger Thomas MD;  Location: [facility] ENDOSCOPY;  Service:    • INJECTION OF MEDICATION  01/15/2016    Depo Medrol (Methylprednisone) 80mg (1)       Past Medical History:   Diagnosis Date   • Acute bronchitis    • Acute urinary tract infection    • Candidal balanitis    • Dysfunction of eustachian tube    • Essential hypertension    • Gastroesophageal reflux disease without esophagitis    • Hypertriglyceridemia    • Impaired glucose tolerance    • Iron deficiency anemia due to dietary causes    • Mixed hyperlipidemia    • Pruritic rash     Resolved   • Skin lesion of scalp    • Tick bite    • Upper respiratory infection      Social History     Social History   • Marital status:      Spouse name: N/A   • Number of children: N/A   • Years of education: N/A     Occupational History   • Not on file.     Social History Main Topics   • Smoking status: Former Smoker     Quit date: 05/2015   • Smokeless tobacco: Never Used   • Alcohol use No   • Drug use: No   • Sexual activity: Defer     Other Topics Concern   • Not on file     Social History Narrative    • No narrative on file     Allergies   Allergen Reactions   • Fluconazole Rash   • Hydrochlorothiazide Rash   • Sulfa Antibiotics Itching     itching       Home Medications:  Prior to Admission medications    Medication Sig Start Date End Date Taking? Authorizing Provider   allopurinol (ZYLOPRIM) 300 MG tablet Take 300 mg by mouth Daily. 7/24/18  Yes Cathy Tapia MD   allopurinol (ZYLOPRIM) 300 MG tablet TAKE 1 TABLET BY MOUTH DAILY 10/22/18  Yes Elia Herron MD   ferrous sulfate 325 (65 FE) MG tablet Take 325 mg by mouth Daily With Breakfast.   Yes Cathy Tapia MD   finasteride (PROSCAR) 5 MG tablet Take 1 tablet by mouth Daily. 11/21/17  Yes Cathy Tapia MD   fluticasone (FLONASE) 50 MCG/ACT nasal spray 2 sprays into each nostril Daily.   Yes Cathy Tapia MD   furosemide (LASIX) 20 MG tablet Take 1 tablet by mouth Daily. 9/17/18  Yes Elia Herron MD   hydrALAZINE (APRESOLINE) 50 MG tablet TAKE 1 TABLET BY MOUTH TWO TIMES A DAY 6/28/18  Yes Elia Herron MD   isosorbide mononitrate (IMDUR) 60 MG 24 hr tablet Take 60 mg by mouth Daily. 10/22/18  Yes Cathy Tapia MD   losartan (COZAAR) 100 MG tablet Take 1 tablet by mouth Daily. To replace Losartan with HCTZ 9/17/18  Yes Elia Herron MD   magnesium oxide (MAGOX) 400 (241.3 Mg) MG tablet tablet Take 1 tablet by mouth Daily. 9/17/18  Yes Elia Herron MD   metoprolol succinate XL (TOPROL-XL) 100 MG 24 hr tablet Take 100 mg by mouth 2 (Two) Times a Day. 6/22/18  Yes Howie Sandy MD   nystatin (MYCOSTATIN) 100696 UNIT/GM ointment Apply  topically 3 (Three) Times a Day. 3/23/18  Yes Perla Clark APRN   pantoprazole (PROTONIX) 40 MG EC tablet Take 1 tablet by mouth Daily. 9/17/18  Yes Elia Herron MD   pravastatin (PRAVACHOL) 20 MG tablet Take 1 tablet by mouth Every Night. 9/17/18  Yes Elia Herron MD   spironolactone (ALDACTONE) 25 MG tablet Take 25 mg by mouth Daily. 6/22/18 6/18/19 Yes  Howie Sandy MD   tolnaftate (TINACTIN) 1 % cream Apply 1 application topically 2 (Two) Times a Day.   Yes Provider, MD Cathy   triamcinolone (KENALOG) 0.1 % cream  3/21/18  Yes Provider, MD Cathy   metoprolol succinate XL (TOPROL-XL) 50 MG 24 hr tablet TAKE 1 TABLET BY MOUTH TWO TIMES A DAY 10/16/18   Elia Herron MD   penicillin v potassium (VEETID) 500 MG tablet Take 1 tablet by mouth 3 (Three) Times a Day. 9/17/18   Elia Herron MD       Review of Systems   Constitutional: Negative.    HENT: Negative for hearing loss, nosebleeds and trouble swallowing.    Respiratory: Negative for apnea, chest tightness and shortness of breath.    Cardiovascular: Negative for chest pain and palpitations.   Gastrointestinal: Negative for abdominal distention, abdominal pain, blood in stool, constipation, diarrhea, nausea and vomiting.   Genitourinary: Negative for difficulty urinating, dysuria, frequency and urgency.   Musculoskeletal: Negative for back pain, joint swelling and neck pain.   Skin: Negative for rash.   Neurological: Negative for dizziness, seizures, weakness, light-headedness, numbness and headaches.   Hematological: Negative for adenopathy.   Psychiatric/Behavioral: Negative for agitation. The patient is not nervous/anxious.        Vitals:    11/06/18 0836   BP: 110/70   Pulse: 66   Temp: 97.2 °F (36.2 °C)       Physical Exam   Constitutional: He is oriented to person, place, and time. He appears well-developed and well-nourished. No distress.   HENT:   Head: Normocephalic and atraumatic.   Nose: Nose normal.   Eyes: Conjunctivae are normal.   Neck: Normal range of motion. No tracheal deviation present. No thyromegaly present.   Cardiovascular: Normal rate, regular rhythm and normal heart sounds.    No murmur heard.  Pulmonary/Chest: Effort normal and breath sounds normal. No respiratory distress. He has no wheezes. He has no rales. He exhibits no tenderness.   Abdominal: Soft. He exhibits no  distension. There is no tenderness. There is no rebound and no guarding. No hernia.   Musculoskeletal: He exhibits no tenderness or deformity.   Neurological: He is alert and oriented to person, place, and time.   Skin: Skin is warm and dry. No rash noted.   Psychiatric: He has a normal mood and affect. His behavior is normal. Judgment and thought content normal.   Vitals reviewed.      Assessment     In need of   colonoscopy.    Plan     Risks, benefits, rationale and prep for colonoscopy have been discussed with the patient.  The patient indicates understanding of these issues and agrees with the plan.

## 2018-11-06 NOTE — PATIENT INSTRUCTIONS

## 2018-11-06 NOTE — PROGRESS NOTES
Chief Complaint   Patient presents with   • Follow-up     1 yr amber to [facility]. Colonoscopy.       Noe Barnes Sr. is a 72 y.o. male referred today for evaluation for colonoscopy.  He notes no change in bowel habits, no blood in the stool.  Patient is 1 year out from his first colonoscopy for which had a prep adequate to see larger polyps and had a villotubular adenoma nearly 8 mm in size removed piecemeal from his rectum with intermediate grade dysplasia.  Patient was also noted to have diverticulosis.  I recommend he have follow-up colonoscopy.    Prior Colonoscopy:yes  Prior Polyps:yes  Family History of Colon Cancer:no  On anticoagulation:no    Past Surgical History:   Procedure Laterality Date   • CIRCUMCISION  12/2009    CIRCUMCISION W/REGIONL BLOCK 18599 (1)      • COLONOSCOPY  11/30/2016   • COLONOSCOPY N/A 12/18/2017    Procedure: COLONOSCOPY;  Surgeon: Ger Thomas MD;  Location: [facility] ENDOSCOPY;  Service:    • INJECTION OF MEDICATION  01/15/2016    Depo Medrol (Methylprednisone) 80mg (1)       Past Medical History:   Diagnosis Date   • Acute bronchitis    • Acute urinary tract infection    • Candidal balanitis    • Dysfunction of eustachian tube    • Essential hypertension    • Gastroesophageal reflux disease without esophagitis    • Hypertriglyceridemia    • Impaired glucose tolerance    • Iron deficiency anemia due to dietary causes    • Mixed hyperlipidemia    • Pruritic rash     Resolved   • Skin lesion of scalp    • Tick bite    • Upper respiratory infection      Social History     Social History   • Marital status:      Spouse name: N/A   • Number of children: N/A   • Years of education: N/A     Occupational History   • Not on file.     Social History Main Topics   • Smoking status: Former Smoker     Quit date: 05/2015   • Smokeless tobacco: Never Used   • Alcohol use No   • Drug use: No   • Sexual activity: Defer     Other Topics Concern   • Not on file     Social History Narrative    • No narrative on file     Allergies   Allergen Reactions   • Fluconazole Rash   • Hydrochlorothiazide Rash   • Sulfa Antibiotics Itching     itching       Home Medications:  Prior to Admission medications    Medication Sig Start Date End Date Taking? Authorizing Provider   allopurinol (ZYLOPRIM) 300 MG tablet Take 300 mg by mouth Daily. 7/24/18  Yes Cathy Tapia MD   allopurinol (ZYLOPRIM) 300 MG tablet TAKE 1 TABLET BY MOUTH DAILY 10/22/18  Yes Elia Herron MD   ferrous sulfate 325 (65 FE) MG tablet Take 325 mg by mouth Daily With Breakfast.   Yes Cathy Tapia MD   finasteride (PROSCAR) 5 MG tablet Take 1 tablet by mouth Daily. 11/21/17  Yes Cathy Tapia MD   fluticasone (FLONASE) 50 MCG/ACT nasal spray 2 sprays into each nostril Daily.   Yes Cathy Tapia MD   furosemide (LASIX) 20 MG tablet Take 1 tablet by mouth Daily. 9/17/18  Yes Elia Herron MD   hydrALAZINE (APRESOLINE) 50 MG tablet TAKE 1 TABLET BY MOUTH TWO TIMES A DAY 6/28/18  Yes Elia Herron MD   isosorbide mononitrate (IMDUR) 60 MG 24 hr tablet Take 60 mg by mouth Daily. 10/22/18  Yes Cathy Tapia MD   losartan (COZAAR) 100 MG tablet Take 1 tablet by mouth Daily. To replace Losartan with HCTZ 9/17/18  Yes Elia Herron MD   magnesium oxide (MAGOX) 400 (241.3 Mg) MG tablet tablet Take 1 tablet by mouth Daily. 9/17/18  Yes Elia Herron MD   metoprolol succinate XL (TOPROL-XL) 100 MG 24 hr tablet Take 100 mg by mouth 2 (Two) Times a Day. 6/22/18  Yes Howie Sandy MD   nystatin (MYCOSTATIN) 015515 UNIT/GM ointment Apply  topically 3 (Three) Times a Day. 3/23/18  Yes Perla Clark APRN   pantoprazole (PROTONIX) 40 MG EC tablet Take 1 tablet by mouth Daily. 9/17/18  Yes Elia Herron MD   pravastatin (PRAVACHOL) 20 MG tablet Take 1 tablet by mouth Every Night. 9/17/18  Yes Elia Herron MD   spironolactone (ALDACTONE) 25 MG tablet Take 25 mg by mouth Daily. 6/22/18 6/18/19 Yes  Howie Sandy MD   tolnaftate (TINACTIN) 1 % cream Apply 1 application topically 2 (Two) Times a Day.   Yes Provider, MD Cathy   triamcinolone (KENALOG) 0.1 % cream  3/21/18  Yes Provider, MD Cathy   metoprolol succinate XL (TOPROL-XL) 50 MG 24 hr tablet TAKE 1 TABLET BY MOUTH TWO TIMES A DAY 10/16/18   Elia Herron MD   penicillin v potassium (VEETID) 500 MG tablet Take 1 tablet by mouth 3 (Three) Times a Day. 9/17/18   Elia Herron MD       Review of Systems   Constitutional: Negative.    HENT: Negative for hearing loss, nosebleeds and trouble swallowing.    Respiratory: Negative for apnea, chest tightness and shortness of breath.    Cardiovascular: Negative for chest pain and palpitations.   Gastrointestinal: Negative for abdominal distention, abdominal pain, blood in stool, constipation, diarrhea, nausea and vomiting.   Genitourinary: Negative for difficulty urinating, dysuria, frequency and urgency.   Musculoskeletal: Negative for back pain, joint swelling and neck pain.   Skin: Negative for rash.   Neurological: Negative for dizziness, seizures, weakness, light-headedness, numbness and headaches.   Hematological: Negative for adenopathy.   Psychiatric/Behavioral: Negative for agitation. The patient is not nervous/anxious.        Vitals:    11/06/18 0836   BP: 110/70   Pulse: 66   Temp: 97.2 °F (36.2 °C)       Physical Exam   Constitutional: He is oriented to person, place, and time. He appears well-developed and well-nourished. No distress.   HENT:   Head: Normocephalic and atraumatic.   Nose: Nose normal.   Eyes: Conjunctivae are normal.   Neck: Normal range of motion. No tracheal deviation present. No thyromegaly present.   Cardiovascular: Normal rate, regular rhythm and normal heart sounds.    No murmur heard.  Pulmonary/Chest: Effort normal and breath sounds normal. No respiratory distress. He has no wheezes. He has no rales. He exhibits no tenderness.   Abdominal: Soft. He exhibits no  distension. There is no tenderness. There is no rebound and no guarding. No hernia.   Musculoskeletal: He exhibits no tenderness or deformity.   Neurological: He is alert and oriented to person, place, and time.   Skin: Skin is warm and dry. No rash noted.   Psychiatric: He has a normal mood and affect. His behavior is normal. Judgment and thought content normal.   Vitals reviewed.      Assessment     In need of   colonoscopy.    Plan     Risks, benefits, rationale and prep for colonoscopy have been discussed with the patient.  The patient indicates understanding of these issues and agrees with the plan.

## 2018-11-26 ENCOUNTER — ANESTHESIA (OUTPATIENT)
Dept: GASTROENTEROLOGY | Facility: HOSPITAL | Age: 73
End: 2018-11-26

## 2018-11-26 ENCOUNTER — HOSPITAL ENCOUNTER (OUTPATIENT)
Facility: HOSPITAL | Age: 73
Setting detail: HOSPITAL OUTPATIENT SURGERY
Discharge: HOME | End: 2018-11-26
Attending: SURGERY | Admitting: SURGERY

## 2018-11-26 ENCOUNTER — ANESTHESIA EVENT (OUTPATIENT)
Dept: GASTROENTEROLOGY | Facility: HOSPITAL | Age: 73
End: 2018-11-26

## 2018-11-26 VITALS
OXYGEN SATURATION: 95 % | BODY MASS INDEX: 37.42 KG/M2 | WEIGHT: 246.91 LBS | SYSTOLIC BLOOD PRESSURE: 140 MMHG | DIASTOLIC BLOOD PRESSURE: 67 MMHG | HEART RATE: 79 BPM | HEIGHT: 68 IN | RESPIRATION RATE: 18 BRPM | TEMPERATURE: 97 F

## 2018-11-26 DIAGNOSIS — Z86.010 HX OF ADENOMATOUS POLYP OF COLON: ICD-10-CM

## 2018-11-26 PROCEDURE — 88305 TISSUE EXAM BY PATHOLOGIST: CPT | Performed by: PATHOLOGY

## 2018-11-26 PROCEDURE — 45380 COLONOSCOPY AND BIOPSY: CPT | Performed by: SURGERY

## 2018-11-26 PROCEDURE — 25010000002 PROPOFOL 10 MG/ML EMULSION: Performed by: NURSE ANESTHETIST, CERTIFIED REGISTERED

## 2018-11-26 PROCEDURE — 88305 TISSUE EXAM BY PATHOLOGIST: CPT | Performed by: SURGERY

## 2018-11-26 RX ORDER — DEXTROSE AND SODIUM CHLORIDE 5; .45 G/100ML; G/100ML
100 INJECTION, SOLUTION INTRAVENOUS CONTINUOUS
Status: DISCONTINUED | OUTPATIENT
Start: 2018-11-26 | End: 2018-11-26 | Stop reason: HOSPADM

## 2018-11-26 RX ORDER — PROPOFOL 10 MG/ML
VIAL (ML) INTRAVENOUS AS NEEDED
Status: DISCONTINUED | OUTPATIENT
Start: 2018-11-26 | End: 2018-11-26 | Stop reason: SURG

## 2018-11-26 RX ORDER — LIDOCAINE HYDROCHLORIDE 10 MG/ML
INJECTION, SOLUTION INFILTRATION; PERINEURAL AS NEEDED
Status: DISCONTINUED | OUTPATIENT
Start: 2018-11-26 | End: 2018-11-26 | Stop reason: SURG

## 2018-11-26 RX ADMIN — PROPOFOL 50 MG: 10 INJECTION, EMULSION INTRAVENOUS at 10:42

## 2018-11-26 RX ADMIN — PROPOFOL 100 MG: 10 INJECTION, EMULSION INTRAVENOUS at 10:39

## 2018-11-26 RX ADMIN — LIDOCAINE HYDROCHLORIDE 50 MG: 10 INJECTION, SOLUTION INFILTRATION; PERINEURAL at 10:39

## 2018-11-26 RX ADMIN — DEXTROSE AND SODIUM CHLORIDE 100 ML/HR: 5; 450 INJECTION, SOLUTION INTRAVENOUS at 09:41

## 2018-11-26 RX ADMIN — PROPOFOL 50 MG: 10 INJECTION, EMULSION INTRAVENOUS at 10:45

## 2018-11-26 NOTE — ANESTHESIA PREPROCEDURE EVALUATION
Anesthesia Evaluation     Patient summary reviewed and Nursing notes reviewed   NPO Solid Status: > 8 hours  NPO Liquid Status: > 2 hours           Airway   Mallampati: II  TM distance: >3 FB  Neck ROM: full  No difficulty expected  Dental    (+) lower dentures and upper dentures    Pulmonary - normal exam   (+) a smoker Former,   Cardiovascular     Rhythm: regular  Rate: normal        Neuro/Psych- negative ROS  GI/Hepatic/Renal/Endo    (+) obesity,       Musculoskeletal (-) negative ROS    Abdominal    Substance History      OB/GYN          Other                        Anesthesia Plan    ASA 3     MAC     intravenous induction   Anesthetic plan, all risks, benefits, and alternatives have been provided, discussed and informed consent has been obtained with: patient.    Plan discussed with CRNA.

## 2018-11-27 LAB
LAB AP CASE REPORT: NORMAL
PATH REPORT.FINAL DX SPEC: NORMAL
PATH REPORT.GROSS SPEC: NORMAL

## 2018-12-03 ENCOUNTER — OFFICE VISIT (OUTPATIENT)
Dept: SURGERY | Facility: CLINIC | Age: 73
End: 2018-12-03

## 2018-12-03 VITALS
DIASTOLIC BLOOD PRESSURE: 60 MMHG | TEMPERATURE: 98.3 F | WEIGHT: 252 LBS | BODY MASS INDEX: 38.19 KG/M2 | HEIGHT: 68 IN | SYSTOLIC BLOOD PRESSURE: 132 MMHG | HEART RATE: 87 BPM

## 2018-12-03 DIAGNOSIS — Z86.010 HX OF ADENOMATOUS POLYP OF COLON: Primary | ICD-10-CM

## 2018-12-03 PROCEDURE — 99212 OFFICE O/P EST SF 10 MIN: CPT | Performed by: SURGERY

## 2018-12-03 RX ORDER — METOPROLOL SUCCINATE 50 MG/1
50 TABLET, EXTENDED RELEASE ORAL 2 TIMES DAILY
Refills: 4 | COMMUNITY
Start: 2018-11-15 | End: 2020-06-25 | Stop reason: SDUPTHER

## 2018-12-03 NOTE — PATIENT INSTRUCTIONS

## 2018-12-03 NOTE — PROGRESS NOTES
72-year-old gentleman here after recent follow-up colonoscopy for history of a piecemeal removal of a tubovillous polyp from his rectum.  Patient had a much better job with his prep this time.  He has small residual polyp at the rectosigmoid area which was removed.  Patient was also noted to have diverticulosis.  I would recommend he have another colonoscopy in 5 years or sooner if he has any concerns or questions

## 2018-12-26 DIAGNOSIS — I10 ESSENTIAL HYPERTENSION: Chronic | ICD-10-CM

## 2018-12-27 RX ORDER — HYDRALAZINE HYDROCHLORIDE 50 MG/1
TABLET, FILM COATED ORAL
Qty: 180 TABLET | Refills: 1 | Status: SHIPPED | OUTPATIENT
Start: 2018-12-27 | End: 2019-07-02 | Stop reason: SDUPTHER

## 2018-12-28 ENCOUNTER — APPOINTMENT (OUTPATIENT)
Dept: GENERAL RADIOLOGY | Facility: HOSPITAL | Age: 73
End: 2018-12-28

## 2018-12-28 ENCOUNTER — APPOINTMENT (OUTPATIENT)
Dept: CT IMAGING | Facility: HOSPITAL | Age: 73
End: 2018-12-28

## 2018-12-28 ENCOUNTER — HOSPITAL ENCOUNTER (INPATIENT)
Facility: HOSPITAL | Age: 73
LOS: 1 days | Discharge: HOME | End: 2018-12-29
Attending: EMERGENCY MEDICINE | Admitting: FAMILY MEDICINE

## 2018-12-28 DIAGNOSIS — I82.4Y2 ACUTE DEEP VEIN THROMBOSIS (DVT) OF PROXIMAL VEIN OF LEFT LOWER EXTREMITY (HCC): ICD-10-CM

## 2018-12-28 DIAGNOSIS — R26.89 DECREASED FUNCTIONAL MOBILITY: ICD-10-CM

## 2018-12-28 DIAGNOSIS — I26.99 OTHER ACUTE PULMONARY EMBOLISM WITHOUT ACUTE COR PULMONALE (HCC): Primary | ICD-10-CM

## 2018-12-28 PROBLEM — N40.0 BPH (BENIGN PROSTATIC HYPERPLASIA): Status: ACTIVE | Noted: 2018-12-28

## 2018-12-28 PROBLEM — D72.829 LEUKOCYTOSIS: Status: ACTIVE | Noted: 2018-12-28

## 2018-12-28 PROBLEM — N18.30 CKD (CHRONIC KIDNEY DISEASE) STAGE 3, GFR 30-59 ML/MIN (HCC): Status: ACTIVE | Noted: 2018-12-28

## 2018-12-28 PROBLEM — I50.30 (HFPEF) HEART FAILURE WITH PRESERVED EJECTION FRACTION: Status: ACTIVE | Noted: 2018-12-28

## 2018-12-28 PROBLEM — O22.30 DVT (DEEP VEIN THROMBOSIS) IN PREGNANCY: Status: ACTIVE | Noted: 2018-12-28

## 2018-12-28 LAB
ALBUMIN SERPL-MCNC: 4.1 G/DL (ref 3.4–4.8)
ALBUMIN/GLOB SERPL: 1.3 G/DL (ref 1.1–1.8)
ALP SERPL-CCNC: 86 U/L (ref 38–126)
ALT SERPL W P-5'-P-CCNC: 31 U/L (ref 21–72)
ANION GAP SERPL CALCULATED.3IONS-SCNC: 7 MMOL/L (ref 5–15)
APTT PPP: 24.8 SECONDS (ref 20–40.3)
AST SERPL-CCNC: 48 U/L (ref 17–59)
BASOPHILS # BLD AUTO: 0.01 10*3/MM3 (ref 0–0.2)
BASOPHILS NFR BLD AUTO: 0.1 % (ref 0–2)
BILIRUB SERPL-MCNC: 0.7 MG/DL (ref 0.2–1.3)
BUN BLD-MCNC: 26 MG/DL (ref 7–21)
BUN/CREAT SERPL: 19.8 (ref 7–25)
CALCIUM SPEC-SCNC: 9.3 MG/DL (ref 8.4–10.2)
CHLORIDE SERPL-SCNC: 93 MMOL/L (ref 95–110)
CO2 SERPL-SCNC: 35 MMOL/L (ref 22–31)
CREAT BLD-MCNC: 1.31 MG/DL (ref 0.7–1.3)
D-DIMER, QUANTITATIVE (MAD,POW, STR): >4000 NG/ML (FEU) (ref 0–470)
DEPRECATED RDW RBC AUTO: 50.3 FL (ref 35.1–43.9)
EOSINOPHIL # BLD AUTO: 0.32 10*3/MM3 (ref 0–0.7)
EOSINOPHIL NFR BLD AUTO: 2.9 % (ref 0–7)
ERYTHROCYTE [DISTWIDTH] IN BLOOD BY AUTOMATED COUNT: 14.1 % (ref 11.5–14.5)
GFR SERPL CREATININE-BSD FRML MDRD: 54 ML/MIN/1.73 (ref 42–98)
GLOBULIN UR ELPH-MCNC: 3.1 GM/DL (ref 2.3–3.5)
GLUCOSE BLD-MCNC: 107 MG/DL (ref 60–100)
HCT VFR BLD AUTO: 37.3 % (ref 39–49)
HGB BLD-MCNC: 12.6 G/DL (ref 13.7–17.3)
HOLD SPECIMEN: NORMAL
IMM GRANULOCYTES # BLD AUTO: 0.04 10*3/MM3 (ref 0–0.02)
IMM GRANULOCYTES NFR BLD AUTO: 0.4 % (ref 0–0.5)
INR PPP: 0.99 (ref 0.8–1.2)
LYMPHOCYTES # BLD AUTO: 3.29 10*3/MM3 (ref 0.6–4.2)
LYMPHOCYTES NFR BLD AUTO: 29.9 % (ref 10–50)
MAGNESIUM SERPL-MCNC: 1.6 MG/DL (ref 1.6–2.3)
MCH RBC QN AUTO: 33.2 PG (ref 26.5–34)
MCHC RBC AUTO-ENTMCNC: 33.8 G/DL (ref 31.5–36.3)
MCV RBC AUTO: 98.4 FL (ref 80–98)
MONOCYTES # BLD AUTO: 0.86 10*3/MM3 (ref 0–0.9)
MONOCYTES NFR BLD AUTO: 7.8 % (ref 0–12)
NEUTROPHILS # BLD AUTO: 6.5 10*3/MM3 (ref 2–8.6)
NEUTROPHILS NFR BLD AUTO: 58.9 % (ref 37–80)
NT-PROBNP SERPL-MCNC: 235 PG/ML (ref 0–900)
PLATELET # BLD AUTO: 167 10*3/MM3 (ref 150–450)
PMV BLD AUTO: 9.1 FL (ref 8–12)
POTASSIUM BLD-SCNC: 4.5 MMOL/L (ref 3.5–5.1)
PROT SERPL-MCNC: 7.2 G/DL (ref 6.3–8.6)
PROTHROMBIN TIME: 12.9 SECONDS (ref 11.1–15.3)
RBC # BLD AUTO: 3.79 10*6/MM3 (ref 4.37–5.74)
SODIUM BLD-SCNC: 135 MMOL/L (ref 137–145)
TROPONIN I SERPL-MCNC: <0.012 NG/ML
WBC NRBC COR # BLD: 11.02 10*3/MM3 (ref 3.2–9.8)

## 2018-12-28 PROCEDURE — 96372 THER/PROPH/DIAG INJ SC/IM: CPT

## 2018-12-28 PROCEDURE — 99285 EMERGENCY DEPT VISIT HI MDM: CPT

## 2018-12-28 PROCEDURE — 94640 AIRWAY INHALATION TREATMENT: CPT

## 2018-12-28 PROCEDURE — 0 IOPAMIDOL PER 1 ML: Performed by: EMERGENCY MEDICINE

## 2018-12-28 PROCEDURE — 94760 N-INVAS EAR/PLS OXIMETRY 1: CPT

## 2018-12-28 PROCEDURE — 25010000002 ENOXAPARIN PER 10 MG: Performed by: EMERGENCY MEDICINE

## 2018-12-28 PROCEDURE — 85610 PROTHROMBIN TIME: CPT | Performed by: EMERGENCY MEDICINE

## 2018-12-28 PROCEDURE — 93005 ELECTROCARDIOGRAM TRACING: CPT | Performed by: EMERGENCY MEDICINE

## 2018-12-28 PROCEDURE — 94799 UNLISTED PULMONARY SVC/PX: CPT

## 2018-12-28 PROCEDURE — 85379 FIBRIN DEGRADATION QUANT: CPT | Performed by: EMERGENCY MEDICINE

## 2018-12-28 PROCEDURE — 71275 CT ANGIOGRAPHY CHEST: CPT

## 2018-12-28 PROCEDURE — 80053 COMPREHEN METABOLIC PANEL: CPT | Performed by: EMERGENCY MEDICINE

## 2018-12-28 PROCEDURE — 71045 X-RAY EXAM CHEST 1 VIEW: CPT

## 2018-12-28 PROCEDURE — 93010 ELECTROCARDIOGRAM REPORT: CPT | Performed by: INTERNAL MEDICINE

## 2018-12-28 PROCEDURE — 84484 ASSAY OF TROPONIN QUANT: CPT | Performed by: EMERGENCY MEDICINE

## 2018-12-28 PROCEDURE — 83735 ASSAY OF MAGNESIUM: CPT | Performed by: STUDENT IN AN ORGANIZED HEALTH CARE EDUCATION/TRAINING PROGRAM

## 2018-12-28 PROCEDURE — 99284 EMERGENCY DEPT VISIT MOD MDM: CPT

## 2018-12-28 PROCEDURE — 85730 THROMBOPLASTIN TIME PARTIAL: CPT | Performed by: EMERGENCY MEDICINE

## 2018-12-28 PROCEDURE — 85025 COMPLETE CBC W/AUTO DIFF WBC: CPT | Performed by: EMERGENCY MEDICINE

## 2018-12-28 PROCEDURE — 83880 ASSAY OF NATRIURETIC PEPTIDE: CPT | Performed by: EMERGENCY MEDICINE

## 2018-12-28 RX ORDER — SODIUM CHLORIDE 0.9 % (FLUSH) 0.9 %
10 SYRINGE (ML) INJECTION AS NEEDED
Status: DISCONTINUED | OUTPATIENT
Start: 2018-12-28 | End: 2018-12-29 | Stop reason: HOSPADM

## 2018-12-28 RX ORDER — ONDANSETRON 4 MG/1
4 TABLET, ORALLY DISINTEGRATING ORAL EVERY 6 HOURS PRN
Status: DISCONTINUED | OUTPATIENT
Start: 2018-12-28 | End: 2018-12-29 | Stop reason: HOSPADM

## 2018-12-28 RX ORDER — ONDANSETRON 2 MG/ML
4 INJECTION INTRAMUSCULAR; INTRAVENOUS EVERY 6 HOURS PRN
Status: DISCONTINUED | OUTPATIENT
Start: 2018-12-28 | End: 2018-12-29 | Stop reason: HOSPADM

## 2018-12-28 RX ORDER — ALLOPURINOL 300 MG/1
300 TABLET ORAL DAILY
Status: DISCONTINUED | OUTPATIENT
Start: 2018-12-29 | End: 2018-12-29 | Stop reason: HOSPADM

## 2018-12-28 RX ORDER — FLUTICASONE PROPIONATE 50 MCG
2 SPRAY, SUSPENSION (ML) NASAL DAILY PRN
Status: DISCONTINUED | OUTPATIENT
Start: 2018-12-28 | End: 2018-12-29 | Stop reason: HOSPADM

## 2018-12-28 RX ORDER — LANOLIN ALCOHOL/MO/W.PET/CERES
5 CREAM (GRAM) TOPICAL NIGHTLY PRN
Status: DISCONTINUED | OUTPATIENT
Start: 2018-12-28 | End: 2018-12-29 | Stop reason: HOSPADM

## 2018-12-28 RX ORDER — FUROSEMIDE 20 MG/1
20 TABLET ORAL DAILY
Status: DISCONTINUED | OUTPATIENT
Start: 2018-12-29 | End: 2018-12-29 | Stop reason: HOSPADM

## 2018-12-28 RX ORDER — ATORVASTATIN CALCIUM 10 MG/1
10 TABLET, FILM COATED ORAL DAILY
Status: DISCONTINUED | OUTPATIENT
Start: 2018-12-29 | End: 2018-12-29 | Stop reason: HOSPADM

## 2018-12-28 RX ORDER — ONDANSETRON 4 MG/1
4 TABLET, FILM COATED ORAL EVERY 6 HOURS PRN
Status: DISCONTINUED | OUTPATIENT
Start: 2018-12-28 | End: 2018-12-29 | Stop reason: HOSPADM

## 2018-12-28 RX ORDER — FERROUS SULFATE TAB EC 324 MG (65 MG FE EQUIVALENT) 324 (65 FE) MG
324 TABLET DELAYED RESPONSE ORAL
Status: DISCONTINUED | OUTPATIENT
Start: 2018-12-29 | End: 2018-12-29 | Stop reason: HOSPADM

## 2018-12-28 RX ORDER — FINASTERIDE 5 MG/1
5 TABLET, FILM COATED ORAL DAILY
Status: DISCONTINUED | OUTPATIENT
Start: 2018-12-29 | End: 2018-12-29 | Stop reason: HOSPADM

## 2018-12-28 RX ORDER — ACETAMINOPHEN 325 MG/1
650 TABLET ORAL EVERY 4 HOURS PRN
Status: DISCONTINUED | OUTPATIENT
Start: 2018-12-28 | End: 2018-12-29 | Stop reason: HOSPADM

## 2018-12-28 RX ORDER — SODIUM CHLORIDE 0.9 % (FLUSH) 0.9 %
3-10 SYRINGE (ML) INJECTION AS NEEDED
Status: DISCONTINUED | OUTPATIENT
Start: 2018-12-28 | End: 2018-12-29 | Stop reason: HOSPADM

## 2018-12-28 RX ORDER — CALCIUM CARBONATE 200(500)MG
2 TABLET,CHEWABLE ORAL 2 TIMES DAILY PRN
Status: DISCONTINUED | OUTPATIENT
Start: 2018-12-28 | End: 2018-12-29 | Stop reason: HOSPADM

## 2018-12-28 RX ORDER — METOPROLOL SUCCINATE 50 MG/1
50 TABLET, EXTENDED RELEASE ORAL 2 TIMES DAILY
Status: DISCONTINUED | OUTPATIENT
Start: 2018-12-28 | End: 2018-12-29 | Stop reason: HOSPADM

## 2018-12-28 RX ORDER — MULTIVITAMIN,THERAPEUTIC
1 TABLET ORAL DAILY
Status: DISCONTINUED | OUTPATIENT
Start: 2018-12-29 | End: 2018-12-29 | Stop reason: HOSPADM

## 2018-12-28 RX ORDER — HYDRALAZINE HYDROCHLORIDE 10 MG/1
10 TABLET, FILM COATED ORAL EVERY 8 HOURS PRN
Status: DISCONTINUED | OUTPATIENT
Start: 2018-12-28 | End: 2018-12-29 | Stop reason: HOSPADM

## 2018-12-28 RX ORDER — SODIUM CHLORIDE 9 MG/ML
75 INJECTION, SOLUTION INTRAVENOUS CONTINUOUS
Status: DISCONTINUED | OUTPATIENT
Start: 2018-12-28 | End: 2018-12-29 | Stop reason: HOSPADM

## 2018-12-28 RX ORDER — PANTOPRAZOLE SODIUM 40 MG/1
40 TABLET, DELAYED RELEASE ORAL DAILY
Status: DISCONTINUED | OUTPATIENT
Start: 2018-12-29 | End: 2018-12-29 | Stop reason: HOSPADM

## 2018-12-28 RX ORDER — SPIRONOLACTONE 25 MG/1
25 TABLET ORAL DAILY
Status: DISCONTINUED | OUTPATIENT
Start: 2018-12-29 | End: 2018-12-29 | Stop reason: HOSPADM

## 2018-12-28 RX ORDER — SODIUM CHLORIDE 0.9 % (FLUSH) 0.9 %
3 SYRINGE (ML) INJECTION EVERY 12 HOURS SCHEDULED
Status: DISCONTINUED | OUTPATIENT
Start: 2018-12-28 | End: 2018-12-29 | Stop reason: HOSPADM

## 2018-12-28 RX ORDER — ISOSORBIDE MONONITRATE 60 MG/1
60 TABLET, EXTENDED RELEASE ORAL DAILY
Status: DISCONTINUED | OUTPATIENT
Start: 2018-12-29 | End: 2018-12-29 | Stop reason: HOSPADM

## 2018-12-28 RX ORDER — IPRATROPIUM BROMIDE AND ALBUTEROL SULFATE 2.5; .5 MG/3ML; MG/3ML
3 SOLUTION RESPIRATORY (INHALATION)
Status: DISCONTINUED | OUTPATIENT
Start: 2018-12-28 | End: 2018-12-29 | Stop reason: HOSPADM

## 2018-12-28 RX ADMIN — ENOXAPARIN SODIUM 110 MG: 120 INJECTION SUBCUTANEOUS at 19:18

## 2018-12-28 RX ADMIN — SODIUM CHLORIDE 75 ML/HR: 900 INJECTION, SOLUTION INTRAVENOUS at 17:31

## 2018-12-28 RX ADMIN — IPRATROPIUM BROMIDE AND ALBUTEROL SULFATE 3 ML: 2.5; .5 SOLUTION RESPIRATORY (INHALATION) at 18:01

## 2018-12-28 RX ADMIN — IOPAMIDOL 100 ML: 755 INJECTION, SOLUTION INTRAVENOUS at 21:06

## 2018-12-28 RX ADMIN — IOPAMIDOL 52 ML: 755 INJECTION, SOLUTION INTRAVENOUS at 19:12

## 2018-12-28 RX ADMIN — METOPROLOL SUCCINATE 50 MG: 50 TABLET, EXTENDED RELEASE ORAL at 21:15

## 2018-12-28 RX ADMIN — SODIUM CHLORIDE, PRESERVATIVE FREE 3 ML: 5 INJECTION INTRAVENOUS at 21:15

## 2018-12-29 ENCOUNTER — APPOINTMENT (OUTPATIENT)
Dept: CARDIOLOGY | Facility: HOSPITAL | Age: 73
End: 2018-12-29
Attending: STUDENT IN AN ORGANIZED HEALTH CARE EDUCATION/TRAINING PROGRAM

## 2018-12-29 VITALS
OXYGEN SATURATION: 93 % | BODY MASS INDEX: 37.13 KG/M2 | TEMPERATURE: 96.6 F | DIASTOLIC BLOOD PRESSURE: 65 MMHG | HEIGHT: 68 IN | RESPIRATION RATE: 18 BRPM | WEIGHT: 245 LBS | SYSTOLIC BLOOD PRESSURE: 138 MMHG | HEART RATE: 74 BPM

## 2018-12-29 PROBLEM — D72.829 LEUKOCYTOSIS: Status: RESOLVED | Noted: 2018-12-28 | Resolved: 2018-12-29

## 2018-12-29 LAB
ANION GAP SERPL CALCULATED.3IONS-SCNC: 7 MMOL/L (ref 5–15)
BASOPHILS # BLD AUTO: 0.01 10*3/MM3 (ref 0–0.2)
BASOPHILS NFR BLD AUTO: 0.1 % (ref 0–2)
BH CV ECHO MEAS - ACS: 2.1 CM
BH CV ECHO MEAS - AO MAX PG (FULL): -0.32 MMHG
BH CV ECHO MEAS - AO MAX PG: 7.1 MMHG
BH CV ECHO MEAS - AO MEAN PG (FULL): 1 MMHG
BH CV ECHO MEAS - AO MEAN PG: 4 MMHG
BH CV ECHO MEAS - AO ROOT AREA (BSA CORRECTED): 1.4
BH CV ECHO MEAS - AO ROOT AREA: 8 CM^2
BH CV ECHO MEAS - AO ROOT DIAM: 3.2 CM
BH CV ECHO MEAS - AO V2 MAX: 133 CM/SEC
BH CV ECHO MEAS - AO V2 MEAN: 94.9 CM/SEC
BH CV ECHO MEAS - AO V2 VTI: 28 CM
BH CV ECHO MEAS - ASC AORTA: 3.3 CM
BH CV ECHO MEAS - AVA(I,A): 3.9 CM^2
BH CV ECHO MEAS - AVA(I,D): 3.9 CM^2
BH CV ECHO MEAS - AVA(V,A): 4.2 CM^2
BH CV ECHO MEAS - AVA(V,D): 4.2 CM^2
BH CV ECHO MEAS - BSA(HAYCOCK): 2.4 M^2
BH CV ECHO MEAS - BSA: 2.2 M^2
BH CV ECHO MEAS - BZI_BMI: 37.3 KILOGRAMS/M^2
BH CV ECHO MEAS - BZI_METRIC_HEIGHT: 172.7 CM
BH CV ECHO MEAS - BZI_METRIC_WEIGHT: 111.1 KG
BH CV ECHO MEAS - EDV(CUBED): 73.6 ML
BH CV ECHO MEAS - EDV(TEICH): 78.1 ML
BH CV ECHO MEAS - EF(CUBED): 81.2 %
BH CV ECHO MEAS - EF(TEICH): 74.2 %
BH CV ECHO MEAS - ESV(CUBED): 13.8 ML
BH CV ECHO MEAS - ESV(TEICH): 20.2 ML
BH CV ECHO MEAS - FS: 42.7 %
BH CV ECHO MEAS - IVS/LVPW: 1
BH CV ECHO MEAS - IVSD: 1.2 CM
BH CV ECHO MEAS - LA DIMENSION: 3.6 CM
BH CV ECHO MEAS - LA/AO: 1.1
BH CV ECHO MEAS - LV MASS(C)D: 164.7 GRAMS
BH CV ECHO MEAS - LV MASS(C)DI: 73.9 GRAMS/M^2
BH CV ECHO MEAS - LV MAX PG: 7.4 MMHG
BH CV ECHO MEAS - LV MEAN PG: 3 MMHG
BH CV ECHO MEAS - LV V1 MAX: 136 CM/SEC
BH CV ECHO MEAS - LV V1 MEAN: 83.3 CM/SEC
BH CV ECHO MEAS - LV V1 VTI: 26.1 CM
BH CV ECHO MEAS - LVIDD: 4.2 CM
BH CV ECHO MEAS - LVIDS: 2.4 CM
BH CV ECHO MEAS - LVOT AREA (M): 4.2 CM^2
BH CV ECHO MEAS - LVOT AREA: 4.2 CM^2
BH CV ECHO MEAS - LVOT DIAM: 2.3 CM
BH CV ECHO MEAS - LVPWD: 1.1 CM
BH CV ECHO MEAS - MR MAX PG: 55.4 MMHG
BH CV ECHO MEAS - MR MAX VEL: 372 CM/SEC
BH CV ECHO MEAS - MV A MAX VEL: 85.4 CM/SEC
BH CV ECHO MEAS - MV DEC SLOPE: 540 CM/SEC^2
BH CV ECHO MEAS - MV E MAX VEL: 118 CM/SEC
BH CV ECHO MEAS - MV E/A: 1.4
BH CV ECHO MEAS - MV P1/2T MAX VEL: 123 CM/SEC
BH CV ECHO MEAS - MV P1/2T: 66.7 MSEC
BH CV ECHO MEAS - MVA P1/2T LCG: 1.8 CM^2
BH CV ECHO MEAS - MVA(P1/2T): 3.3 CM^2
BH CV ECHO MEAS - PA MAX PG: 4.9 MMHG
BH CV ECHO MEAS - PA V2 MAX: 111 CM/SEC
BH CV ECHO MEAS - PULM A REVS DUR: 0.14 SEC
BH CV ECHO MEAS - PULM A REVS VEL: 30.5 CM/SEC
BH CV ECHO MEAS - PULM DIAS VEL: 47.5 CM/SEC
BH CV ECHO MEAS - PULM S/D: 1.5
BH CV ECHO MEAS - PULM SYS VEL: 71.9 CM/SEC
BH CV ECHO MEAS - RAP SYSTOLE: 5 MMHG
BH CV ECHO MEAS - RVDD: 3.4 CM
BH CV ECHO MEAS - RVSP: 23 MMHG
BH CV ECHO MEAS - SI(AO): 101.1 ML/M^2
BH CV ECHO MEAS - SI(CUBED): 26.8 ML/M^2
BH CV ECHO MEAS - SI(LVOT): 48.7 ML/M^2
BH CV ECHO MEAS - SI(TEICH): 26 ML/M^2
BH CV ECHO MEAS - SV(AO): 225.2 ML
BH CV ECHO MEAS - SV(CUBED): 59.7 ML
BH CV ECHO MEAS - SV(LVOT): 108.4 ML
BH CV ECHO MEAS - SV(TEICH): 58 ML
BH CV ECHO MEAS - TAPSE (>1.6): 2.2 CM2
BH CV ECHO MEAS - TR MAX VEL: 212 CM/SEC
BUN BLD-MCNC: 26 MG/DL (ref 7–21)
BUN/CREAT SERPL: 18.8 (ref 7–25)
CALCIUM SPEC-SCNC: 8.6 MG/DL (ref 8.4–10.2)
CHLORIDE SERPL-SCNC: 99 MMOL/L (ref 95–110)
CO2 SERPL-SCNC: 31 MMOL/L (ref 22–31)
CREAT BLD-MCNC: 1.38 MG/DL (ref 0.7–1.3)
DEPRECATED RDW RBC AUTO: 51.2 FL (ref 35.1–43.9)
EOSINOPHIL # BLD AUTO: 0.38 10*3/MM3 (ref 0–0.7)
EOSINOPHIL NFR BLD AUTO: 4.3 % (ref 0–7)
ERYTHROCYTE [DISTWIDTH] IN BLOOD BY AUTOMATED COUNT: 14.2 % (ref 11.5–14.5)
FERRITIN SERPL-MCNC: 154 NG/ML (ref 17.9–464)
FOLATE SERPL-MCNC: 14.7 NG/ML (ref 2.76–21)
GFR SERPL CREATININE-BSD FRML MDRD: 51 ML/MIN/1.73 (ref 42–98)
GLUCOSE BLD-MCNC: 119 MG/DL (ref 60–100)
HCT VFR BLD AUTO: 35.7 % (ref 39–49)
HGB BLD-MCNC: 12.2 G/DL (ref 13.7–17.3)
IMM GRANULOCYTES # BLD AUTO: 0.03 10*3/MM3 (ref 0–0.02)
IMM GRANULOCYTES NFR BLD AUTO: 0.3 % (ref 0–0.5)
IRON 24H UR-MRATE: 22 MCG/DL (ref 49–181)
IRON SATN MFR SERPL: 8 % (ref 20–55)
LYMPHOCYTES # BLD AUTO: 2.79 10*3/MM3 (ref 0.6–4.2)
LYMPHOCYTES NFR BLD AUTO: 31.8 % (ref 10–50)
MCH RBC QN AUTO: 33.7 PG (ref 26.5–34)
MCHC RBC AUTO-ENTMCNC: 34.2 G/DL (ref 31.5–36.3)
MCV RBC AUTO: 98.6 FL (ref 80–98)
MONOCYTES # BLD AUTO: 0.79 10*3/MM3 (ref 0–0.9)
MONOCYTES NFR BLD AUTO: 9 % (ref 0–12)
NEUTROPHILS # BLD AUTO: 4.77 10*3/MM3 (ref 2–8.6)
NEUTROPHILS NFR BLD AUTO: 54.5 % (ref 37–80)
PLATELET # BLD AUTO: 168 10*3/MM3 (ref 150–450)
PMV BLD AUTO: 9.1 FL (ref 8–12)
POTASSIUM BLD-SCNC: 4.3 MMOL/L (ref 3.5–5.1)
RBC # BLD AUTO: 3.62 10*6/MM3 (ref 4.37–5.74)
SODIUM BLD-SCNC: 137 MMOL/L (ref 137–145)
TIBC SERPL-MCNC: 285 MCG/DL (ref 261–462)
URATE SERPL-MCNC: 5.3 MG/DL (ref 2.5–8.5)
VIT B12 BLD-MCNC: 311 PG/ML (ref 239–931)
WBC NRBC COR # BLD: 8.77 10*3/MM3 (ref 3.2–9.8)

## 2018-12-29 PROCEDURE — G8979 MOBILITY GOAL STATUS: HCPCS

## 2018-12-29 PROCEDURE — 93306 TTE W/DOPPLER COMPLETE: CPT | Performed by: INTERNAL MEDICINE

## 2018-12-29 PROCEDURE — 84550 ASSAY OF BLOOD/URIC ACID: CPT | Performed by: STUDENT IN AN ORGANIZED HEALTH CARE EDUCATION/TRAINING PROGRAM

## 2018-12-29 PROCEDURE — 82746 ASSAY OF FOLIC ACID SERUM: CPT | Performed by: STUDENT IN AN ORGANIZED HEALTH CARE EDUCATION/TRAINING PROGRAM

## 2018-12-29 PROCEDURE — 25010000002 CYANOCOBALAMIN PER 1000 MCG: Performed by: FAMILY MEDICINE

## 2018-12-29 PROCEDURE — 97161 PT EVAL LOW COMPLEX 20 MIN: CPT

## 2018-12-29 PROCEDURE — 82607 VITAMIN B-12: CPT | Performed by: STUDENT IN AN ORGANIZED HEALTH CARE EDUCATION/TRAINING PROGRAM

## 2018-12-29 PROCEDURE — G8980 MOBILITY D/C STATUS: HCPCS

## 2018-12-29 PROCEDURE — 83540 ASSAY OF IRON: CPT | Performed by: STUDENT IN AN ORGANIZED HEALTH CARE EDUCATION/TRAINING PROGRAM

## 2018-12-29 PROCEDURE — G8978 MOBILITY CURRENT STATUS: HCPCS

## 2018-12-29 PROCEDURE — 94760 N-INVAS EAR/PLS OXIMETRY 1: CPT

## 2018-12-29 PROCEDURE — 99223 1ST HOSP IP/OBS HIGH 75: CPT | Performed by: STUDENT IN AN ORGANIZED HEALTH CARE EDUCATION/TRAINING PROGRAM

## 2018-12-29 PROCEDURE — 82728 ASSAY OF FERRITIN: CPT | Performed by: STUDENT IN AN ORGANIZED HEALTH CARE EDUCATION/TRAINING PROGRAM

## 2018-12-29 PROCEDURE — 94799 UNLISTED PULMONARY SVC/PX: CPT

## 2018-12-29 PROCEDURE — 85025 COMPLETE CBC W/AUTO DIFF WBC: CPT | Performed by: STUDENT IN AN ORGANIZED HEALTH CARE EDUCATION/TRAINING PROGRAM

## 2018-12-29 PROCEDURE — 83550 IRON BINDING TEST: CPT | Performed by: STUDENT IN AN ORGANIZED HEALTH CARE EDUCATION/TRAINING PROGRAM

## 2018-12-29 PROCEDURE — 93306 TTE W/DOPPLER COMPLETE: CPT

## 2018-12-29 PROCEDURE — 80048 BASIC METABOLIC PNL TOTAL CA: CPT | Performed by: STUDENT IN AN ORGANIZED HEALTH CARE EDUCATION/TRAINING PROGRAM

## 2018-12-29 RX ORDER — MAGNESIUM SULFATE 1 G/100ML
1 INJECTION INTRAVENOUS ONCE
Status: DISCONTINUED | OUTPATIENT
Start: 2018-12-29 | End: 2018-12-29

## 2018-12-29 RX ORDER — CYANOCOBALAMIN 1000 UG/ML
1000 INJECTION, SOLUTION INTRAMUSCULAR; SUBCUTANEOUS
Status: DISCONTINUED | OUTPATIENT
Start: 2018-12-29 | End: 2018-12-29 | Stop reason: HOSPADM

## 2018-12-29 RX ORDER — CHOLECALCIFEROL (VITAMIN D3) 25 MCG
1 TABLET,CHEWABLE ORAL DAILY
Qty: 30 CAPSULE | Refills: 0 | Status: SHIPPED | OUTPATIENT
Start: 2018-12-29 | End: 2022-01-03 | Stop reason: DRUGHIGH

## 2018-12-29 RX ADMIN — APIXABAN 10 MG: 5 TABLET, FILM COATED ORAL at 08:34

## 2018-12-29 RX ADMIN — SODIUM CHLORIDE 75 ML/HR: 900 INJECTION, SOLUTION INTRAVENOUS at 05:23

## 2018-12-29 RX ADMIN — FINASTERIDE 5 MG: 5 TABLET, FILM COATED ORAL at 08:35

## 2018-12-29 RX ADMIN — THERA TABS 1 TABLET: TAB at 08:34

## 2018-12-29 RX ADMIN — Medication 400 MG: at 14:23

## 2018-12-29 RX ADMIN — SODIUM CHLORIDE, PRESERVATIVE FREE 3 ML: 5 INJECTION INTRAVENOUS at 08:38

## 2018-12-29 RX ADMIN — ATORVASTATIN CALCIUM 10 MG: 10 TABLET, FILM COATED ORAL at 08:34

## 2018-12-29 RX ADMIN — ISOSORBIDE MONONITRATE 60 MG: 60 TABLET, EXTENDED RELEASE ORAL at 08:35

## 2018-12-29 RX ADMIN — IPRATROPIUM BROMIDE AND ALBUTEROL SULFATE 3 ML: 2.5; .5 SOLUTION RESPIRATORY (INHALATION) at 15:01

## 2018-12-29 RX ADMIN — ALLOPURINOL 300 MG: 300 TABLET ORAL at 08:34

## 2018-12-29 RX ADMIN — IPRATROPIUM BROMIDE AND ALBUTEROL SULFATE 3 ML: 2.5; .5 SOLUTION RESPIRATORY (INHALATION) at 07:27

## 2018-12-29 RX ADMIN — FERROUS SULFATE TAB EC 324 MG (65 MG FE EQUIVALENT) 324 MG: 324 (65 FE) TABLET DELAYED RESPONSE at 08:34

## 2018-12-29 RX ADMIN — POLYETHYLENE GLYCOL 3350 17 G: 17 POWDER, FOR SOLUTION ORAL at 08:34

## 2018-12-29 RX ADMIN — SPIRONOLACTONE 25 MG: 25 TABLET ORAL at 08:35

## 2018-12-29 RX ADMIN — METOPROLOL SUCCINATE 50 MG: 50 TABLET, EXTENDED RELEASE ORAL at 08:35

## 2018-12-29 RX ADMIN — CYANOCOBALAMIN 1000 MCG: 1000 INJECTION, SOLUTION INTRAMUSCULAR at 14:22

## 2018-12-29 RX ADMIN — FUROSEMIDE 20 MG: 20 TABLET ORAL at 08:35

## 2018-12-29 RX ADMIN — ACETAMINOPHEN 650 MG: 325 TABLET ORAL at 04:13

## 2018-12-29 RX ADMIN — PANTOPRAZOLE SODIUM 40 MG: 40 TABLET, DELAYED RELEASE ORAL at 08:35

## 2018-12-30 ENCOUNTER — READMISSION MANAGEMENT (OUTPATIENT)
Dept: CALL CENTER | Facility: HOSPITAL | Age: 73
End: 2018-12-30

## 2018-12-30 NOTE — OUTREACH NOTE
Prep Survey      Responses   Facility patient discharged from?  [facility]   Is patient eligible?  Yes   Discharge diagnosis  PE   Does the patient have one of the following disease processes/diagnoses(primary or secondary)?  Other   Does the patient have Home health ordered?  No   Is there a DME ordered?  No   Comments regarding appointments  call Monday for apmt - see AVS   Prep survey completed?  Yes          Soheila Wray RN

## 2019-01-02 ENCOUNTER — READMISSION MANAGEMENT (OUTPATIENT)
Dept: CALL CENTER | Facility: HOSPITAL | Age: 74
End: 2019-01-02

## 2019-01-02 NOTE — OUTREACH NOTE
Medical Week 1 Survey      Responses   Facility patient discharged from?  [facility]   Does the patient have one of the following disease processes/diagnoses(primary or secondary)?  Other   Is there a successful TCM telephone encounter documented?  No   Week 1 attempt successful?  No   Unsuccessful attempts  Attempt 1          Heather Shah RN

## 2019-01-03 ENCOUNTER — OFFICE VISIT (OUTPATIENT)
Dept: FAMILY MEDICINE CLINIC | Facility: CLINIC | Age: 74
End: 2019-01-03

## 2019-01-03 VITALS
BODY MASS INDEX: 37.44 KG/M2 | DIASTOLIC BLOOD PRESSURE: 64 MMHG | HEART RATE: 76 BPM | TEMPERATURE: 98.2 F | SYSTOLIC BLOOD PRESSURE: 134 MMHG | WEIGHT: 247 LBS | HEIGHT: 68 IN

## 2019-01-03 DIAGNOSIS — N18.30 CKD (CHRONIC KIDNEY DISEASE) STAGE 3, GFR 30-59 ML/MIN (HCC): Chronic | ICD-10-CM

## 2019-01-03 DIAGNOSIS — Z09 HOSPITAL DISCHARGE FOLLOW-UP: Primary | ICD-10-CM

## 2019-01-03 DIAGNOSIS — I82.412 ACUTE DEEP VEIN THROMBOSIS (DVT) OF FEMORAL VEIN OF LEFT LOWER EXTREMITY (HCC): ICD-10-CM

## 2019-01-03 DIAGNOSIS — I26.99 OTHER ACUTE PULMONARY EMBOLISM WITHOUT ACUTE COR PULMONALE (HCC): ICD-10-CM

## 2019-01-03 DIAGNOSIS — E66.01 MORBIDLY OBESE (HCC): Chronic | ICD-10-CM

## 2019-01-03 DIAGNOSIS — I50.30 (HFPEF) HEART FAILURE WITH PRESERVED EJECTION FRACTION (HCC): Chronic | ICD-10-CM

## 2019-01-03 PROBLEM — N40.0 BENIGN PROSTATIC HYPERPLASIA WITHOUT LOWER URINARY TRACT SYMPTOMS: Chronic | Status: ACTIVE | Noted: 2018-12-28

## 2019-01-03 PROCEDURE — 99214 OFFICE O/P EST MOD 30 MIN: CPT | Performed by: INTERNAL MEDICINE

## 2019-01-03 NOTE — PAYOR COMM NOTE
"Noe Barton Sr. (73 y.o. Male)     Date of Birth Social Security Number Address Home Phone MRN    1945  [address]  [facility] [address] [phone] [id number]    Shinto Marital Status          [place]        Admission Date Admission Type Admitting Provider Attending Provider Department, Room/Bed    12/28/18 Emergency Maicol Roca MD  [address], 427/1    Discharge Date Discharge Disposition Discharge Destination        12/29/2018 Home or Self Care              Attending Provider:  (none)   Allergies:  Fluconazole, Hydrochlorothiazide, Sulfa Antibiotics    Isolation:  None   Infection:  None   Code Status:  Prior    Ht:  172.7 cm (68\")   Wt:  111 kg (245 lb)    Admission Cmt:  None   Principal Problem:  Pulmonary embolus (CMS/HCC) [I26.99] More...                 Active Insurance as of 12/28/2018     Primary Coverage     Payor Plan Insurance Group Employer/Plan Group    HUMANA MEDICARE REPLACEMENT HUMANA MEDICARE REPL [id number]     Payor Plan Address Payor Plan Phone Number Payor Plan Fax Number Effective Dates    [address] [address] [phone]  1/1/2018 - None Entered    [facility] [id number]       Subscriber Name Subscriber Birth Date Member ID       NOE BARTON SR. 1945 [id number]                 Emergency Contacts      (Rel.) Home Phone Work Phone Mobile Phone    Arlin Barton (Spouse) [phone] -- [phone]        [place] [facility]  P:[phone]  F:[phone]    Ref#[id number]       History & Physical      Sid Zepeda MD at 12/28/2018  7:32 PM     Attestation signed by Maicol Roca MD at 12/29/2018  1:25 PM    I have performed a history and physical examination of the patient. I have discussed the management of the patient with the resident.  I have reviewed the notes, assessment and plan, and/or procedures  performed by the resident. I concur with the resident’s documentation.  "   I have noted the following changes from initial resident’s H and P:      74 yo WM presenting with Shortness of air.  Pt reports falling one week ago and developing leg pain a few days later. Pt now presenting with shortness of air to urgent care where doppler completed showed DVT. Pt was sent to ER for further workup. CTPE showing PE pt started on treatment dose lovenox.     General: NAD, Laying comfortably in bed  HEENT:NC/AT, Nares patent, no septal deviation  CV: RRR, No murmurs rubs or gallops  Resp: Lungs CTAB, equal breath sounds    -Case management consulted for arrangement for eliquis and coumadin clinic  -Echo showing no strain on heart  -Pt saturating well on room air  -PT consulted and no SNF needed for falls  -Replace iron, b12, and magnesium  -Pt counseled on the importance of activity to prevent further clots. Family reports pt is very sedentary    Dispo: Discharge home with close follow up will need repeat BMP as outpatient to ensure slight bump in creatinine from contrast resolves.      This document has been electronically signed by Maicol Roca MD on 2018 1:25 PM                              HISTORY AND PHYSICAL  NAME: Noe Barnes Sr.  : 1945  MRN: [id number]    DATE OF ADMISSION: 18    DATE & TIME SEEN: 18 7:33 PM    PCP: Elia Herron MD    CODE STATUS: Full code    CHIEF COMPLAINT Shortness of air    HPI:  Noe Barnes Sr. is a 73 y.o. male with a CMH of CKD stage 3, HFpEF EF 50-55%, hyperlipidemia, hypertension, who presents complaining of shortness of air. Patient reports falling one week ago in the shower has having persistent left shoulder pain. Three to four days later he began experiencing pain behind his left knee. Pain is 6/10, throbbing, localized to posterior knee, non-radiating, can flare up to a 10/10. No known aggravating factors. Alleviated by pain medication. Onset associated with shortness of air. Pain today was severe enough  to go to urgent care at [place] for evaluation. There they found he had a DVT and sent him to the hospital. Patient is a poor historian and is unaware of heart failure. Family report he lives a sedentary lifestyle and sits in a chair for over 6 hours a day. He reports chills. He denies recent travel, chest pain, diaphoresis, nausea, vomiting, diarrhea.    In the ED, significant labs included: .0, Troponin <0.012, D-dimer >4,000, INR 0.99. CTA chest with contrast showed right lower lobe segmental and subsegmental emboli. He received Lovenox at 1mg/kg.      CONCURRENT MEDICAL HISTORY:  Past Medical History:   Diagnosis Date   • (HFpEF) heart failure with preserved ejection fraction (CMS/[facility]) 12/28/2018   • Acute bronchitis    • Acute urinary tract infection    • Candidal balanitis    • Chronic kidney failure, stage 3 (moderate) (CMS/[facility])    • CKD (chronic kidney disease) stage 3, GFR 30-59 ml/min (CMS/[facility])    • Dysfunction of eustachian tube    • Essential hypertension    • Gastroesophageal reflux disease without esophagitis    • Hypertriglyceridemia    • Impaired glucose tolerance    • Iron deficiency anemia due to dietary causes    • Mixed hyperlipidemia    • Pruritic rash     Resolved   • Skin lesion of scalp    • Tick bite    • Upper respiratory infection        PAST SURGICAL HISTORY:  Past Surgical History:   Procedure Laterality Date   • CIRCUMCISION  12/2009    CIRCUMCISION W/REGIONL BLOCK 12164 (1)      • COLONOSCOPY  11/30/2016   • COLONOSCOPY N/A 12/18/2017    Procedure: COLONOSCOPY;  Surgeon: Ger Thomas MD;  Location: [facility] ENDOSCOPY;  Service:    • COLONOSCOPY N/A 11/26/2018    Procedure: COLONOSCOPY;  Surgeon: Ger Thomas MD;  Location: [facility] ENDOSCOPY;  Service: General   • INJECTION OF MEDICATION  01/15/2016    Depo Medrol (Methylprednisone) 80mg (1)         FAMILY HISTORY:  Family History   Problem Relation Age of Onset   • Diabetes Mother    • Heart disease Mother    •  Hypertension Mother    • Stomach cancer Father    • Hypertension Father    • Heart disease Brother    • Cancer Other         other        SOCIAL HISTORY:  Social History     Socioeconomic History   • Marital status:      Spouse name: Not on file   • Number of children: Not on file   • Years of education: Not on file   • Highest education level: Not on file   Social Needs   • Financial resource strain: Not on file   • Food insecurity - worry: Not on file   • Food insecurity - inability: Not on file   • Transportation needs - medical: Not on file   • Transportation needs - non-medical: Not on file   Occupational History   • Not on file   Tobacco Use   • Smoking status: Former Smoker     Packs/day: 1.50     Years: 60.00     Pack years: 90.00     Last attempt to quit: 05/2015     Years since quitting: 3.6   • Smokeless tobacco: Never Used   Substance and Sexual Activity   • Alcohol use: No   • Drug use: No   • Sexual activity: Defer   Other Topics Concern   • Not on file   Social History Narrative   • Not on file       HOME MEDICATIONS:  Prior to Admission medications    Medication Sig Start Date End Date Taking? Authorizing Provider   allopurinol (ZYLOPRIM) 300 MG tablet TAKE 1 TABLET BY MOUTH DAILY 10/22/18  Yes Elia Herron MD   ferrous sulfate 325 (65 FE) MG tablet Take 325 mg by mouth Daily With Breakfast.   Yes ProviderCathy MD   finasteride (PROSCAR) 5 MG tablet Take 1 tablet by mouth Daily. 11/21/17  Yes Cathy Tapia MD   fluticasone (FLONASE) 50 MCG/ACT nasal spray 2 sprays into the nostril(s) as directed by provider Daily As Needed for Rhinitis or Allergies.   Yes ProviderCathy MD   furosemide (LASIX) 20 MG tablet Take 1 tablet by mouth Daily. 9/17/18  Yes Elia Herron MD   hydrALAZINE (APRESOLINE) 50 MG tablet TAKE 1 TABLET BY MOUTH TWO TIMES A DAY 12/27/18  Yes Elia Herron MD   isosorbide mononitrate (IMDUR) 60 MG 24 hr tablet Take 60 mg by mouth Daily. 10/22/18   Yes Cathy Tapia MD   losartan (COZAAR) 100 MG tablet Take 1 tablet by mouth Daily. To replace Losartan with HCTZ 9/17/18  Yes Elia Herron MD   magnesium oxide (MAGOX) 400 (241.3 Mg) MG tablet tablet Take 1 tablet by mouth Daily. 9/17/18  Yes Elia Herron MD   metoprolol succinate XL (TOPROL-XL) 50 MG 24 hr tablet Take 50 mg by mouth 2 (Two) Times a Day. 11/15/18  Yes Cathy Tapia MD   nystatin (MYCOSTATIN) 694827 UNIT/GM ointment Apply  topically 3 (Three) Times a Day. 3/23/18  Yes Perla Clark APRN   pantoprazole (PROTONIX) 40 MG EC tablet Take 1 tablet by mouth Daily. 9/17/18  Yes Elia Herron MD   pravastatin (PRAVACHOL) 20 MG tablet Take 1 tablet by mouth Every Night. 9/17/18  Yes Elia Herron MD   spironolactone (ALDACTONE) 25 MG tablet Take 25 mg by mouth Daily. 6/22/18 6/18/19 Yes Howie Sandy MD   tolnaftate (TINACTIN) 1 % cream Apply 1 application topically 2 (Two) Times a Day.   Yes ProviderCathy MD       ALLERGIES:  Fluconazole; Hydrochlorothiazide; and Sulfa antibiotics    REVIEW OF SYSTEMS  Review of Systems   Constitutional: Positive for chills and fatigue. Negative for appetite change, diaphoresis and fever.   HENT: Positive for congestion, postnasal drip and rhinorrhea. Negative for ear pain, sore throat and trouble swallowing.    Eyes: Negative for pain and visual disturbance.   Respiratory: Positive for cough and shortness of breath. Negative for choking, chest tightness and wheezing.    Cardiovascular: Positive for leg swelling. Negative for chest pain and palpitations.   Gastrointestinal: Negative for abdominal pain, constipation, diarrhea, nausea and vomiting.   Endocrine: Negative for polydipsia, polyphagia and polyuria.   Genitourinary: Negative for difficulty urinating, dysuria, flank pain, frequency, hematuria and urgency.   Musculoskeletal: Negative for arthralgias, back pain and myalgias.   Skin: Negative for pallor and rash.   Neurological:  Positive for weakness (legs). Negative for dizziness, syncope, light-headedness, numbness and headaches.       PHYSICAL EXAM:  Temp:  [97.5 °F (36.4 °C)-97.8 °F (36.6 °C)] 97.5 °F (36.4 °C)  Heart Rate:  [77-90] 82  Resp:  [16-20] 18  BP: (136-169)/(71-81) 143/71  Body mass index is 37.25 kg/m².  Physical Exam   Constitutional: He is oriented to person, place, and time. He appears well-developed and well-nourished. No distress.   HENT:   Head: Normocephalic and atraumatic.   Right Ear: External ear normal.   Left Ear: External ear normal.   Nose: Nose normal.   Mouth/Throat: Oropharynx is clear and moist.   Eyes: Conjunctivae and EOM are normal. Pupils are equal, round, and reactive to light.   Neck: Normal range of motion. Neck supple. No thyromegaly present.   Cardiovascular: Normal rate, regular rhythm, normal heart sounds and intact distal pulses.   Pulmonary/Chest: Effort normal and breath sounds normal. He has no wheezes. He has no rales.   Abdominal: Soft. Bowel sounds are normal. There is no tenderness.   Musculoskeletal: Normal range of motion. He exhibits edema (Right leg trace to 1+ pitting edema up to knee. Left pitting edema 1+ to knee). He exhibits no tenderness.   Lymphadenopathy:     He has no cervical adenopathy.   Neurological: He is alert and oriented to person, place, and time.   Skin: Skin is warm and dry. Capillary refill takes less than 2 seconds. He is not diaphoretic.       DIAGNOSTIC DATA:   Lab Results (last 24 hours)     Procedure Component Value Units Date/Time    Troponin [[id number]]  (Normal) Collected:  12/28/18 1730    Specimen:  Blood Updated:  12/28/18 1956     Troponin I <0.012 ng/mL     BNP [[id number]]  (Normal) Collected:  12/28/18 1730    Specimen:  Blood Updated:  12/28/18 1956     proBNP 235.0 pg/mL     Extra Tubes [[id number]] Collected:  12/28/18 1730    Specimen:  Blood, Venous Line Updated:  12/28/18 1832    Narrative:       The following orders were created for panel  order Extra Tubes.  Procedure                               Abnormality         Status                     ---------                               -----------         ------                     Gold Top - SST[[id number]]                                   Final result                 Please view results for these tests on the individual orders.    Gold Top - SST [[id number]] Collected:  12/28/18 1730    Specimen:  Blood Updated:  12/28/18 1832     Extra Tube Hold for add-ons.     Comment: Auto resulted.       D-dimer, Quantitative [[id number]]  (Abnormal) Collected:  12/28/18 1730    Specimen:  Blood Updated:  12/28/18 1813     D-Dimer, Quantitative >4,000 ng/mL (FEU)     Narrative:       Dimer values <500 ng/ml FEU are FDA approved as aid in diagnosis of deep venous thrombosis and pulmonary embolism.  This test should not be used in an exclusion strategy with pretest probability alone.    A recent guideline regarding diagnosis for pulmonary thromboembolism recommends an adjusted exclusion criterion of age x 10 ng/ml FEU for patients >50 years of age ([employer] Intern Med 2015; 163: 701-711).    aPTT [[id number]]  (Normal) Collected:  12/28/18 1730    Specimen:  Blood Updated:  12/28/18 1804     PTT 24.8 seconds     Narrative:       The recommended Heparin therapeutic range is 68-97 seconds.    Protime-INR [[id number]]  (Normal) Collected:  12/28/18 1730    Specimen:  Blood Updated:  12/28/18 1804     Protime 12.9 Seconds      INR 0.99    Narrative:       Therapeutic range for most indications is 2.0-3.0 INR,  or 2.5-3.5 for mechanical heart valves.    Comprehensive Metabolic Panel [[id number]]  (Abnormal) Collected:  12/28/18 1730    Specimen:  Blood Updated:  12/28/18 1749     Glucose 107 mg/dL      BUN 26 mg/dL      Creatinine 1.31 mg/dL      Sodium 135 mmol/L      Potassium 4.5 mmol/L      Chloride 93 mmol/L      CO2 35.0 mmol/L      Calcium 9.3 mg/dL      Total Protein 7.2 g/dL      Albumin 4.10 g/dL      ALT (SGPT) 31 U/L       AST (SGOT) 48 U/L      Alkaline Phosphatase 86 U/L      Total Bilirubin 0.7 mg/dL      eGFR Non African Amer 54 mL/min/1.73      Globulin 3.1 gm/dL      A/G Ratio 1.3 g/dL      BUN/Creatinine Ratio 19.8     Anion Gap 7.0 mmol/L     Narrative:       The MDRD GFR formula is only valid for adults with stable renal function between ages 18 and 70.    CBC & Differential [[id number]] Collected:  12/28/18 1730    Specimen:  Blood Updated:  12/28/18 1739    Narrative:       The following orders were created for panel order CBC & Differential.  Procedure                               Abnormality         Status                     ---------                               -----------         ------                     CBC Auto Differential[[id number]]        Abnormal            Final result                 Please view results for these tests on the individual orders.    CBC Auto Differential [[id number]]  (Abnormal) Collected:  12/28/18 1730    Specimen:  Blood Updated:  12/28/18 1739     WBC 11.02 10*3/mm3      RBC 3.79 10*6/mm3      Hemoglobin 12.6 g/dL      Hematocrit 37.3 %      MCV 98.4 fL      MCH 33.2 pg      MCHC 33.8 g/dL      RDW 14.1 %      RDW-SD 50.3 fl      MPV 9.1 fL      Platelets 167 10*3/mm3      Neutrophil % 58.9 %      Lymphocyte % 29.9 %      Monocyte % 7.8 %      Eosinophil % 2.9 %      Basophil % 0.1 %      Immature Grans % 0.4 %      Neutrophils, Absolute 6.50 10*3/mm3      Lymphocytes, Absolute 3.29 10*3/mm3      Monocytes, Absolute 0.86 10*3/mm3      Eosinophils, Absolute 0.32 10*3/mm3      Basophils, Absolute 0.01 10*3/mm3      Immature Grans, Absolute 0.04 10*3/mm3            Imaging Results (last 24 hours)     Procedure Component Value Units Date/Time    CT Angiogram Chest With Contrast [[id number]] Collected:  12/28/18 1902     Updated:  12/28/18 1952    Narrative:         CT angiogram of the chest with contrast    History:  Elevated d-dimer    Axial spiral scans of the chest were obtained  with   intravenous  contrast.  Coronal and sagittal reconstructions and both oblique  coronal MIPS obtained the same workstation.    This exam was performed according to our departmental  dose-optimization program, which includes automated exposure  control, adjustment of the mA and/or kV according to patient size  and/or use of iterative reconstruction technique.    DLP: 436.50    Comparison:    Right lower lobe segmental and subsegmental emboli.  No mediastinal hematoma.  No hilar, mediastinal or axillary adenopathy.  No thoracic aortic aneurysm or dissection.  No pericardial effusion.    Linear atelectasis lung bases.  No mass or noncalcified nodule.  No pleural effusion.  No pneumothorax.    Small hiatal hernia.  4 cm right renal cyst.  Less than 1 cm hyperdense left renal cyst.    No acute osseous abnormality.  Degenerative changes in the thoracic spine.      Impression:       Conclusion:  Right lower lobe segmental and subsegmental emboli.    Report called at approximately 7:55 PM CST.    [id number]    Electronically signed by:  London Hurt MD  12/28/2018 7:51 PM  CST Workstation: [id number]    XR Chest 1 View [[id number]] Collected:  12/28/18 1721     Updated:  12/28/18 1741    Narrative:         PORTABLE CHEST    HISTORY: DVT    Portable AP upright film of the chest was obtained at 5:22 PM.  COMPARISON: February 18, 2018    The lungs are clear of an acute process.  Eventration right hemidiaphragm.  The heart is not enlarged.  The pulmonary vasculature is not increased.  No pleural effusion.  No pneumothorax.  No acute osseous abnormality.  Degenerative changes are present in the thoracic spine.      Impression:       CONCLUSION:  No Acute Disease    [address]    Electronically signed by:  London Hurt MD  12/28/2018 5:40 PM  CST Workstation: [id number]            I reviewed the patient's new clinical results.    ASSESSMENT AND PLAN: This is a 73 y.o. male with:    Active Hospital Problems    Diagnosis Date Noted   • **Pulmonary  embolus (CMS/[facility]) [I26.99] 12/28/2018     Priority: High     Will place patient on telemetry  ECHO in AM for right heart strain  Initiation NOAC in AM  Attempt enrollment in coumadin clinic for medication assistance     • DVT (deep vein thrombosis) in pregnancy (CMS/[facility]) [O22.30, I82.409] 12/28/2018     Priority: High     Continue anticoagulation as started in ED, transition to NOAC for three months     • (HFpEF) heart failure with preserved ejection fraction (CMS/[facility]) [I50.30] 12/28/2018     Priority: High     ECHO 7/5/18: EF 50-55%  AHA Stage C  NYHA Class II-III  Will continue home spirolactone but other renally active drugs will be held due to recent contrast use for CTA chest     • CKD (chronic kidney disease) stage 3, GFR 30-59 ml/min (CMS/[facility]) [N18.3] 12/28/2018     Priority: Medium     Creatinine currently at baseline  Avoid nephrotoxic agents     • Mixed hyperlipidemia [E78.2]      Priority: Medium     Continue statin  Lipid panel 10 months ago appropriate     • Leukocytosis [D72.829] 12/28/2018     Priority: Low     No left shift  Likely secondary to DVT  Monitor, VSS       • BPH (benign prostatic hyperplasia) [N40.0] 12/28/2018     Priority: Low     Continue proscar     • 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] 09/11/2017     Priority: Low     Continue allopurinol  Repeat uric acid  Monitor urine output >2 L/day     • Iron deficiency anemia due to dietary causes [D50.8]      Priority: Low     Continue daily Fe supplement  Repeat Fe studies  Folate and B12 as MCV elevated         DVT prophylaxis: Lovenox treatment dose for VTE and Eliquis tomorrow     SAE # [id number], reviewed and consistent with patient reported medications.    Expected Length of Stay: Where: home and When:  2-3 days    I discussed the patients findings and my recommendations with patient.     Dr. Roca is the attending on record at time of admission, She is aware of the patient's status and agrees with the  above history and physical.      Sid Zepeda M.D. PGY1  [facility]  [address] [place] [address]  Office: [phone]    This document has been electronically signed by Sid Zepeda MD on December 28, 2018 9:26 PM          Electronically signed by Maicol Roca MD at 12/29/2018  1:25 PM          Emergency Department Notes      Kailey Stephenson at 12/28/2018  5:25 PM        EKG was completed by Kailey Spear  12/28/18 0037      Electronically signed by Kailey Stephenson at 12/28/2018  5:27 PM     Dulce Jarquin, RN at 12/28/2018  5:50 PM        Respiratory therapy contacted about administering neb treatment.     Dulce Jarquin RN  12/28/18 3663      Electronically signed by Dulce Jarquin, RN at 12/28/2018  5:51 PM     King Saunders MD at 12/28/2018  7:22 PM          Subjective   72yo male presents ED c/o 10d hx LLE discomfort/edema s/p fall, seen outside clinic earlier today with duplex US LLE demonstrating left femoral/popliteal/anterior tibial vein DVT.  ROS (+) intermittent dyspnea.  Denies chest pain/fever/cough/hemoptysis/n/v/abd pain.        History provided by:  Patient and relative  Illness   Severity:  Moderate  Onset quality:  Gradual  Duration:  10 days  Timing:  Constant  Chronicity:  New  Associated symptoms: shortness of breath    Associated symptoms: no cough and no wheezing        Review of Systems   Constitutional: Negative.    HENT: Negative.    Respiratory: Positive for shortness of breath. Negative for cough and wheezing.    Cardiovascular: Negative.    Gastrointestinal: Negative.    Genitourinary: Negative.    Musculoskeletal: Negative.    Skin: Negative.    All other systems reviewed and are negative.      Past Medical History:   Diagnosis Date   • Acute bronchitis    • Acute urinary tract infection    • Candidal balanitis    • Chronic kidney failure, stage 3 (moderate) (CMS/HCC)    • Dysfunction  of eustachian tube    • Essential hypertension    • Gastroesophageal reflux disease without esophagitis    • Hypertriglyceridemia    • Impaired glucose tolerance    • Iron deficiency anemia due to dietary causes    • Mixed hyperlipidemia    • Pruritic rash     Resolved   • Skin lesion of scalp    • Tick bite    • Upper respiratory infection        Allergies   Allergen Reactions   • Fluconazole Rash   • Hydrochlorothiazide Rash   • Sulfa Antibiotics Itching     itching       Past Surgical History:   Procedure Laterality Date   • CIRCUMCISION  12/2009    CIRCUMCISION W/REGIONL BLOCK 36598 (1)      • COLONOSCOPY  11/30/2016   • COLONOSCOPY N/A 12/18/2017    Procedure: COLONOSCOPY;  Surgeon: Ger Thomas MD;  Location: [facility] ENDOSCOPY;  Service:    • COLONOSCOPY N/A 11/26/2018    Procedure: COLONOSCOPY;  Surgeon: Ger Thomas MD;  Location: [facility] ENDOSCOPY;  Service: General   • INJECTION OF MEDICATION  01/15/2016    Depo Medrol (Methylprednisone) 80mg (1)         Family History   Problem Relation Age of Onset   • Diabetes Mother    • Heart disease Mother    • Stomach cancer Father    • Heart disease Brother    • Cancer Other         other       Social History     Socioeconomic History   • Marital status:      Spouse name: Not on file   • Number of children: Not on file   • Years of education: Not on file   • Highest education level: Not on file   Tobacco Use   • Smoking status: Former Smoker     Last attempt to quit: 05/2015     Years since quitting: 3.6   • Smokeless tobacco: Never Used   Substance and Sexual Activity   • Alcohol use: No   • Drug use: No   • Sexual activity: Defer           Objective   Physical Exam   Constitutional: He is oriented to person, place, and time. He appears well-developed and well-nourished.   HENT:   Head: Normocephalic and atraumatic.   Mouth/Throat: Oropharynx is clear and moist.   Eyes: Pupils are equal, round, and reactive to light.   Neck: Neck supple. No JVD  present. No tracheal deviation present.   Cardiovascular: Normal rate, regular rhythm, normal heart sounds and intact distal pulses. Exam reveals no gallop and no friction rub.   No murmur heard.  Pulmonary/Chest: Effort normal and breath sounds normal. He has no wheezes. He has no rales.   Abdominal: Soft. Bowel sounds are normal. He exhibits no mass. There is no tenderness. There is no rebound and no guarding.   Musculoskeletal: He exhibits edema.        Left lower leg: He exhibits swelling and edema. He exhibits no tenderness, no bony tenderness, no deformity and no laceration.   Lymphadenopathy:     He has no cervical adenopathy.   Neurological: He is alert and oriented to person, place, and time.   Skin: Skin is warm and dry.   Nursing note and vitals reviewed.      Procedures          ED Course  ED Course as of Dec 28 1922   Fri Dec 28, 2018   1918 D/w Dr. Bhandari admitting.  [SD]      ED Course User Index  [SD] King Saunders MD      Labs Reviewed   COMPREHENSIVE METABOLIC PANEL - Abnormal; Notable for the following components:       Result Value    Glucose 107 (*)     BUN 26 (*)     Creatinine 1.31 (*)     Sodium 135 (*)     Chloride 93 (*)     CO2 35.0 (*)     All other components within normal limits    Narrative:     The MDRD GFR formula is only valid for adults with stable renal function between ages 18 and 70.   D-DIMER, QUANTITATIVE - Abnormal; Notable for the following components:    D-Dimer, Quantitative >4,000 (*)     All other components within normal limits    Narrative:     Dimer values <500 ng/ml FEU are FDA approved as aid in diagnosis of deep venous thrombosis and pulmonary embolism.  This test should not be used in an exclusion strategy with pretest probability alone.    A recent guideline regarding diagnosis for pulmonary thromboembolism recommends an adjusted exclusion criterion of age x 10 ng/ml FEU for patients >50 years of age ([employer] Med 2015; 163: 701-711).   CBC WITH AUTO  DIFFERENTIAL - Abnormal; Notable for the following components:    WBC 11.02 (*)     RBC 3.79 (*)     Hemoglobin 12.6 (*)     Hematocrit 37.3 (*)     MCV 98.4 (*)     RDW-SD 50.3 (*)     Immature Grans, Absolute 0.04 (*)     All other components within normal limits   PROTIME-INR - Normal    Narrative:     Therapeutic range for most indications is 2.0-3.0 INR,  or 2.5-3.5 for mechanical heart valves.   APTT - Normal    Narrative:     The recommended Heparin therapeutic range is 68-97 seconds.   CBC AND DIFFERENTIAL    Narrative:     The following orders were created for panel order CBC & Differential.  Procedure                               Abnormality         Status                     ---------                               -----------         ------                     CBC Auto Differential[[id number]]        Abnormal            Final result                 Please view results for these tests on the individual orders.   EXTRA TUBES    Narrative:     The following orders were created for panel order Extra Tubes.  Procedure                               Abnormality         Status                     ---------                               -----------         ------                     Gold Top - SST[[id number]]                                   Final result                 Please view results for these tests on the individual orders.   GOLD TOP - SST     Xr Chest 1 View    Result Date: 12/28/2018  Narrative: PORTABLE CHEST HISTORY: DVT Portable AP upright film of the chest was obtained at 5:22 PM. COMPARISON: February 18, 2018 The lungs are clear of an acute process. Eventration right hemidiaphragm. The heart is not enlarged. The pulmonary vasculature is not increased. No pleural effusion. No pneumothorax. No acute osseous abnormality. Degenerative changes are present in the thoracic spine.     Impression: CONCLUSION: No Acute Disease [id number] Electronically signed by:  London Hurt MD  12/28/2018 5:40 PM CST  Workstation: [id number]    Us Venous Doppler Lower Extremity Left (duplex)    Result Date: 12/28/2018  Narrative: Procedure: Left lower extremity venous ultrasound CLINICAL INDICATION: left leg pain/edema, R60.0 Localized edema COMPARISON: None FINDINGS: The left common femoral vein appears easily compressible with normal dopplerable blood flow. The left greater saphenous vein appears widely patent easily compressible normal dopplerable blood flow. The left deep profunda vein appears widely patent easily compressible normal dopplerable blood flow. Proximal left femoral vein deep venous thrombosis is seen which is nearly occlusive with trickle flow demonstrated. Mid left femoral vein deep venous thrombosis which is nearly completely occlusive with trickle flow demonstrated. Distal left femoral vein deep venous thrombosis which is nearly completely occlusive with trickle flow demonstrated. Left popliteal vein deep venous thrombosis which is nearly completely occlusive with trickle flow demonstrated. Extension of deep venous thrombosis into the proximal left anterior tibial vein with associated trickle flow in this region. The left posterior tibial vein and peroneal vein appear normal with normal dopplerable blood flow.     Impression: CONCLUSION: 1.  Extensive left femoral vein, left popliteal vein, and proximal left calf anterior tibial vein deep venous thrombosis as described above. 2.  Remainder of left lower extremity venous ultrasound unremarkable. 3.  Ultrasound technician notified referring institution of findings by phone following exam. Electronically signed by:  Elai Arreola MD  12/28/2018 12:21 PM CST Workstation: [id number]              [facility]      Final diagnoses:   Other acute pulmonary embolism without acute cor pulmonale (CMS/HCC)   Acute deep vein thrombosis (DVT) of proximal vein of left lower extremity (CMS/HCC)            King Saunders MD  12/28/18 1924      Electronically signed by King Saunders MD at  "12/28/2018  7:24 PM       Hospital Medications (all)       Dose Frequency Start End    enoxaparin (LOVENOX) syringe 110 mg 1 mg/kg × 111 kg Once 12/28/2018 12/28/2018    Sig - Route: Inject 0.73 mL under the skin into the appropriate area as directed 1 (One) Time. - Subcutaneous    iopamidol (ISOVUE-370) 76 % injection 100 mL 100 mL Once in Imaging 12/28/2018 12/28/2018    Sig - Route: Infuse 100 mL into a venous catheter Once. - Intravenous    acetaminophen (TYLENOL) tablet 650 mg (Discontinued) 650 mg Every 4 Hours PRN 12/28/2018 12/29/2018    Sig - Route: Take 2 tablets by mouth Every 4 (Four) Hours As Needed for Mild Pain , Headache or Fever. - Oral    Reason for Discontinue: Patient Discharge    Cosign for Ordering: Accepted by Mirella Bhandari MD on 12/31/2018  7:10 AM    allopurinol (ZYLOPRIM) tablet 300 mg (Discontinued) 300 mg Daily 12/29/2018 12/29/2018    Sig - Route: Take 1 tablet by mouth Daily. - Oral    Reason for Discontinue: Patient Discharge    Cosign for Ordering: Accepted by Mirella Bhandari MD on 12/31/2018  7:10 AM    apixaban (ELIQUIS) tablet 10 mg (Discontinued) 10 mg Every 12 Hours Scheduled 12/29/2018 12/29/2018    Sig - Route: Take 2 tablets by mouth Every 12 (Twelve) Hours. - Oral    Reason for Discontinue: Patient Discharge    Cosign for Ordering: Accepted by Mirella Bhandari MD on 12/31/2018  7:10 AM    Linked Group 1:  \"Followed by\" Linked Group Details        apixaban (ELIQUIS) tablet 5 mg (Discontinued) 5 mg Every 12 Hours Scheduled 1/5/2019 12/29/2018    Sig - Route: Take 1 tablet by mouth Every 12 (Twelve) Hours. - Oral    Reason for Discontinue: Patient Discharge    Cosign for Ordering: Accepted by Mirella Bhandari MD on 12/31/2018  7:10 AM    Linked Group 1:  \"Followed by\" Linked Group Details        atorvastatin (LIPITOR) tablet 10 mg (Discontinued) 10 mg Daily 12/29/2018 12/29/2018    Sig - Route: Take 1 tablet by mouth Daily. - Oral "    Reason for Discontinue: Patient Discharge    Cosign for Ordering: Accepted by Mirella Bhandari MD on 12/31/2018  7:10 AM    calcium carbonate (TUMS) chewable tablet 500 mg (200 mg elemental) (Discontinued) 2 tablet 2 Times Daily PRN 12/28/2018 12/29/2018    Sig - Route: Chew 1,000 mg 2 (Two) Times a Day As Needed for Heartburn. - Oral    Reason for Discontinue: Patient Discharge    Cosign for Ordering: Accepted by Mirella Bhandari MD on 12/31/2018  7:10 AM    cyanocobalamin injection 1,000 mcg (Discontinued) 1,000 mcg Every 28 Days 12/29/2018 12/29/2018    Sig - Route: Inject 1 mL into the appropriate muscle as directed by prescriber Every 28 (Twenty-Eight) Days. - Intramuscular    Reason for Discontinue: Patient Discharge    ferrous sulfate EC tablet 324 mg (Discontinued) 324 mg Daily With Breakfast 12/29/2018 12/29/2018    Sig - Route: Take 1 tablet by mouth Daily With Breakfast. - Oral    Reason for Discontinue: Patient Discharge    Cosign for Ordering: Accepted by Mirella Bhandari MD on 12/31/2018  7:10 AM    finasteride (PROSCAR) tablet 5 mg (Discontinued) 5 mg Daily 12/29/2018 12/29/2018    Sig - Route: Take 1 tablet by mouth Daily. - Oral    Reason for Discontinue: Patient Discharge    Cosign for Ordering: Accepted by Mirella Bhandari MD on 12/31/2018  7:10 AM    fluticasone (FLONASE) 50 MCG/ACT nasal spray 2 spray (Discontinued) 2 spray Daily PRN 12/28/2018 12/29/2018    Sig - Route: 2 sprays into the nostril(s) as directed by provider Daily As Needed for Rhinitis or Allergies. - Nasal    Reason for Discontinue: Patient Discharge    Cosign for Ordering: Accepted by Mirella Bhandari MD on 12/31/2018  7:10 AM    furosemide (LASIX) tablet 20 mg (Discontinued) 20 mg Daily 12/29/2018 12/29/2018    Sig - Route: Take 1 tablet by mouth Daily. - Oral    Reason for Discontinue: Patient Discharge    Cosign for Ordering: Accepted by Mirella Bhandari MD  on 12/31/2018  7:10 AM    hydrALAZINE (APRESOLINE) tablet 10 mg (Discontinued) 10 mg Every 8 Hours PRN 12/28/2018 12/29/2018    Sig - Route: Take 1 tablet by mouth Every 8 (Eight) Hours As Needed (SBP > 180mHg). - Oral    Reason for Discontinue: Patient Discharge    Cosign for Ordering: Accepted by Mirella Bhandari MD on 12/31/2018  7:10 AM    ipratropium-albuterol (DUO-NEB) nebulizer solution 3 mL (Discontinued) 3 mL 4 Times Daily - RT 12/28/2018 12/29/2018    Sig - Route: Take 3 mL by nebulization 4 (Four) Times a Day. - Nebulization    Reason for Discontinue: Patient Discharge    isosorbide mononitrate (IMDUR) 24 hr tablet 60 mg (Discontinued) 60 mg Daily 12/29/2018 12/29/2018    Sig - Route: Take 1 tablet by mouth Daily. - Oral    Reason for Discontinue: Patient Discharge    Cosign for Ordering: Accepted by Mirella Bhandari MD on 12/31/2018  7:10 AM    magnesium oxide (MAGOX) tablet 400 mg (Discontinued) 400 mg Daily 12/29/2018 12/29/2018    Sig - Route: Take 1 tablet by mouth Daily. - Oral    magnesium oxide (MAGOX) tablet 400 mg (Discontinued) 400 mg Daily 12/29/2018 12/29/2018    Sig - Route: Take 1 tablet by mouth Daily. - Oral    Reason for Discontinue: Patient Discharge    magnesium sulfate in D5W 1g/100mL (PREMIX) (Discontinued) 1 g Once 12/29/2018 12/29/2018    Sig - Route: Infuse 100 mL into a venous catheter 1 (One) Time. - Intravenous    melatonin tablet 5.25 mg (Discontinued) 5.25 mg Nightly PRN 12/28/2018 12/29/2018    Sig - Route: Take 1.75 tablets by mouth At Night As Needed for Sleep. - Oral    Reason for Discontinue: Patient Discharge    Cosign for Ordering: Accepted by Mirella Bhandari MD on 12/31/2018  7:10 AM    metoprolol succinate XL (TOPROL-XL) 24 hr tablet 50 mg (Discontinued) 50 mg 2 Times Daily 12/28/2018 12/29/2018    Sig - Route: Take 1 tablet by mouth 2 (Two) Times a Day. - Oral    Reason for Discontinue: Patient Discharge    Cosign for Ordering:  "Accepted by Mirella Bhandari MD on 12/31/2018  7:10 AM    ondansetron (ZOFRAN) injection 4 mg (Discontinued) 4 mg Every 6 Hours PRN 12/28/2018 12/29/2018    Sig - Route: Infuse 2 mL into a venous catheter Every 6 (Six) Hours As Needed for Nausea or Vomiting. - Intravenous    Reason for Discontinue: Patient Discharge    Cosign for Ordering: Accepted by Mirella Bhandari MD on 12/31/2018  7:10 AM    Linked Group 2:  \"Or\" Linked Group Details        ondansetron (ZOFRAN) tablet 4 mg (Discontinued) 4 mg Every 6 Hours PRN 12/28/2018 12/29/2018    Sig - Route: Take 1 tablet by mouth Every 6 (Six) Hours As Needed for Nausea or Vomiting. - Oral    Reason for Discontinue: Patient Discharge    Cosign for Ordering: Accepted by Mirella Bhandari MD on 12/31/2018  7:10 AM    Linked Group 2:  \"Or\" Linked Group Details        ondansetron ODT (ZOFRAN-ODT) disintegrating tablet 4 mg (Discontinued) 4 mg Every 6 Hours PRN 12/28/2018 12/29/2018    Sig - Route: Take 1 tablet by mouth Every 6 (Six) Hours As Needed for Nausea or Vomiting. - Oral    Reason for Discontinue: Patient Discharge    Cosign for Ordering: Accepted by Mirella Bhandari MD on 12/31/2018  7:10 AM    Linked Group 2:  \"Or\" Linked Group Details        pantoprazole (PROTONIX) EC tablet 40 mg (Discontinued) 40 mg Daily 12/29/2018 12/29/2018    Sig - Route: Take 1 tablet by mouth Daily. - Oral    Reason for Discontinue: Patient Discharge    Cosign for Ordering: Accepted by Mirella Bhandari MD on 12/31/2018  7:10 AM    polyethylene glycol 3350 powder (packet) (Discontinued) 17 g Daily 12/29/2018 12/29/2018    Sig - Route: Take 17 g by mouth Daily. - Oral    Reason for Discontinue: Patient Discharge    Cosign for Ordering: Accepted by Mirella Bhandari MD on 12/31/2018  7:10 AM    sodium chloride 0.9 % flush 10 mL (Discontinued) 10 mL As Needed 12/28/2018 12/29/2018    Sig - Route: Infuse 10 mL into a venous " "catheter As Needed for Line Care. - Intravenous    Reason for Discontinue: Patient Discharge    Linked Group 3:  \"And\" Linked Group Details        sodium chloride 0.9 % flush 3 mL (Discontinued) 3 mL Every 12 Hours Scheduled 12/28/2018 12/29/2018    Sig - Route: Infuse 3 mL into a venous catheter Every 12 (Twelve) Hours. - Intravenous    Reason for Discontinue: Patient Discharge    Cosign for Ordering: Accepted by Mirella Bhandari MD on 12/31/2018  7:10 AM    sodium chloride 0.9 % flush 3-10 mL (Discontinued) 3-10 mL As Needed 12/28/2018 12/29/2018    Sig - Route: Infuse 3-10 mL into a venous catheter As Needed for Line Care. - Intravenous    Reason for Discontinue: Patient Discharge    Cosign for Ordering: Accepted by Mirella Bhandari MD on 12/31/2018  7:10 AM    Sodium chloride 0.9 % infusion (Discontinued) 75 mL/hr Continuous 12/28/2018 12/29/2018    Sig - Route: Infuse 75 mL/hr into a venous catheter Continuous. - Intravenous    Reason for Discontinue: Patient Discharge    spironolactone (ALDACTONE) tablet 25 mg (Discontinued) 25 mg Daily 12/29/2018 12/29/2018    Sig - Route: Take 1 tablet by mouth Daily. - Oral    Reason for Discontinue: Patient Discharge    Cosign for Ordering: Accepted by Mirella Bhandari MD on 12/31/2018  7:10 AM    THERA tablet 1 tablet (Discontinued) 1 tablet Daily 12/29/2018 12/29/2018    Sig - Route: Take 1 tablet by mouth Daily. - Oral    Reason for Discontinue: Patient Discharge    Cosign for Ordering: Accepted by Mirella Bhandari MD on 12/31/2018  7:10 AM          Lab Results (last 7 days)     Procedure Component Value Units Date/Time    Vitamin B12 [[id number]]  (Normal) Collected:  12/29/18 0637    Specimen:  Blood Updated:  12/29/18 0819     Vitamin B-12 311 pg/mL     Folate [[id number]]  (Normal) Collected:  12/29/18 0637    Specimen:  Blood Updated:  12/29/18 0819     Folate 14.70 ng/mL     Ferritin [[id number]]  (Normal) Collected:  " 12/29/18 0637    Specimen:  Blood Updated:  12/29/18 0749     Ferritin 154.00 ng/mL     Iron Profile [[id number]]  (Abnormal) Collected:  12/29/18 0637    Specimen:  Blood Updated:  12/29/18 0721     Iron 22 mcg/dL      TIBC 285 mcg/dL      Iron Saturation 8 %     Basic Metabolic Panel [[id number]]  (Abnormal) Collected:  12/29/18 0637    Specimen:  Blood Updated:  12/29/18 0714     Glucose 119 mg/dL      BUN 26 mg/dL      Creatinine 1.38 mg/dL      Sodium 137 mmol/L      Potassium 4.3 mmol/L      Chloride 99 mmol/L      CO2 31.0 mmol/L      Calcium 8.6 mg/dL      eGFR Non African Amer 51 mL/min/1.73      BUN/Creatinine Ratio 18.8     Anion Gap 7.0 mmol/L     Narrative:       The MDRD GFR formula is only valid for adults with stable renal function between ages 18 and 70.    Uric Acid [[id number]]  (Normal) Collected:  12/29/18 0637    Specimen:  Blood Updated:  12/29/18 0714     Uric Acid 5.3 mg/dL     CBC & Differential [[id number]] Collected:  12/29/18 0637    Specimen:  Blood Updated:  12/29/18 0648    Narrative:       The following orders were created for panel order CBC & Differential.  Procedure                               Abnormality         Status                     ---------                               -----------         ------                     CBC Auto Differential[[id number]]        Abnormal            Final result                 Please view results for these tests on the individual orders.    CBC Auto Differential [[id number]]  (Abnormal) Collected:  12/29/18 0637    Specimen:  Blood Updated:  12/29/18 0648     WBC 8.77 10*3/mm3      RBC 3.62 10*6/mm3      Hemoglobin 12.2 g/dL      Hematocrit 35.7 %      MCV 98.6 fL      MCH 33.7 pg      MCHC 34.2 g/dL      RDW 14.2 %      RDW-SD 51.2 fl      MPV 9.1 fL      Platelets 168 10*3/mm3      Neutrophil % 54.5 %      Lymphocyte % 31.8 %      Monocyte % 9.0 %      Eosinophil % 4.3 %      Basophil % 0.1 %      Immature Grans % 0.3 %      Neutrophils, Absolute  4.77 10*3/mm3      Lymphocytes, Absolute 2.79 10*3/mm3      Monocytes, Absolute 0.79 10*3/mm3      Eosinophils, Absolute 0.38 10*3/mm3      Basophils, Absolute 0.01 10*3/mm3      Immature Grans, Absolute 0.03 10*3/mm3     Magnesium [[id number]]  (Normal) Collected:  12/28/18 1730    Specimen:  Blood Updated:  12/28/18 2137     Magnesium 1.6 mg/dL     Troponin [[id number]]  (Normal) Collected:  12/28/18 1730    Specimen:  Blood Updated:  12/28/18 1956     Troponin I <0.012 ng/mL     BNP [[id number]]  (Normal) Collected:  12/28/18 1730    Specimen:  Blood Updated:  12/28/18 1956     proBNP 235.0 pg/mL     Extra Tubes [[id number]] Collected:  12/28/18 1730    Specimen:  Blood, Venous Line Updated:  12/28/18 1832    Narrative:       The following orders were created for panel order Extra Tubes.  Procedure                               Abnormality         Status                     ---------                               -----------         ------                     Gold Top - SST[[id number]]                                   Final result                 Please view results for these tests on the individual orders.    Gold Top - SST [[id number]] Collected:  12/28/18 1730    Specimen:  Blood Updated:  12/28/18 1832     Extra Tube Hold for add-ons.     Comment: Auto resulted.       D-dimer, Quantitative [[id number]]  (Abnormal) Collected:  12/28/18 1730    Specimen:  Blood Updated:  12/28/18 1813     D-Dimer, Quantitative >4,000 ng/mL (FEU)     Narrative:       Dimer values <500 ng/ml FEU are FDA approved as aid in diagnosis of deep venous thrombosis and pulmonary embolism.  This test should not be used in an exclusion strategy with pretest probability alone.    A recent guideline regarding diagnosis for pulmonary thromboembolism recommends an adjusted exclusion criterion of age x 10 ng/ml FEU for patients >50 years of age ([employer] 2015; 163: 701-711).    aPTT [[id number]]  (Normal) Collected:  12/28/18 1730    Specimen:   Blood Updated:  12/28/18 1804     PTT 24.8 seconds     Narrative:       The recommended Heparin therapeutic range is 68-97 seconds.    Protime-INR [[id number]]  (Normal) Collected:  12/28/18 1730    Specimen:  Blood Updated:  12/28/18 1804     Protime 12.9 Seconds      INR 0.99    Narrative:       Therapeutic range for most indications is 2.0-3.0 INR,  or 2.5-3.5 for mechanical heart valves.    Comprehensive Metabolic Panel [[id number]]  (Abnormal) Collected:  12/28/18 1730    Specimen:  Blood Updated:  12/28/18 1749     Glucose 107 mg/dL      BUN 26 mg/dL      Creatinine 1.31 mg/dL      Sodium 135 mmol/L      Potassium 4.5 mmol/L      Chloride 93 mmol/L      CO2 35.0 mmol/L      Calcium 9.3 mg/dL      Total Protein 7.2 g/dL      Albumin 4.10 g/dL      ALT (SGPT) 31 U/L      AST (SGOT) 48 U/L      Alkaline Phosphatase 86 U/L      Total Bilirubin 0.7 mg/dL      eGFR Non African Amer 54 mL/min/1.73      Globulin 3.1 gm/dL      A/G Ratio 1.3 g/dL      BUN/Creatinine Ratio 19.8     Anion Gap 7.0 mmol/L     Narrative:       The MDRD GFR formula is only valid for adults with stable renal function between ages 18 and 70.    CBC & Differential [[id number]] Collected:  12/28/18 1730    Specimen:  Blood Updated:  12/28/18 1739    Narrative:       The following orders were created for panel order CBC & Differential.  Procedure                               Abnormality         Status                     ---------                               -----------         ------                     CBC Auto Differential[[id number]]        Abnormal            Final result                 Please view results for these tests on the individual orders.    CBC Auto Differential [[id number]]  (Abnormal) Collected:  12/28/18 1730    Specimen:  Blood Updated:  12/28/18 1739     WBC 11.02 10*3/mm3      RBC 3.79 10*6/mm3      Hemoglobin 12.6 g/dL      Hematocrit 37.3 %      MCV 98.4 fL      MCH 33.2 pg      MCHC 33.8 g/dL      RDW 14.1 %      RDW-SD  50.3 fl      MPV 9.1 fL      Platelets 167 10*3/mm3      Neutrophil % 58.9 %      Lymphocyte % 29.9 %      Monocyte % 7.8 %      Eosinophil % 2.9 %      Basophil % 0.1 %      Immature Grans % 0.4 %      Neutrophils, Absolute 6.50 10*3/mm3      Lymphocytes, Absolute 3.29 10*3/mm3      Monocytes, Absolute 0.86 10*3/mm3      Eosinophils, Absolute 0.32 10*3/mm3      Basophils, Absolute 0.01 10*3/mm3      Immature Grans, Absolute 0.04 10*3/mm3           Imaging Results (last 7 days)     Procedure Component Value Units Date/Time    CT Angiogram Chest With Contrast [[id number]] Collected:  12/28/18 1902     Updated:  12/28/18 1952    Narrative:         CT angiogram of the chest with contrast    History:  Elevated d-dimer    Axial spiral scans of the chest were obtained  with  intravenous  contrast.  Coronal and sagittal reconstructions and both oblique  coronal MIPS obtained the same workstation.    This exam was performed according to our departmental  dose-optimization program, which includes automated exposure  control, adjustment of the mA and/or kV according to patient size  and/or use of iterative reconstruction technique.    DLP: 436.50    Comparison:    Right lower lobe segmental and subsegmental emboli.  No mediastinal hematoma.  No hilar, mediastinal or axillary adenopathy.  No thoracic aortic aneurysm or dissection.  No pericardial effusion.    Linear atelectasis lung bases.  No mass or noncalcified nodule.  No pleural effusion.  No pneumothorax.    Small hiatal hernia.  4 cm right renal cyst.  Less than 1 cm hyperdense left renal cyst.    No acute osseous abnormality.  Degenerative changes in the thoracic spine.      Impression:       Conclusion:  Right lower lobe segmental and subsegmental emboli.    Report called at approximately 7:55 PM CST.    [id number]    Electronically signed by:  London Hurt MD  12/28/2018 7:51 PM  CST Workstation: [id number]    XR Chest 1 View [[id number]] Collected:  12/28/18 8139      Updated:  12/28/18 1741    Narrative:         PORTABLE CHEST    HISTORY: DVT    Portable AP upright film of the chest was obtained at 5:22 PM.  COMPARISON: February 18, 2018    The lungs are clear of an acute process.  Eventration right hemidiaphragm.  The heart is not enlarged.  The pulmonary vasculature is not increased.  No pleural effusion.  No pneumothorax.  No acute osseous abnormality.  Degenerative changes are present in the thoracic spine.      Impression:       CONCLUSION:  No Acute Disease    [id number]    Electronically signed by:  London Hurt MD  12/28/2018 5:40 PM  CST Workstation: [id number]        ECG/EMG Results (last 7 days)     Procedure Component Value Units Date/Time    ECG 12 Lead [[id number]] Collected:  12/28/18 1719     Updated:  12/29/18 0752    Narrative:       Test Reason : dvt  Blood Pressure : **/** mmHG  Vent. Rate : 076 BPM     Atrial Rate : 076 BPM     P-R Int : 154 ms          QRS Dur : 080 ms      QT Int : 374 ms       P-R-T Axes : 034 063 034 degrees     QTc Int : 420 ms    Normal sinus rhythm  Normal ECG    Confirmed by ANIA MOROCHO MD ([id number]) on 12/29/2018 7:52:41 AM    Referred By:  ERDR           Confirmed By:ANIA MOROCHO MD    Adult Transthoracic Echo Complete W/ Cont if Necessary Per Protocol [[id number]] Collected:  12/29/18 1024     Updated:  12/29/18 1057     BSA 2.2 m^2      BH CV ECHO JACQUELINE - RVDD 3.4 cm      IVSd 1.2 cm      LVIDd 4.2 cm      LVIDs 2.4 cm      LVPWd 1.1 cm      IVS/LVPW 1.0     FS 42.7 %      EDV(Teich) 78.1 ml      ESV(Teich) 20.2 ml      EF(Teich) 74.2 %      EDV(cubed) 73.6 ml      ESV(cubed) 13.8 ml      EF(cubed) 81.2 %      LV mass(C)d 164.7 grams      LV mass(C)dI 73.9 grams/m^2      SV(Teich) 58.0 ml      SI(Teich) 26.0 ml/m^2      SV(cubed) 59.7 ml      SI(cubed) 26.8 ml/m^2      Ao root diam 3.2 cm      Ao root area 8.0 cm^2      ACS 2.1 cm      LA dimension 3.6 cm      asc Aorta Diam 3.3 cm      LA/Ao 1.1     LVOT diam 2.3 cm      LVOT area  4.2 cm^2      LVOT area(traced) 4.2 cm^2      Ao root area (BSA corrected) 1.4     MV E max monico 118.0 cm/sec      MV A max monico 85.4 cm/sec      MV E/A 1.4     MV P1/2t max monico 123.0 cm/sec      MV P1/2t 66.7 msec      MVA(P1/2t) 3.3 cm^2      MV dec slope 540.0 cm/sec^2      Ao pk monico 133.0 cm/sec      Ao max PG 7.1 mmHg      Ao max PG (full) -0.32 mmHg      Ao V2 mean 94.9 cm/sec      Ao mean PG 4.0 mmHg      Ao mean PG (full) 1.0 mmHg      Ao V2 VTI 28.0 cm      MAMIE(I,A) 3.9 cm^2      MAMIE(I,D) 3.9 cm^2      MAMIE(V,A) 4.2 cm^2      MAMIE(V,D) 4.2 cm^2      LV V1 max PG 7.4 mmHg      LV V1 mean PG 3.0 mmHg      LV V1 max 136.0 cm/sec      LV V1 mean 83.3 cm/sec      LV V1 VTI 26.1 cm      MR max monico 372.0 cm/sec      MR max PG 55.4 mmHg      SV(Ao) 225.2 ml      SI(Ao) 101.1 ml/m^2      SV(LVOT) 108.4 ml      SI(LVOT) 48.7 ml/m^2      PA V2 max 111.0 cm/sec      PA max PG 4.9 mmHg      TR max monico 212.0 cm/sec      RVSP(TR) 23.0 mmHg      RAP systole 5.0 mmHg      Pulm Sys Monico 71.9 cm/sec      Pulm Wong Monico 47.5 cm/sec      Pulm S/D 1.5     Pulm A Revs Dur 0.14 sec      Pulm A Revs Monico 30.5 cm/sec      MVA P1/2T LCG 1.8 cm^2       CV ECHO JACQUELINE - BZI_BMI 37.3 kilograms/m^2       CV ECHO JACQUELINE - BSA(HAYCOCK) 2.4 m^2       CV ECHO JACQUELINE - BZI_METRIC_WEIGHT 111.1 kg       CV ECHO JACQUELINE - BZI_METRIC_HEIGHT 172.7 cm      TAPSE (>1.6) 2.20 cm2     Narrative:       · Left ventricle with preserved systolic function. Estimated LV EF 65%.   Mild concentric hypertrophy with normal diastolic function.  · Right ventricle is mildly dilated with normal systolic function. Right   ventricle EF greater than 55%.  · No significant valvular abnormalities. No evidence of pulmonary   hypertension.                Physician Progress Notes (last 7 days) (Notes from 12/27/2018  8:07 AM through 1/3/2019  8:07 AM)      Eddie Rodriguez Jr., MD at 12/29/2018 12:41 PM     Attestation signed by Maicol Roca MD at 12/29/2018  1:27 PM    I  have seen and evaluated the patient.  I have discussed the case with the resident. I have reviewed the notes, assessment and plan, and/or procedures performed by the resident. I concur with the resident’s documentation.       Please see H&P for full attending attestation.      This document has been electronically signed by Maicol Roca MD on 2018 1:27 PM                             FAMILY MEDICINE DAILY PROGRESS NOTE    NAME: Noe Barnes Sr.  : 1945  MRN: [id number]      LOS: 1 day     PROVIDER OF SERVICE: Eddie Rodriguez Jr, MD    Chief Complaint: Pulmonary embolus (CMS/HCC)    Subjective:     Interval History:  History taken from: patient chart RN  No acute events overnight.  Patient denies any chest pains, shortness of breath, or other symptoms this morning.  No other complaints today.     Review of Systems:   Review of Systems   Constitutional: Negative for chills, diaphoresis, fatigue and fever.   HENT: Negative for ear pain and sore throat.    Eyes: Negative for pain and visual disturbance.   Respiratory: Negative for chest tightness and shortness of breath.    Cardiovascular: Negative for chest pain and palpitations.   Gastrointestinal: Negative for abdominal distention, abdominal pain, diarrhea and nausea.   Endocrine: Negative for polydipsia and polyuria.   Genitourinary: Negative for dysuria, flank pain and hematuria.   Musculoskeletal: Negative for arthralgias, joint swelling and myalgias.   Skin: Negative for color change and pallor.   Neurological: Negative for dizziness, seizures and speech difficulty.   Hematological: Negative for adenopathy. Does not bruise/bleed easily.   Psychiatric/Behavioral: Negative for agitation and confusion.       Objective:     Vital Signs  Temp:  [96.6 °F (35.9 °C)-98 °F (36.7 °C)] 96.6 °F (35.9 °C)  Heart Rate:  [74-90] 74  Resp:  [16-20] 18  BP: (125-169)/(63-81) 138/65  Body mass index is 37.25 kg/m².    Physical Exam  Physical Exam    Constitutional: He is oriented to person, place, and time. He appears well-developed and well-nourished. No distress.   HENT:   Head: Normocephalic and atraumatic.   Right Ear: External ear normal.   Left Ear: External ear normal.   Nose: Nose normal.   Mouth/Throat: Oropharynx is clear and moist. No oropharyngeal exudate.   Eyes: Conjunctivae and EOM are normal. Pupils are equal, round, and reactive to light. Right eye exhibits no discharge. Left eye exhibits no discharge. No scleral icterus.   Neck: Normal range of motion. Neck supple. No tracheal deviation present. No thyromegaly present.   Cardiovascular: Normal rate, regular rhythm, S1 normal, S2 normal, normal heart sounds and intact distal pulses. Exam reveals no gallop and no friction rub.   No murmur heard.  Pulmonary/Chest: Effort normal and breath sounds normal. No stridor. No respiratory distress. He has no wheezes. He has no rales. He exhibits no tenderness.   Abdominal: Soft. Bowel sounds are normal. He exhibits no distension. There is no tenderness.   Musculoskeletal: Normal range of motion. He exhibits no edema, tenderness or deformity.   Neurological: He is alert and oriented to person, place, and time. No cranial nerve deficit.   Skin: Skin is warm and dry. Capillary refill takes less than 2 seconds. No rash noted. He is not diaphoretic. No erythema.   Psychiatric: He has a normal mood and affect. His behavior is normal. He expresses no suicidal plans and no homicidal plans.   Vitals reviewed.      Medication Review    Current Facility-Administered Medications:   •  acetaminophen (TYLENOL) tablet 650 mg, 650 mg, Oral, Q4H PRN, Sid Zepeda MD, 650 mg at 12/29/18 0413  •  allopurinol (ZYLOPRIM) tablet 300 mg, 300 mg, Oral, Daily, Sid Zepeda MD, 300 mg at 12/29/18 0834  •  apixaban (ELIQUIS) tablet 10 mg, 10 mg, Oral, Q12H, 10 mg at 12/29/18 0834 **FOLLOWED BY** [START ON 1/5/2019] apixaban (ELIQUIS) tablet 5 mg, 5 mg, Oral,  Q12H, Sid Zepeda MD  •  atorvastatin (LIPITOR) tablet 10 mg, 10 mg, Oral, Daily, Sid Zepeda MD, 10 mg at 12/29/18 0834  •  calcium carbonate (TUMS) chewable tablet 500 mg (200 mg elemental), 2 tablet, Oral, BID PRN, Sid Zepeda MD  •  ferrous sulfate EC tablet 324 mg, 324 mg, Oral, Daily With Breakfast, Sid Zepeda MD, 324 mg at 12/29/18 0834  •  finasteride (PROSCAR) tablet 5 mg, 5 mg, Oral, Daily, Sid Zepeda MD, 5 mg at 12/29/18 0835  •  fluticasone (FLONASE) 50 MCG/ACT nasal spray 2 spray, 2 spray, Nasal, Daily PRN, Sid Zepeda MD  •  furosemide (LASIX) tablet 20 mg, 20 mg, Oral, Daily, Sid Zepeda MD, 20 mg at 12/29/18 0835  •  hydrALAZINE (APRESOLINE) tablet 10 mg, 10 mg, Oral, Q8H PRN, Sid Zepeda MD  •  ipratropium-albuterol (DUO-NEB) nebulizer solution 3 mL, 3 mL, Nebulization, 4x Daily - RT, King Saunders MD, 3 mL at 12/29/18 0727  •  isosorbide mononitrate (IMDUR) 24 hr tablet 60 mg, 60 mg, Oral, Daily, Sid Zepeda MD, 60 mg at 12/29/18 0835  •  melatonin tablet 5.25 mg, 5.25 mg, Oral, Nightly PRN, Sid Zepeda MD  •  metoprolol succinate XL (TOPROL-XL) 24 hr tablet 50 mg, 50 mg, Oral, BID, Sid Zepeda MD, 50 mg at 12/29/18 0835  •  ondansetron (ZOFRAN) tablet 4 mg, 4 mg, Oral, Q6H PRN **OR** ondansetron ODT (ZOFRAN-ODT) disintegrating tablet 4 mg, 4 mg, Oral, Q6H PRN **OR** ondansetron (ZOFRAN) injection 4 mg, 4 mg, Intravenous, Q6H PRN, Sid Zepeda MD  •  pantoprazole (PROTONIX) EC tablet 40 mg, 40 mg, Oral, Daily, Sid Zepeda MD, 40 mg at 12/29/18 0835  •  polyethylene glycol 3350 powder (packet), 17 g, Oral, Daily, Sid Zepeda MD, 17 g at 12/29/18 0834  •  [COMPLETED] Insert peripheral IV, , , Once **AND** sodium chloride 0.9 % flush 10 mL, 10 mL, Intravenous, PRN, King Saunders MD  •  sodium chloride 0.9 % flush 3 mL, 3 mL, Intravenous, Q12H, [place],  Sid WELLS MD, 3 mL at 12/29/18 0838  •  sodium chloride 0.9 % flush 3-10 mL, 3-10 mL, Intravenous, PRN, Sid Zepeda MD  •  Sodium chloride 0.9 % infusion, 75 mL/hr, Intravenous, Continuous, King Saunders MD, Last Rate: 75 mL/hr at 12/29/18 0523, 75 mL/hr at 12/29/18 0523  •  spironolactone (ALDACTONE) tablet 25 mg, 25 mg, Oral, Daily, Sid Zepeda MD, 25 mg at 12/29/18 0835  •  THERA tablet 1 tablet, 1 tablet, Oral, Daily, Sid Zepeda MD, 1 tablet at 12/29/18 0834     Diagnostic Data    Lab Results (last 24 hours)     Procedure Component Value Units Date/Time    Vitamin B12 [[id number]]  (Normal) Collected:  12/29/18 0637    Specimen:  Blood Updated:  12/29/18 0819     Vitamin B-12 311 pg/mL     Folate [[id number]]  (Normal) Collected:  12/29/18 0637    Specimen:  Blood Updated:  12/29/18 0819     Folate 14.70 ng/mL     Ferritin [[id number]]  (Normal) Collected:  12/29/18 0637    Specimen:  Blood Updated:  12/29/18 0749     Ferritin 154.00 ng/mL     Iron Profile [[id number]]  (Abnormal) Collected:  12/29/18 0637    Specimen:  Blood Updated:  12/29/18 0721     Iron 22 mcg/dL      TIBC 285 mcg/dL      Iron Saturation 8 %     Basic Metabolic Panel [[id number]]  (Abnormal) Collected:  12/29/18 0637    Specimen:  Blood Updated:  12/29/18 0714     Glucose 119 mg/dL      BUN 26 mg/dL      Creatinine 1.38 mg/dL      Sodium 137 mmol/L      Potassium 4.3 mmol/L      Chloride 99 mmol/L      CO2 31.0 mmol/L      Calcium 8.6 mg/dL      eGFR Non African Amer 51 mL/min/1.73      BUN/Creatinine Ratio 18.8     Anion Gap 7.0 mmol/L     Narrative:       The MDRD GFR formula is only valid for adults with stable renal function between ages 18 and 70.    Uric Acid [[id number]]  (Normal) Collected:  12/29/18 0637    Specimen:  Blood Updated:  12/29/18 0714     Uric Acid 5.3 mg/dL     CBC & Differential [[id number]] Collected:  12/29/18 0637    Specimen:  Blood Updated:  12/29/18 0648    Narrative:       The  following orders were created for panel order CBC & Differential.  Procedure                               Abnormality         Status                     ---------                               -----------         ------                     CBC Auto Differential[[id number]]        Abnormal            Final result                 Please view results for these tests on the individual orders.    CBC Auto Differential [[id number]]  (Abnormal) Collected:  12/29/18 0637    Specimen:  Blood Updated:  12/29/18 0648     WBC 8.77 10*3/mm3      RBC 3.62 10*6/mm3      Hemoglobin 12.2 g/dL      Hematocrit 35.7 %      MCV 98.6 fL      MCH 33.7 pg      MCHC 34.2 g/dL      RDW 14.2 %      RDW-SD 51.2 fl      MPV 9.1 fL      Platelets 168 10*3/mm3      Neutrophil % 54.5 %      Lymphocyte % 31.8 %      Monocyte % 9.0 %      Eosinophil % 4.3 %      Basophil % 0.1 %      Immature Grans % 0.3 %      Neutrophils, Absolute 4.77 10*3/mm3      Lymphocytes, Absolute 2.79 10*3/mm3      Monocytes, Absolute 0.79 10*3/mm3      Eosinophils, Absolute 0.38 10*3/mm3      Basophils, Absolute 0.01 10*3/mm3      Immature Grans, Absolute 0.03 10*3/mm3     Magnesium [[id number]]  (Normal) Collected:  12/28/18 1730    Specimen:  Blood Updated:  12/28/18 2137     Magnesium 1.6 mg/dL     Troponin [[id number]]  (Normal) Collected:  12/28/18 1730    Specimen:  Blood Updated:  12/28/18 1956     Troponin I <0.012 ng/mL     BNP [[id number]]  (Normal) Collected:  12/28/18 1730    Specimen:  Blood Updated:  12/28/18 1956     proBNP 235.0 pg/mL     Extra Tubes [[id number]] Collected:  12/28/18 1730    Specimen:  Blood, Venous Line Updated:  12/28/18 1832    Narrative:       The following orders were created for panel order Extra Tubes.  Procedure                               Abnormality         Status                     ---------                               -----------         ------                     Gold Top - SST[[id number]]                                    Final result                 Please view results for these tests on the individual orders.    Gold Top - SST [[id number]] Collected:  12/28/18 1730    Specimen:  Blood Updated:  12/28/18 1832     Extra Tube Hold for add-ons.     Comment: Auto resulted.       D-dimer, Quantitative [[id number]]  (Abnormal) Collected:  12/28/18 1730    Specimen:  Blood Updated:  12/28/18 1813     D-Dimer, Quantitative >4,000 ng/mL (FEU)     Narrative:       Dimer values <500 ng/ml FEU are FDA approved as aid in diagnosis of deep venous thrombosis and pulmonary embolism.  This test should not be used in an exclusion strategy with pretest probability alone.    A recent guideline regarding diagnosis for pulmonary thromboembolism recommends an adjusted exclusion criterion of age x 10 ng/ml FEU for patients >50 years of age ([employer] Intern Med 2015; 163: 701-711).    aPTT [[id number]]  (Normal) Collected:  12/28/18 1730    Specimen:  Blood Updated:  12/28/18 1804     PTT 24.8 seconds     Narrative:       The recommended Heparin therapeutic range is 68-97 seconds.    Protime-INR [[id number]]  (Normal) Collected:  12/28/18 1730    Specimen:  Blood Updated:  12/28/18 1804     Protime 12.9 Seconds      INR 0.99    Narrative:       Therapeutic range for most indications is 2.0-3.0 INR,  or 2.5-3.5 for mechanical heart valves.    Comprehensive Metabolic Panel [[id number]]  (Abnormal) Collected:  12/28/18 1730    Specimen:  Blood Updated:  12/28/18 1749     Glucose 107 mg/dL      BUN 26 mg/dL      Creatinine 1.31 mg/dL      Sodium 135 mmol/L      Potassium 4.5 mmol/L      Chloride 93 mmol/L      CO2 35.0 mmol/L      Calcium 9.3 mg/dL      Total Protein 7.2 g/dL      Albumin 4.10 g/dL      ALT (SGPT) 31 U/L      AST (SGOT) 48 U/L      Alkaline Phosphatase 86 U/L      Total Bilirubin 0.7 mg/dL      eGFR Non African Amer 54 mL/min/1.73      Globulin 3.1 gm/dL      A/G Ratio 1.3 g/dL      BUN/Creatinine Ratio 19.8     Anion Gap 7.0 mmol/L     Narrative:        The MDRD GFR formula is only valid for adults with stable renal function between ages 18 and 70.    CBC & Differential [[id number]] Collected:  12/28/18 1730    Specimen:  Blood Updated:  12/28/18 1739    Narrative:       The following orders were created for panel order CBC & Differential.  Procedure                               Abnormality         Status                     ---------                               -----------         ------                     CBC Auto Differential[[id number]]        Abnormal            Final result                 Please view results for these tests on the individual orders.    CBC Auto Differential [[id number]]  (Abnormal) Collected:  12/28/18 1730    Specimen:  Blood Updated:  12/28/18 1739     WBC 11.02 10*3/mm3      RBC 3.79 10*6/mm3      Hemoglobin 12.6 g/dL      Hematocrit 37.3 %      MCV 98.4 fL      MCH 33.2 pg      MCHC 33.8 g/dL      RDW 14.1 %      RDW-SD 50.3 fl      MPV 9.1 fL      Platelets 167 10*3/mm3      Neutrophil % 58.9 %      Lymphocyte % 29.9 %      Monocyte % 7.8 %      Eosinophil % 2.9 %      Basophil % 0.1 %      Immature Grans % 0.4 %      Neutrophils, Absolute 6.50 10*3/mm3      Lymphocytes, Absolute 3.29 10*3/mm3      Monocytes, Absolute 0.86 10*3/mm3      Eosinophils, Absolute 0.32 10*3/mm3      Basophils, Absolute 0.01 10*3/mm3      Immature Grans, Absolute 0.04 10*3/mm3             I reviewed the patient's new clinical results.    Assessment/Plan:     Active Hospital Problems    Diagnosis Date Noted   • **Pulmonary embolus (CMS/[facility]) [I26.99] 12/28/2018     Will place patient on telemetry  ECHO in AM for right heart strain  Initiation NOAC in AM  Attempt enrollment in coumadin clinic for medication assistance     • DVT (deep vein thrombosis) in pregnancy (CMS/[facility]) [O22.30, I82.409] 12/28/2018     Continue anticoagulation as started in ED, transition to NOAC for three months     • CKD (chronic kidney disease) stage 3, GFR 30-59 ml/min (CMS/[facility]) [N18.3]  12/28/2018     Creatinine currently at baseline  Avoid nephrotoxic agents     • Leukocytosis [D72.829] 12/28/2018     No left shift  Likely secondary to DVT  Monitor, VSS       • BPH (benign prostatic hyperplasia) [N40.0] 12/28/2018     Continue proscar     • (HFpEF) heart failure with preserved ejection fraction (CMS/HCC) [I50.30] 12/28/2018     ECHO 7/5/18: EF 50-55%  AHA Stage C  NYHA Class II-III  Will continue home spirolactone but other renally active drugs will be held due to recent contrast use for CTA chest     • 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] 09/11/2017     Continue allopurinol  Repeat uric acid  Monitor urine output >2 L/day     • Mixed hyperlipidemia [E78.2]      Continue statin  Lipid panel 10 months ago appropriate     • Iron deficiency anemia due to dietary causes [D50.8]      Continue daily Fe supplement  Repeat Fe studies  Folate and B12 as MCV elevated         DVT prophylaxis: NOAC  Code status is   Code Status and Medical Interventions:   Ordered at: 12/28/18 2017     Level Of Support Discussed With:    Patient     Code Status:    CPR     Medical Interventions (Level of Support Prior to Arrest):    Full       Plan for disposition:Where: home and When:  today if Eliquis can be secured for patient      Time: 35     Eddie Rodriguez Jr., M.D.  PGY2  Family Medicine Resident  [address] [place] [address]  Phone: [phone]  Fax: [phone]      This document has been electronically signed by Eddie Rodriguez Jr, MD on 12/29/18 12:46 PM          Electronically signed by Maicol Roca MD at 12/29/2018  1:27 PM       Consult Notes (last 7 days) (Notes from 12/27/18 through 01/03/19)     No notes of this type exist for this encounter.        Discharge Summary     No notes of this type exist for this encounter.

## 2019-01-03 NOTE — PROGRESS NOTES
Chief Complaint   Patient presents with   • Hospital Discharge Follow-Up      Hospital F/U     Subjective   Noe Barnes Sr. is a 73 y.o. male who presents to the office for a hospital follow-up.  He was admitted to the hospital in [place] on 12/28/18 and discharged on 12/29/18.  Approximately one week prior to the admission, he fell in the shower and had some persistent shoulder pain.  Several days later he started having pain behind his left knee.  He also started having some shortness of breath.  On 12/28/18 his pain and shortness of breath increased quite a bit, so he went to the urgent care for further evaluation.  He had an ultrasound of the lower extremities which showed the presence of a DVT in the left lower extremity.  He was then sent to the hospital for admission and further treatment.  While in the emergency room, he had a CTA of the chest which showed right lower lobe segmental and subsegmental emboli.  He was given Lovenox.  At discharge he was transitioned to Eliquis for anticoagulation.  While in the hospital, he had an echocardiogram which showed an ejection fraction of 65%.  He had preserved systolic function of left ventricle with mild concentric hypertrophy and normal diastolic function.  He has a history of heart failure with preserved ejection fraction, and this has remained stable.    He has been feeling well since his discharge.  His leg swelling has now resolved.  He is tolerating all of his medications without any side effects.    The following portions of the patient's history were reviewed and updated as appropriate: allergies, current medications, past family history, past medical history, past social history, past surgical history and problem list.    Review of Systems   Constitutional: Negative for chills, fatigue and fever.   HENT: Negative for congestion, sneezing, sore throat and trouble swallowing.    Eyes: Negative for visual disturbance.   Respiratory: Negative for  "cough, chest tightness, shortness of breath and wheezing.    Cardiovascular: Negative for chest pain, palpitations and leg swelling.   Gastrointestinal: Negative for abdominal pain, constipation, diarrhea, nausea and vomiting.   Genitourinary: Negative for dysuria, frequency and urgency.   Musculoskeletal: Negative for neck pain.   Neurological: Positive for weakness. Negative for dizziness and headaches.   Psychiatric/Behavioral:        Patient denies any feelings of depression and has not felt down, hopeless or lost interest in any activities.   All other systems reviewed and are negative.      Objective   Vitals:    01/03/19 0846   BP: 134/64   BP Location: Left arm   Patient Position: Sitting   Cuff Size: Adult   Pulse: 76   Temp: 98.2 °F (36.8 °C)   TempSrc: Oral   Weight: 112 kg (247 lb)   Height: 172.7 cm (68\")   PainSc:   2   PainLoc: Leg  Comment: left leg     Physical Exam   Constitutional: He is oriented to person, place, and time. He appears well-developed and well-nourished. No distress.   HENT:   Head: Normocephalic and atraumatic.   Nose: Nose normal.   Mouth/Throat: Oropharynx is clear and moist. No oropharyngeal exudate.   Eyes: Conjunctivae and EOM are normal. Pupils are equal, round, and reactive to light. No scleral icterus.   Neck: Normal range of motion. Neck supple.   Cardiovascular: Normal rate, regular rhythm and normal heart sounds. Exam reveals no gallop and no friction rub.   No murmur heard.  Pulmonary/Chest: Effort normal and breath sounds normal. No respiratory distress. He has no wheezes. He has no rales.   Abdominal: Soft. Bowel sounds are normal. He exhibits no distension. There is no tenderness. There is no rebound and no guarding.   Musculoskeletal: Normal range of motion. He exhibits no edema.   Lymphadenopathy:     He has no cervical adenopathy.   Neurological: He is alert and oriented to person, place, and time. No cranial nerve deficit.   Skin: Skin is warm and dry. No rash " noted.   Psychiatric: He has a normal mood and affect. His behavior is normal. Judgment and thought content normal.   Nursing note and vitals reviewed.      Assessment/Plan   Noe was seen today for hospital discharge follow-up.    Diagnoses and all orders for this visit:    Hospital discharge follow-up    Acute deep vein thrombosis (DVT) of femoral vein of left lower extremity (CMS/Formerly [facility])    Other acute pulmonary embolism without acute cor pulmonale (CMS/[facility])    (HFpEF) heart failure with preserved ejection fraction (CMS/[facility])    Morbidly obese (CMS/[facility])    CKD (chronic kidney disease) stage 3, GFR 30-59 ml/min (CMS/[facility])         I reviewed hospital records from his recent admission.  These are discussed above in the history of present illness.  He will continue with Eliquis for anticoagulation.  His blood pressure is controlled, and he will continue with his current blood pressure medication.  He has chronic renal failure, and his renal function is currently baseline.  His heart failure also remains baseline, and he will follow-up with cardiology as scheduled.    Patients BMI indicates that he is overweight.  I discussed the importance of weight loss, and have recommended a diet and exercise regimen.    Current outpatient and discharge medications have been reconciled for the patient.  Reviewed by: Elia Herron MD      PHQ-2/PHQ-9 Depression Screening 1/3/2019   Little interest or pleasure in doing things 0   Feeling down, depressed, or hopeless 0   Trouble falling or staying asleep, or sleeping too much -   Feeling tired or having little energy -   Poor appetite or overeating -   Feeling bad about yourself - or that you are a failure or have let yourself or your family down -   Trouble concentrating on things, such as reading the newspaper or watching television -   Moving or speaking so slowly that other people could have noticed. Or the opposite - being so fidgety or restless that you have been moving around a  lot more than usual -   Thoughts that you would be better off dead, or of hurting yourself in some way -   Total Score 0   If you checked off any problems, how difficult have these problems made it for you to do your work, take care of things at home, or get along with other people? -         Admission on 12/28/2018, Discharged on 12/29/2018   Component Date Value Ref Range Status   • Glucose 12/28/2018 107* 60 - 100 mg/dL Final   • BUN 12/28/2018 26* 7 - 21 mg/dL Final   • Creatinine 12/28/2018 1.31* 0.70 - 1.30 mg/dL Final   • Sodium 12/28/2018 135* 137 - 145 mmol/L Final   • Potassium 12/28/2018 4.5  3.5 - 5.1 mmol/L Final   • Chloride 12/28/2018 93* 95 - 110 mmol/L Final   • CO2 12/28/2018 35.0* 22.0 - 31.0 mmol/L Final   • Calcium 12/28/2018 9.3  8.4 - 10.2 mg/dL Final   • Total Protein 12/28/2018 7.2  6.3 - 8.6 g/dL Final   • Albumin 12/28/2018 4.10  3.40 - 4.80 g/dL Final   • ALT (SGPT) 12/28/2018 31  21 - 72 U/L Final   • AST (SGOT) 12/28/2018 48  17 - 59 U/L Final   • Alkaline Phosphatase 12/28/2018 86  38 - 126 U/L Final   • Total Bilirubin 12/28/2018 0.7  0.2 - 1.3 mg/dL Final   • eGFR Non African Amer 12/28/2018 54  42 - 98 mL/min/1.73 Final   • Globulin 12/28/2018 3.1  2.3 - 3.5 gm/dL Final   • A/G Ratio 12/28/2018 1.3  1.1 - 1.8 g/dL Final   • BUN/Creatinine Ratio 12/28/2018 19.8  7.0 - 25.0 Final   • Anion Gap 12/28/2018 7.0  5.0 - 15.0 mmol/L Final   • Protime 12/28/2018 12.9  11.1 - 15.3 Seconds Final   • INR 12/28/2018 0.99  0.80 - 1.20 Final   • PTT 12/28/2018 24.8  20.0 - 40.3 seconds Final   • D-Dimer, Quantitative 12/28/2018 >4,000* 0 - 470 ng/mL (FEU) Final   • WBC 12/28/2018 11.02* 3.20 - 9.80 10*3/mm3 Final   • RBC 12/28/2018 3.79* 4.37 - 5.74 10*6/mm3 Final   • Hemoglobin 12/28/2018 12.6* 13.7 - 17.3 g/dL Final   • Hematocrit 12/28/2018 37.3* 39.0 - 49.0 % Final   • MCV 12/28/2018 98.4* 80.0 - 98.0 fL Final   • MCH 12/28/2018 33.2  26.5 - 34.0 pg Final   • MCHC 12/28/2018 33.8  31.5 - 36.3  g/dL Final   • RDW 12/28/2018 14.1  11.5 - 14.5 % Final   • RDW-SD 12/28/2018 50.3* 35.1 - 43.9 fl Final   • MPV 12/28/2018 9.1  8.0 - 12.0 fL Final   • Platelets 12/28/2018 167  150 - 450 10*3/mm3 Final   • Neutrophil % 12/28/2018 58.9  37.0 - 80.0 % Final   • Lymphocyte % 12/28/2018 29.9  10.0 - 50.0 % Final   • Monocyte % 12/28/2018 7.8  0.0 - 12.0 % Final   • Eosinophil % 12/28/2018 2.9  0.0 - 7.0 % Final   • Basophil % 12/28/2018 0.1  0.0 - 2.0 % Final   • Immature Grans % 12/28/2018 0.4  0.0 - 0.5 % Final   • Neutrophils, Absolute 12/28/2018 6.50  2.00 - 8.60 10*3/mm3 Final   • Lymphocytes, Absolute 12/28/2018 3.29  0.60 - 4.20 10*3/mm3 Final   • Monocytes, Absolute 12/28/2018 0.86  0.00 - 0.90 10*3/mm3 Final   • Eosinophils, Absolute 12/28/2018 0.32  0.00 - 0.70 10*3/mm3 Final   • Basophils, Absolute 12/28/2018 0.01  0.00 - 0.20 10*3/mm3 Final   • Immature Grans, Absolute 12/28/2018 0.04* 0.00 - 0.02 10*3/mm3 Final   • Extra Tube 12/28/2018 Hold for add-ons.   Final    Auto resulted.   • Troponin I 12/28/2018 <0.012  <=0.034 ng/mL Final   • proBNP 12/28/2018 235.0  0.0 - 900.0 pg/mL Final   • BSA 12/29/2018 2.2  m^2 Final   • BH CV ECHO JACQUELINE - RVDD 12/29/2018 3.4  cm Final   • IVSd 12/29/2018 1.2  cm Final   • LVIDd 12/29/2018 4.2  cm Final   • LVIDs 12/29/2018 2.4  cm Final   • LVPWd 12/29/2018 1.1  cm Final   • IVS/LVPW 12/29/2018 1.0   Final   • FS 12/29/2018 42.7  % Final   • EDV(Teich) 12/29/2018 78.1  ml Final   • ESV(Teich) 12/29/2018 20.2  ml Final   • EF(Teich) 12/29/2018 74.2  % Final   • EDV(cubed) 12/29/2018 73.6  ml Final   • ESV(cubed) 12/29/2018 13.8  ml Final   • EF(cubed) 12/29/2018 81.2  % Final   • LV mass(C)d 12/29/2018 164.7  grams Final   • LV mass(C)dI 12/29/2018 73.9  grams/m^2 Final   • SV(Teich) 12/29/2018 58.0  ml Final   • SI(Teich) 12/29/2018 26.0  ml/m^2 Final   • SV(cubed) 12/29/2018 59.7  ml Final   • SI(cubed) 12/29/2018 26.8  ml/m^2 Final   • Ao root diam 12/29/2018 3.2  cm  Final   • Ao root area 12/29/2018 8.0  cm^2 Final   • ACS 12/29/2018 2.1  cm Final   • LA dimension 12/29/2018 3.6  cm Final   • asc Aorta Diam 12/29/2018 3.3  cm Final   • LA/Ao 12/29/2018 1.1   Final   • LVOT diam 12/29/2018 2.3  cm Final   • LVOT area 12/29/2018 4.2  cm^2 Final   • LVOT area(traced) 12/29/2018 4.2  cm^2 Final   • Ao root area (BSA corrected) 12/29/2018 1.4   Final   • MV E max monico 12/29/2018 118.0  cm/sec Final   • MV A max monico 12/29/2018 85.4  cm/sec Final   • MV E/A 12/29/2018 1.4   Final   • MV P1/2t max monico 12/29/2018 123.0  cm/sec Final   • MV P1/2t 12/29/2018 66.7  msec Final   • MVA(P1/2t) 12/29/2018 3.3  cm^2 Final   • MV dec slope 12/29/2018 540.0  cm/sec^2 Final   • Ao pk monico 12/29/2018 133.0  cm/sec Final   • Ao max PG 12/29/2018 7.1  mmHg Final   • Ao max PG (full) 12/29/2018 -0.32  mmHg Final   • Ao V2 mean 12/29/2018 94.9  cm/sec Final   • Ao mean PG 12/29/2018 4.0  mmHg Final   • Ao mean PG (full) 12/29/2018 1.0  mmHg Final   • Ao V2 VTI 12/29/2018 28.0  cm Final   • MAMIE(I,A) 12/29/2018 3.9  cm^2 Final   • MAMIE(I,D) 12/29/2018 3.9  cm^2 Final   • MAMIE(V,A) 12/29/2018 4.2  cm^2 Final   • MAMIE(V,D) 12/29/2018 4.2  cm^2 Final   • LV V1 max PG 12/29/2018 7.4  mmHg Final   • LV V1 mean PG 12/29/2018 3.0  mmHg Final   • LV V1 max 12/29/2018 136.0  cm/sec Final   • LV V1 mean 12/29/2018 83.3  cm/sec Final   • LV V1 VTI 12/29/2018 26.1  cm Final   • MR max monico 12/29/2018 372.0  cm/sec Final   • MR max PG 12/29/2018 55.4  mmHg Final   • SV(Ao) 12/29/2018 225.2  ml Final   • SI(Ao) 12/29/2018 101.1  ml/m^2 Final   • SV(LVOT) 12/29/2018 108.4  ml Final   • SI(LVOT) 12/29/2018 48.7  ml/m^2 Final   • PA V2 max 12/29/2018 111.0  cm/sec Final   • PA max PG 12/29/2018 4.9  mmHg Final   • TR max monico 12/29/2018 212.0  cm/sec Final   • RVSP(TR) 12/29/2018 23.0  mmHg Final   • RAP systole 12/29/2018 5.0  mmHg Final   • Pulm Sys Monico 12/29/2018 71.9  cm/sec Final   • Pulm Wong Monico 12/29/2018 47.5  cm/sec  Final   • Pulm S/D 12/29/2018 1.5   Final   • Pulm A Revs Dur 12/29/2018 0.14  sec Final   • Pulm A Revs Monico 12/29/2018 30.5  cm/sec Final   • MVA P1/2T LCG 12/29/2018 1.8  cm^2 Final   • BH CV ECHO JACQUELINE - BZI_BMI 12/29/2018 37.3  kilograms/m^2 Final   • BH CV ECHO JACQUELINE - BSA(HAYCOCK) 12/29/2018 2.4  m^2 Final   • BH CV ECHO JACQUELINE - BZI_METRIC_WEIGHT 12/29/2018 111.1  kg Final   • BH CV ECHO JACQUELINE - BZI_METRIC_HEIGHT 12/29/2018 172.7  cm Final   • TAPSE (>1.6) 12/29/2018 2.20  cm2 Final   • Magnesium 12/28/2018 1.6  1.6 - 2.3 mg/dL Final   • Glucose 12/29/2018 119* 60 - 100 mg/dL Final   • BUN 12/29/2018 26* 7 - 21 mg/dL Final   • Creatinine 12/29/2018 1.38* 0.70 - 1.30 mg/dL Final   • Sodium 12/29/2018 137  137 - 145 mmol/L Final   • Potassium 12/29/2018 4.3  3.5 - 5.1 mmol/L Final   • Chloride 12/29/2018 99  95 - 110 mmol/L Final   • CO2 12/29/2018 31.0  22.0 - 31.0 mmol/L Final   • Calcium 12/29/2018 8.6  8.4 - 10.2 mg/dL Final   • eGFR Non African Amer 12/29/2018 51  42 - 98 mL/min/1.73 Final   • BUN/Creatinine Ratio 12/29/2018 18.8  7.0 - 25.0 Final   • Anion Gap 12/29/2018 7.0  5.0 - 15.0 mmol/L Final   • Uric Acid 12/29/2018 5.3  2.5 - 8.5 mg/dL Final   • Iron 12/29/2018 22* 49 - 181 mcg/dL Final   • TIBC 12/29/2018 285  261 - 462 mcg/dL Final   • Iron Saturation 12/29/2018 8* 20 - 55 % Final   • Ferritin 12/29/2018 154.00  17.90 - 464.00 ng/mL Final   • Vitamin B-12 12/29/2018 311  239 - 931 pg/mL Final   • Folate 12/29/2018 14.70  2.76 - 21.00 ng/mL Final   • WBC 12/29/2018 8.77  3.20 - 9.80 10*3/mm3 Final   • RBC 12/29/2018 3.62* 4.37 - 5.74 10*6/mm3 Final   • Hemoglobin 12/29/2018 12.2* 13.7 - 17.3 g/dL Final   • Hematocrit 12/29/2018 35.7* 39.0 - 49.0 % Final   • MCV 12/29/2018 98.6* 80.0 - 98.0 fL Final   • MCH 12/29/2018 33.7  26.5 - 34.0 pg Final   • MCHC 12/29/2018 34.2  31.5 - 36.3 g/dL Final   • RDW 12/29/2018 14.2  11.5 - 14.5 % Final   • RDW-SD 12/29/2018 51.2* 35.1 - 43.9 fl Final   • MPV  12/29/2018 9.1  8.0 - 12.0 fL Final   • Platelets 12/29/2018 168  150 - 450 10*3/mm3 Final   • Neutrophil % 12/29/2018 54.5  37.0 - 80.0 % Final   • Lymphocyte % 12/29/2018 31.8  10.0 - 50.0 % Final   • Monocyte % 12/29/2018 9.0  0.0 - 12.0 % Final   • Eosinophil % 12/29/2018 4.3  0.0 - 7.0 % Final   • Basophil % 12/29/2018 0.1  0.0 - 2.0 % Final   • Immature Grans % 12/29/2018 0.3  0.0 - 0.5 % Final   • Neutrophils, Absolute 12/29/2018 4.77  2.00 - 8.60 10*3/mm3 Final   • Lymphocytes, Absolute 12/29/2018 2.79  0.60 - 4.20 10*3/mm3 Final   • Monocytes, Absolute 12/29/2018 0.79  0.00 - 0.90 10*3/mm3 Final   • Eosinophils, Absolute 12/29/2018 0.38  0.00 - 0.70 10*3/mm3 Final   • Basophils, Absolute 12/29/2018 0.01  0.00 - 0.20 10*3/mm3 Final   • Immature Grans, Absolute 12/29/2018 0.03* 0.00 - 0.02 10*3/mm3 Final   Admission on 11/26/2018, Discharged on 11/26/2018   Component Date Value Ref Range Status   • Case Report 11/26/2018    Final                    Value:Surgical Pathology Report                         Case: [id number]                                  Authorizing Provider:  Ger Thomas MD      Collected:           11/26/2018 10:55 AM          Ordering Location:     [facility]             Received:            11/26/2018 01:07 PM                                 [facility]                                                     Pathologist:           Daniel Marshall MD                                                         Specimen:    Large Intestine, Sigmoid Colon, rectosigmoid colon polyp                                  • Final Diagnosis 11/26/2018    Final                    Value:This result contains rich text formatting which cannot be displayed here.   • Gross Description 11/26/2018    Final                    Value:This result contains rich text formatting which cannot be displayed here.   ]

## 2019-01-04 ENCOUNTER — READMISSION MANAGEMENT (OUTPATIENT)
Dept: CALL CENTER | Facility: HOSPITAL | Age: 74
End: 2019-01-04

## 2019-01-04 NOTE — OUTREACH NOTE
Medical Week 1 Survey      Responses   Facility patient discharged from?  [place]   Does the patient have one of the following disease processes/diagnoses(primary or secondary)?  Other   Is there a successful TCM telephone encounter documented?  No   Week 1 attempt successful?  No   Unsuccessful attempts  Attempt 2          Joanne Duran RN

## 2019-01-07 ENCOUNTER — READMISSION MANAGEMENT (OUTPATIENT)
Dept: CALL CENTER | Facility: HOSPITAL | Age: 74
End: 2019-01-07

## 2019-01-07 NOTE — OUTREACH NOTE
Medical Week 2 Survey      Responses   Facility patient discharged from?  [facility]   Does the patient have one of the following disease processes/diagnoses(primary or secondary)?  Other   Week 2 attempt successful?  No   Unsuccessful attempts  Attempt 1          Rani Cuenca RN

## 2019-01-08 ENCOUNTER — READMISSION MANAGEMENT (OUTPATIENT)
Dept: CALL CENTER | Facility: HOSPITAL | Age: 74
End: 2019-01-08

## 2019-01-08 DIAGNOSIS — R60.9 PERIPHERAL EDEMA: Chronic | ICD-10-CM

## 2019-01-08 RX ORDER — FUROSEMIDE 20 MG/1
20 TABLET ORAL DAILY
Qty: 90 TABLET | Refills: 1 | OUTPATIENT
Start: 2019-01-08

## 2019-01-08 NOTE — OUTREACH NOTE
Medical Week 2 Survey      Responses   Facility patient discharged from?  [facility]   Does the patient have one of the following disease processes/diagnoses(primary or secondary)?  Other   Week 2 attempt successful?  No   Unsuccessful attempts  Attempt 2          Pearl Aaron RN

## 2019-01-17 NOTE — DISCHARGE SUMMARY
FAMILY MEDICINE SERVICE   HOSPITAL DISCHARGE SUMMARY    PATIENT NAME: Noe Barnes Sr.   PHYSICIAN: Eddie Rodriguez Jr, MD   : 1945  MRN: [id number]    ADMITTED: 2018  DISCHARGED: 2018       ADMISSION DIAGNOSES:  Active Hospital Problems    Diagnosis Date Noted   • **Other pulmonary embolism without acute cor pulmonale (CMS/HCC) [I26.99] 2018   • Acute deep vein thrombosis (DVT) of femoral vein of left lower extremity (CMS/HCC) [I82.412] 2018   • CKD (chronic kidney disease) stage 3, GFR 30-59 ml/min (CMS/HCC) [N18.3] 2018   • Benign prostatic hyperplasia without lower urinary tract symptoms [N40.0] 2018   • (HFpEF) heart failure with preserved ejection fraction (CMS/HCC) [I50.30] 2018   • 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] 2017   • Mixed hyperlipidemia [E78.2]    • Iron deficiency anemia due to dietary causes [D50.8]       Resolved Hospital Problems    Diagnosis Date Noted Date Resolved   • Leukocytosis [D72.829] 2018     DISCHARGE DIAGNOSES:   Active Hospital Problems    Diagnosis Date Noted   • **Other pulmonary embolism without acute cor pulmonale (CMS/HCC) [I26.99] 2018   • Acute deep vein thrombosis (DVT) of femoral vein of left lower extremity (CMS/HCC) [I82.412] 2018   • CKD (chronic kidney disease) stage 3, GFR 30-59 ml/min (CMS/HCC) [N18.3] 2018   • Benign prostatic hyperplasia without lower urinary tract symptoms [N40.0] 2018   • (HFpEF) heart failure with preserved ejection fraction (CMS/HCC) [I50.30] 2018   • 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] 2017   • Mixed hyperlipidemia [E78.2]    • Iron deficiency anemia due to dietary causes [D50.8]       Resolved Hospital Problems    Diagnosis Date Noted Date Resolved   • Leukocytosis [D72.829] 2018       SERVICE: Family Medicine. Attending Dr. Maicol Roca, Resident  Eddie Rodriguez Jr, MD    CONSULTS:   Consult Orders (all) (From admission, onward)    Start     Ordered    12/28/18 2159  Inpatient Advance Care Planning Consult  Once,   Status:  Canceled     Comments:  And assitance with / completion of advance directive   Provider:  (Not yet assigned)    12/28/18 2159 12/28/18 2030  Inpatient Case Management  Consult  Once     Provider:  (Not yet assigned)    12/28/18 2029 12/28/18 1922  Family Practice - Resident (on-call MD unless specified)  Once,   Status:  Canceled     Specialty:  Family Medicine  Provider:  (Not yet assigned)    12/28/18 1921          PROCEDURES:   None    HISTORY OF PRESENT ILLNESS (from admission H&P):    Noe Barnes Sr. is a 73 y.o. male with a CMH of CKD stage 3, HFpEF EF 50-55%, hyperlipidemia, hypertension, who presents complaining of shortness of air. Patient reports falling one week ago in the shower has having persistent left shoulder pain. Three to four days later he began experiencing pain behind his left knee. Pain is 6/10, throbbing, localized to posterior knee, non-radiating, can flare up to a 10/10. No known aggravating factors. Alleviated by pain medication. Onset associated with shortness of air. Pain today was severe enough to go to urgent care at [facility] for evaluation. There they found he had a DVT and sent him to the hospital. Patient is a poor historian and is unaware of heart failure. Family report he lives a sedentary lifestyle and sits in a chair for over 6 hours a day. He reports chills. He denies recent travel, chest pain, diaphoresis, nausea, vomiting, diarrhea.        DIAGNOSTIC DATA:   Lab Results   Component Value Date    WBC 8.77 12/29/2018    HGB 12.2 (L) 12/29/2018    HCT 35.7 (L) 12/29/2018    MCV 98.6 (H) 12/29/2018     12/29/2018     Lab Results   Component Value Date    GLUCOSE 119 (H) 12/29/2018    BUN 26 (H) 12/29/2018    CREATININE 1.38 (H) 12/29/2018    EGFRIFNONA 51 12/29/2018     BCR 18.8 12/29/2018    K 4.3 12/29/2018    CO2 31.0 12/29/2018    CALCIUM 8.6 12/29/2018    PROTENTOTREF 7.3 01/24/2017    ALBUMIN 4.10 12/28/2018    LABIL2 1.1 01/24/2017    AST 48 12/28/2018    ALT 31 12/28/2018          HOSPITAL COURSE:  Patient presented to [facility] ED for DVT was admitted and brought to the floors.   On the floor patient was placed on telemetry and monitored overnight.  Given DVT patient was placed on NOAC for anticoagulation.  An ECHO was performed while inpatient and patient was found to have a normal EF of greater than 55%.  Patient has been referred to coumadin clinic for ongoing management of NOAC.  On hospital day #2 patient was deemed stable for discharge home with close follow-up to PCP and anti-coagulation clinic.      DISCHARGE CONDITION:   Stable    DISPOSITION:  Home or Self Care [1]  home    DISCHARGE MEDICATIONS     Discharge Medications      New Medications      Instructions Start Date   apixaban 5 MG tablet tablet  Commonly known as:  ELIQUIS  Notes to patient:  01/05/2019 @ 9am   5 mg, Oral, Every 12 Hours Scheduled      B-12 1000 MCG capsule  Notes to patient:  12/30/2018 @ 9am   1 capsule, Oral, Daily         Continue These Medications      Instructions Start Date   allopurinol 300 MG tablet  Commonly known as:  ZYLOPRIM  Notes to patient:  12/30/2018 @ 9am   300 mg, Oral, Daily      ferrous sulfate 325 (65 FE) MG tablet  Notes to patient:  12/30/2018 @ 9am   325 mg, Oral, Daily With Breakfast      finasteride 5 MG tablet  Commonly known as:  PROSCAR  Notes to patient:  12/30/2018 @ 9am   1 tablet, Oral, Daily      fluticasone 50 MCG/ACT nasal spray  Commonly known as:  FLONASE  Notes to patient:  As needed    2 sprays, Nasal, Daily PRN      furosemide 20 MG tablet  Commonly known as:  LASIX  Notes to patient:  12/30/2018 @ 9am   20 mg, Oral, Daily      hydrALAZINE 50 MG tablet  Commonly known as:  APRESOLINE  Notes to patient:  12/29/2018 @ 9pm   TAKE 1 TABLET BY MOUTH TWO TIMES  A DAY      isosorbide mononitrate 60 MG 24 hr tablet  Commonly known as:  IMDUR  Notes to patient:  12/30/2018 @ 9am   60 mg, Oral, Daily      losartan 100 MG tablet  Commonly known as:  COZAAR  Notes to patient:  12/30/2018 @ 9am   100 mg, Oral, Daily, To replace Losartan with HCTZ      magnesium oxide 400 (241.3 Mg) MG tablet tablet  Commonly known as:  MAGOX  Notes to patient:  12/30/2018 @ 9am   400 mg, Oral, Daily      metoprolol succinate XL 50 MG 24 hr tablet  Commonly known as:  TOPROL-XL  Notes to patient:  12/29/2018 @ 9pm   50 mg, Oral, 2 Times Daily      nystatin [id number] UNIT/GM ointment  Commonly known as:  MYCOSTATIN  Notes to patient:  12/29/2018 @ 9pm   Topical, 3 Times Daily      pantoprazole 40 MG EC tablet  Commonly known as:  PROTONIX  Notes to patient:  12/30/2018 @ 9am   40 mg, Oral, Daily      pravastatin 20 MG tablet  Commonly known as:  PRAVACHOL  Notes to patient:  12/29/2018 @ 9pm   20 mg, Oral, Nightly      spironolactone 25 MG tablet  Commonly known as:  ALDACTONE  Notes to patient:  12/30/2018 @ 9am   25 mg, Oral, Daily      tolnaftate 1 % cream  Commonly known as:  TINACTIN  Notes to patient:  12/29/2018 @ 9pm   1 application, Topical, 2 Times Daily             INSTRUCTIONS:  Activity:   Activity Instructions     Activity as Tolerated          Diet:   Diet Instructions     Diet: Regular      Discharge Diet:  Regular        Special instructions: Patient instructed to call MD or return to ED with worsening shortness of breath, chest pain, fever greater than 100.4 degrees F or any other medical concerns..    FOLLOW UP:   Additional Instructions for the Follow-ups that You Need to Schedule     Discharge Follow-up with PCP   As directed       Currently Documented PCP:    Elia Herron MD    PCP Phone Number:    [phone]     Follow Up Details:  1 week           Follow-up Information     Elia Herron MD Follow up.    Specialty:  Family Medicine  Why:  1 week. Please call Monday  12/31/18 and make a 1 week hospital follow up appointment.  Contact information:  [address] [place] [address] [address] [place] [address] [phone]                   PENDING TEST RESULTS AT DISCHARGE      Time: Discharge 37 min    Dr. Maicol Roca is the attending at time of discharge, is aware of the patient's status and agrees with the above discharge summary.        Eddie Rodriguez Jr., M.D.  PGY2  Family Medicine Resident  [address] [place], [place] [id number]  Phone: [phone]  Fax: [phone]      This document has been electronically signed by Eddie Rodriguez Jr, MD on 01/16/19 8:14 PM

## 2019-01-28 DIAGNOSIS — M1A.0710 IDIOPATHIC CHRONIC GOUT OF RIGHT FOOT WITHOUT TOPHUS: Chronic | ICD-10-CM

## 2019-01-28 RX ORDER — ALLOPURINOL 300 MG/1
300 TABLET ORAL DAILY
Qty: 90 TABLET | Refills: 1 | OUTPATIENT
Start: 2019-01-28

## 2019-02-18 DIAGNOSIS — I10 ESSENTIAL HYPERTENSION: Chronic | ICD-10-CM

## 2019-02-18 RX ORDER — METOPROLOL SUCCINATE 50 MG/1
TABLET, EXTENDED RELEASE ORAL
Qty: 60 TABLET | Refills: 4 | Status: SHIPPED | OUTPATIENT
Start: 2019-02-18 | End: 2019-03-18 | Stop reason: SDUPTHER

## 2019-02-21 ENCOUNTER — TRANSCRIBE ORDERS (OUTPATIENT)
Dept: GENERAL RADIOLOGY | Facility: CLINIC | Age: 74
End: 2019-02-21

## 2019-02-21 ENCOUNTER — LAB (OUTPATIENT)
Dept: LAB | Facility: OTHER | Age: 74
End: 2019-02-21

## 2019-02-21 DIAGNOSIS — I12.9 HYPERTENSIVE NEPHROPATHY: ICD-10-CM

## 2019-02-21 DIAGNOSIS — E79.0 HYPERURICEMIA: ICD-10-CM

## 2019-02-21 DIAGNOSIS — E78.5 HYPERLIPIDEMIA, UNSPECIFIED HYPERLIPIDEMIA TYPE: ICD-10-CM

## 2019-02-21 DIAGNOSIS — N18.30 CHRONIC KIDNEY DISEASE, STAGE III (MODERATE) (HCC): Primary | ICD-10-CM

## 2019-02-21 DIAGNOSIS — N39.0 URINARY TRACT INFECTION WITHOUT HEMATURIA, SITE UNSPECIFIED: ICD-10-CM

## 2019-02-21 DIAGNOSIS — D64.9 ANEMIA, UNSPECIFIED TYPE: ICD-10-CM

## 2019-02-21 DIAGNOSIS — N18.30 CHRONIC KIDNEY DISEASE, STAGE III (MODERATE) (HCC): ICD-10-CM

## 2019-02-21 DIAGNOSIS — E83.42 HYPOMAGNESEMIA: ICD-10-CM

## 2019-02-21 DIAGNOSIS — N18.9 CHRONIC KIDNEY DISEASE, UNSPECIFIED CKD STAGE: ICD-10-CM

## 2019-02-21 DIAGNOSIS — I10 HYPERTENSION, ESSENTIAL: ICD-10-CM

## 2019-02-21 LAB
ALBUMIN SERPL-MCNC: 4.1 G/DL (ref 3.5–5)
ANION GAP SERPL CALCULATED.3IONS-SCNC: 6 MMOL/L (ref 5–15)
BASOPHILS # BLD AUTO: 0.01 10*3/MM3 (ref 0–0.2)
BASOPHILS NFR BLD AUTO: 0.1 % (ref 0–2)
BUN BLD-MCNC: 29 MG/DL (ref 9–20)
BUN/CREAT SERPL: 21.8 (ref 7–25)
CALCIUM SPEC-SCNC: 9 MG/DL (ref 8.4–10.2)
CHLORIDE SERPL-SCNC: 101 MMOL/L (ref 98–107)
CO2 SERPL-SCNC: 34 MMOL/L (ref 22–30)
CREAT BLD-MCNC: 1.33 MG/DL (ref 0.66–1.25)
CREAT UR-MCNC: 137 MG/DL
DEPRECATED RDW RBC AUTO: 56.7 FL (ref 35.1–43.9)
EOSINOPHIL # BLD AUTO: 0.3 10*3/MM3 (ref 0–0.7)
EOSINOPHIL NFR BLD AUTO: 3.8 % (ref 0–7)
ERYTHROCYTE [DISTWIDTH] IN BLOOD BY AUTOMATED COUNT: 15.2 % (ref 11.5–14.5)
GFR SERPL CREATININE-BSD FRML MDRD: 53 ML/MIN/1.73 (ref 42–98)
GLUCOSE BLD-MCNC: 109 MG/DL (ref 74–99)
HCT VFR BLD AUTO: 36.8 % (ref 39–49)
HGB BLD-MCNC: 11.8 G/DL (ref 13.7–17.3)
LYMPHOCYTES # BLD AUTO: 3.11 10*3/MM3 (ref 0.6–4.2)
LYMPHOCYTES NFR BLD AUTO: 39.9 % (ref 10–50)
MCH RBC QN AUTO: 33 PG (ref 26.5–34)
MCHC RBC AUTO-ENTMCNC: 32.1 G/DL (ref 31.5–36.3)
MCV RBC AUTO: 102.8 FL (ref 80–98)
MONOCYTES # BLD AUTO: 0.6 10*3/MM3 (ref 0–0.9)
MONOCYTES NFR BLD AUTO: 7.7 % (ref 0–12)
NEUTROPHILS # BLD AUTO: 3.78 10*3/MM3 (ref 2–8.6)
NEUTROPHILS NFR BLD AUTO: 48.5 % (ref 37–80)
PHOSPHATE SERPL-MCNC: 3.9 MG/DL (ref 2.5–4.5)
PLATELET # BLD AUTO: 218 10*3/MM3 (ref 150–450)
PMV BLD AUTO: 9.1 FL (ref 8–12)
POTASSIUM BLD-SCNC: 4.3 MMOL/L (ref 3.4–5)
PROT UR-MCNC: 8.1 MG/DL
PROT/CREAT UR: 59.1 MG/G CREA (ref 0–200)
RBC # BLD AUTO: 3.58 10*6/MM3 (ref 4.37–5.74)
SODIUM BLD-SCNC: 141 MMOL/L (ref 137–145)
URATE SERPL-MCNC: 5.8 MG/DL (ref 3.5–8.5)
WBC NRBC COR # BLD: 7.8 10*3/MM3 (ref 3.2–9.8)

## 2019-02-21 PROCEDURE — 80069 RENAL FUNCTION PANEL: CPT | Performed by: INTERNAL MEDICINE

## 2019-02-21 PROCEDURE — 84550 ASSAY OF BLOOD/URIC ACID: CPT | Performed by: INTERNAL MEDICINE

## 2019-02-21 PROCEDURE — 85025 COMPLETE CBC W/AUTO DIFF WBC: CPT | Performed by: INTERNAL MEDICINE

## 2019-02-21 PROCEDURE — 36415 COLL VENOUS BLD VENIPUNCTURE: CPT | Performed by: INTERNAL MEDICINE

## 2019-02-21 PROCEDURE — 84156 ASSAY OF PROTEIN URINE: CPT | Performed by: INTERNAL MEDICINE

## 2019-02-21 PROCEDURE — 82570 ASSAY OF URINE CREATININE: CPT | Performed by: INTERNAL MEDICINE

## 2019-03-11 ENCOUNTER — LAB (OUTPATIENT)
Dept: LAB | Facility: OTHER | Age: 74
End: 2019-03-11

## 2019-03-11 DIAGNOSIS — D50.8 IRON DEFICIENCY ANEMIA DUE TO DIETARY CAUSES: ICD-10-CM

## 2019-03-11 DIAGNOSIS — R73.02 IMPAIRED GLUCOSE TOLERANCE: Chronic | ICD-10-CM

## 2019-03-11 DIAGNOSIS — I10 ESSENTIAL HYPERTENSION: ICD-10-CM

## 2019-03-11 DIAGNOSIS — E78.2 MIXED HYPERLIPIDEMIA: Chronic | ICD-10-CM

## 2019-03-11 LAB
ALBUMIN SERPL-MCNC: 4.2 G/DL (ref 3.5–5)
ALBUMIN/GLOB SERPL: 1.4 G/DL (ref 1.1–1.8)
ALP SERPL-CCNC: 84 U/L (ref 38–126)
ALT SERPL W P-5'-P-CCNC: 51 U/L
ANION GAP SERPL CALCULATED.3IONS-SCNC: 9 MMOL/L (ref 5–15)
AST SERPL-CCNC: 54 U/L (ref 17–59)
BACTERIA UR QL AUTO: ABNORMAL /HPF
BASOPHILS # BLD AUTO: 0.02 10*3/MM3 (ref 0–0.2)
BASOPHILS NFR BLD AUTO: 0.2 % (ref 0–2)
BILIRUB SERPL-MCNC: 1.1 MG/DL (ref 0.2–1.3)
BILIRUB UR QL STRIP: NEGATIVE
BUN BLD-MCNC: 29 MG/DL (ref 9–20)
BUN/CREAT SERPL: 20.6 (ref 7–25)
CALCIUM SPEC-SCNC: 9 MG/DL (ref 8.4–10.2)
CHLORIDE SERPL-SCNC: 99 MMOL/L (ref 98–107)
CHOLEST SERPL-MCNC: 152 MG/DL (ref 150–200)
CLARITY UR: ABNORMAL
CO2 SERPL-SCNC: 33 MMOL/L (ref 22–30)
COLOR UR: ABNORMAL
CREAT BLD-MCNC: 1.41 MG/DL (ref 0.66–1.25)
DEPRECATED RDW RBC AUTO: 56.9 FL (ref 35.1–43.9)
EOSINOPHIL # BLD AUTO: 0.29 10*3/MM3 (ref 0–0.7)
EOSINOPHIL NFR BLD AUTO: 3.5 % (ref 0–7)
ERYTHROCYTE [DISTWIDTH] IN BLOOD BY AUTOMATED COUNT: 15.1 % (ref 11.5–14.5)
GFR SERPL CREATININE-BSD FRML MDRD: 50 ML/MIN/1.73 (ref 42–98)
GLOBULIN UR ELPH-MCNC: 3.1 GM/DL (ref 2.3–3.5)
GLUCOSE BLD-MCNC: 116 MG/DL (ref 74–99)
GLUCOSE UR STRIP-MCNC: NEGATIVE MG/DL
HBA1C MFR BLD: 6.2 % (ref 4–5.6)
HCT VFR BLD AUTO: 38.4 % (ref 39–49)
HDLC SERPL-MCNC: 36 MG/DL (ref 40–59)
HGB BLD-MCNC: 12.1 G/DL (ref 13.7–17.3)
HGB UR QL STRIP.AUTO: NEGATIVE
HYALINE CASTS UR QL AUTO: ABNORMAL /LPF
KETONES UR QL STRIP: NEGATIVE
LDLC SERPL CALC-MCNC: 59 MG/DL
LDLC/HDLC SERPL: 1.63 {RATIO} (ref 0–3.55)
LEUKOCYTE ESTERASE UR QL STRIP.AUTO: ABNORMAL
LYMPHOCYTES # BLD AUTO: 2.91 10*3/MM3 (ref 0.6–4.2)
LYMPHOCYTES NFR BLD AUTO: 35.3 % (ref 10–50)
MCH RBC QN AUTO: 33 PG (ref 26.5–34)
MCHC RBC AUTO-ENTMCNC: 31.5 G/DL (ref 31.5–36.3)
MCV RBC AUTO: 104.6 FL (ref 80–98)
MONOCYTES # BLD AUTO: 0.68 10*3/MM3 (ref 0–0.9)
MONOCYTES NFR BLD AUTO: 8.3 % (ref 0–12)
MUCOUS THREADS URNS QL MICRO: ABNORMAL /HPF
NEUTROPHILS # BLD AUTO: 4.34 10*3/MM3 (ref 2–8.6)
NEUTROPHILS NFR BLD AUTO: 52.7 % (ref 37–80)
NITRITE UR QL STRIP: NEGATIVE
PH UR STRIP.AUTO: 5.5 [PH] (ref 5.5–8)
PLATELET # BLD AUTO: 220 10*3/MM3 (ref 150–450)
PMV BLD AUTO: 9.1 FL (ref 8–12)
POTASSIUM BLD-SCNC: 4.4 MMOL/L (ref 3.4–5)
PROT SERPL-MCNC: 7.3 G/DL (ref 6.3–8.2)
PROT UR QL STRIP: NEGATIVE
RBC # BLD AUTO: 3.67 10*6/MM3 (ref 4.37–5.74)
RBC # UR: ABNORMAL /HPF
REF LAB TEST METHOD: ABNORMAL
SODIUM BLD-SCNC: 141 MMOL/L (ref 137–145)
SP GR UR STRIP: 1.02 (ref 1–1.03)
SQUAMOUS #/AREA URNS HPF: ABNORMAL /HPF
T4 FREE SERPL-MCNC: 1.13 NG/DL (ref 0.78–2.19)
TRIGL SERPL-MCNC: 287 MG/DL
TSH SERPL DL<=0.05 MIU/L-ACNC: 2.77 MIU/ML (ref 0.46–4.68)
UROBILINOGEN UR QL STRIP: ABNORMAL
VLDLC SERPL-MCNC: 57.4 MG/DL
WBC NRBC COR # BLD: 8.24 10*3/MM3 (ref 3.2–9.8)
WBC UR QL AUTO: ABNORMAL /HPF

## 2019-03-11 PROCEDURE — 85025 COMPLETE CBC W/AUTO DIFF WBC: CPT | Performed by: INTERNAL MEDICINE

## 2019-03-11 PROCEDURE — 84443 ASSAY THYROID STIM HORMONE: CPT | Performed by: INTERNAL MEDICINE

## 2019-03-11 PROCEDURE — 80053 COMPREHEN METABOLIC PANEL: CPT | Performed by: INTERNAL MEDICINE

## 2019-03-11 PROCEDURE — 81001 URINALYSIS AUTO W/SCOPE: CPT | Performed by: INTERNAL MEDICINE

## 2019-03-11 PROCEDURE — 87086 URINE CULTURE/COLONY COUNT: CPT | Performed by: INTERNAL MEDICINE

## 2019-03-11 PROCEDURE — 36415 COLL VENOUS BLD VENIPUNCTURE: CPT | Performed by: INTERNAL MEDICINE

## 2019-03-11 PROCEDURE — 80061 LIPID PANEL: CPT | Performed by: INTERNAL MEDICINE

## 2019-03-11 PROCEDURE — 84439 ASSAY OF FREE THYROXINE: CPT | Performed by: INTERNAL MEDICINE

## 2019-03-11 PROCEDURE — 83036 HEMOGLOBIN GLYCOSYLATED A1C: CPT | Performed by: INTERNAL MEDICINE

## 2019-03-12 LAB — BACTERIA SPEC AEROBE CULT: NORMAL

## 2019-03-18 ENCOUNTER — OFFICE VISIT (OUTPATIENT)
Dept: FAMILY MEDICINE CLINIC | Facility: CLINIC | Age: 74
End: 2019-03-18

## 2019-03-18 VITALS
WEIGHT: 256 LBS | HEIGHT: 68 IN | TEMPERATURE: 97 F | SYSTOLIC BLOOD PRESSURE: 118 MMHG | BODY MASS INDEX: 38.8 KG/M2 | DIASTOLIC BLOOD PRESSURE: 76 MMHG | HEART RATE: 72 BPM

## 2019-03-18 DIAGNOSIS — D50.8 IRON DEFICIENCY ANEMIA DUE TO DIETARY CAUSES: Chronic | ICD-10-CM

## 2019-03-18 DIAGNOSIS — K21.9 GASTROESOPHAGEAL REFLUX DISEASE WITHOUT ESOPHAGITIS: Chronic | ICD-10-CM

## 2019-03-18 DIAGNOSIS — N18.30 CKD (CHRONIC KIDNEY DISEASE) STAGE 3, GFR 30-59 ML/MIN (HCC): Chronic | ICD-10-CM

## 2019-03-18 DIAGNOSIS — N40.0 BENIGN PROSTATIC HYPERPLASIA WITHOUT LOWER URINARY TRACT SYMPTOMS: Chronic | ICD-10-CM

## 2019-03-18 DIAGNOSIS — I82.412 ACUTE DEEP VEIN THROMBOSIS (DVT) OF FEMORAL VEIN OF LEFT LOWER EXTREMITY (HCC): ICD-10-CM

## 2019-03-18 DIAGNOSIS — I10 ESSENTIAL HYPERTENSION: Chronic | ICD-10-CM

## 2019-03-18 DIAGNOSIS — E78.2 MIXED HYPERLIPIDEMIA: Chronic | ICD-10-CM

## 2019-03-18 DIAGNOSIS — R73.02 IMPAIRED GLUCOSE TOLERANCE: Primary | Chronic | ICD-10-CM

## 2019-03-18 PROBLEM — R68.83 CHILLS (WITHOUT FEVER): Status: ACTIVE | Noted: 2018-08-04

## 2019-03-18 PROBLEM — R00.2 PALPITATIONS: Status: ACTIVE | Noted: 2018-06-23

## 2019-03-18 PROCEDURE — 99214 OFFICE O/P EST MOD 30 MIN: CPT | Performed by: INTERNAL MEDICINE

## 2019-03-18 RX ORDER — TRIAMCINOLONE ACETONIDE 1 MG/G
CREAM TOPICAL 2 TIMES DAILY
Qty: 80 G | Refills: 2 | Status: SHIPPED | OUTPATIENT
Start: 2019-03-18 | End: 2022-01-03

## 2019-03-18 RX ORDER — DABIGATRAN ETEXILATE 150 MG/1
150 CAPSULE ORAL 2 TIMES DAILY
Qty: 60 CAPSULE | Refills: 5 | Status: SHIPPED | OUTPATIENT
Start: 2019-03-18 | End: 2019-08-15 | Stop reason: SDUPTHER

## 2019-03-18 NOTE — PROGRESS NOTES
Chief Complaint   Patient presents with   • Hypertension   • Hyperlipidemia   • Rash     x 3 months on chest, back, and left foot     Subjective   Noe Barnes Sr. is a 73 y.o. male who presents to the office for follow-up and review of labs. He has impaired glucose tolerance and has been monitoring his dietary intake of sugars and carbohydrates.  He has hypertension and his blood pressure has been controlled.  He has GERD which is well controlled with Protonix.    He has hyperlipidemia and takes pravastatin daily.  He has iron deficient anemia and takes an iron supplement daily.  He has renal insufficiency and follows with nephrology.  His baseline creatinine runs around 1.4.  He takes Lasix as needed for peripheral edema.    He has a recent history of a DVT and continues to take Eliquis.  He complains of a rash on his torso and lower extremities which has been present for the past few months.  This started around the time he began the Eliquis.  He thinks he may have an allergic reaction to the medication.  He is still having some swelling in the lower extremity related to the DVT.    The following portions of the patient's history were reviewed and updated as appropriate: allergies, current medications, past family history, past medical history, past social history, past surgical history and problem list.    Review of Systems   Constitutional: Negative for chills, fatigue and fever.   HENT: Negative for congestion, sneezing, sore throat and trouble swallowing.    Eyes: Negative for visual disturbance.   Respiratory: Negative for cough, chest tightness, shortness of breath and wheezing.    Cardiovascular: Positive for leg swelling. Negative for chest pain and palpitations.   Gastrointestinal: Negative for abdominal pain, constipation, diarrhea, nausea and vomiting.   Genitourinary: Negative for dysuria, frequency and urgency.   Musculoskeletal: Negative for neck pain.   Neurological: Negative for dizziness,  "weakness and headaches.   Psychiatric/Behavioral:        Patient denies any feelings of depression and has not felt down, hopeless or lost interest in any activities.   All other systems reviewed and are negative.      Objective   Vitals:    03/18/19 0920   BP: 118/76   BP Location: Left arm   Patient Position: Sitting   Pulse: 72   Temp: 97 °F (36.1 °C)   TempSrc: Oral   Weight: 116 kg (256 lb)   Height: 172.7 cm (68\")   PainSc: 0-No pain     Physical Exam   Constitutional: He is oriented to person, place, and time. He appears well-developed and well-nourished. No distress.   HENT:   Head: Normocephalic and atraumatic.   Nose: Nose normal.   Mouth/Throat: Oropharynx is clear and moist. No oropharyngeal exudate.   Eyes: Conjunctivae and EOM are normal. Pupils are equal, round, and reactive to light. No scleral icterus.   Neck: Normal range of motion. Neck supple.   Cardiovascular: Normal rate, regular rhythm and normal heart sounds. Exam reveals no gallop and no friction rub.   No murmur heard.  Pulmonary/Chest: Effort normal and breath sounds normal. No respiratory distress. He has no wheezes. He has no rales.   Abdominal: Soft. Bowel sounds are normal. He exhibits no distension. There is no tenderness. There is no rebound and no guarding.   Musculoskeletal: Normal range of motion. He exhibits no edema.   He has 1+ edema of the left foot.   Lymphadenopathy:     He has no cervical adenopathy.   Neurological: He is alert and oriented to person, place, and time. No cranial nerve deficit.   Skin: Skin is warm and dry. No rash noted.   Psychiatric: He has a normal mood and affect. His behavior is normal. Judgment and thought content normal.   Nursing note and vitals reviewed.      Assessment/Plan   Noe was seen today for hypertension, hyperlipidemia and rash.    Diagnoses and all orders for this visit:    Impaired glucose tolerance  -     Hemoglobin A1c; Future    Essential hypertension  -     Comprehensive Metabolic " Panel; Future  -     T4, Free; Future  -     TSH; Future  -     Urinalysis With Culture If Indicated - Urine, Clean Catch; Future    Mixed hyperlipidemia  -     LDL Cholesterol, Direct; Future  -     Triglycerides; Future    Gastroesophageal reflux disease without esophagitis    CKD (chronic kidney disease) stage 3, GFR 30-59 ml/min (CMS/HCC)    Benign prostatic hyperplasia without lower urinary tract symptoms    Iron deficiency anemia due to dietary causes  -     CBC & Differential; Future    Acute deep vein thrombosis (DVT) of femoral vein of left lower extremity (CMS/HCC)  -     dabigatran etexilate (PRADAXA) 150 MG capsu; Take 1 capsule by mouth 2 (Two) Times a Day.    Other orders  -     triamcinolone (KENALOG) 0.1 % cream; Apply  topically to the appropriate area as directed 2 (Two) Times a Day.         Labs are reviewed with patient.  His glucose is slightly elevated at 116.  Patient will continue to watch diet to help maintain control of the glucose.  Patient understands that there is an increased risk of developing diabetes in the future.  His triglycerides are elevated, but his other lipids are at goal.  He will continue with pravastatin for treatment of his hyperlipidemia.  His creatinine is elevated at 1.4.  He will continue to follow with nephrology.  He will continue with Protonix for treatment of GERD.  His blood pressure is controlled, and he will continue with his current blood pressure medication.  His anemia is stable, and He will continue to take the daily iron supplement.     Since the rash developed after he started Eliquis, I will change this to Pradaxa.  He is given triamcinolone cream to use topically on the rash to help with itching and skin irritation.  He will let me know if the rash does not resolve after switching medications.    PHQ-2/PHQ-9 Depression Screening 3/18/2019   Little interest or pleasure in doing things 3   Feeling down, depressed, or hopeless 0   Trouble falling or  staying asleep, or sleeping too much 0   Feeling tired or having little energy 0   Poor appetite or overeating 0   Feeling bad about yourself - or that you are a failure or have let yourself or your family down 0   Trouble concentrating on things, such as reading the newspaper or watching television 0   Moving or speaking so slowly that other people could have noticed. Or the opposite - being so fidgety or restless that you have been moving around a lot more than usual 0   Thoughts that you would be better off dead, or of hurting yourself in some way 0   Total Score 3   If you checked off any problems, how difficult have these problems made it for you to do your work, take care of things at home, or get along with other people? Not difficult at all         Lab on 03/11/2019   Component Date Value Ref Range Status   • Glucose 03/11/2019 116* 74 - 99 mg/dL Final   • BUN 03/11/2019 29* 9 - 20 mg/dL Final   • Creatinine 03/11/2019 1.41* 0.66 - 1.25 mg/dL Final   • Sodium 03/11/2019 141  137 - 145 mmol/L Final   • Potassium 03/11/2019 4.4  3.4 - 5.0 mmol/L Final   • Chloride 03/11/2019 99  98 - 107 mmol/L Final   • CO2 03/11/2019 33.0* 22.0 - 30.0 mmol/L Final   • Calcium 03/11/2019 9.0  8.4 - 10.2 mg/dL Final   • Total Protein 03/11/2019 7.3  6.3 - 8.2 g/dL Final   • Albumin 03/11/2019 4.20  3.50 - 5.00 g/dL Final   • ALT (SGPT) 03/11/2019 51* <=50 U/L Final   • AST (SGOT) 03/11/2019 54  17 - 59 U/L Final   • Alkaline Phosphatase 03/11/2019 84  38 - 126 U/L Final   • Total Bilirubin 03/11/2019 1.1  0.2 - 1.3 mg/dL Final   • eGFR Non African Amer 03/11/2019 50  42 - 98 mL/min/1.73 Final   • Globulin 03/11/2019 3.1  2.3 - 3.5 gm/dL Final   • A/G Ratio 03/11/2019 1.4  1.1 - 1.8 g/dL Final   • BUN/Creatinine Ratio 03/11/2019 20.6  7.0 - 25.0 Final   • Anion Gap 03/11/2019 9.0  5.0 - 15.0 mmol/L Final   • Hemoglobin A1C 03/11/2019 6.2* 4 - 5.6 % Final   • Total Cholesterol 03/11/2019 152  150 - 200 mg/dL Final   •  Triglycerides 03/11/2019 287* <=150 mg/dL Final   • HDL Cholesterol 03/11/2019 36* 40 - 59 mg/dL Final   • LDL Cholesterol  03/11/2019 59  <=100 mg/dL Final   • VLDL Cholesterol 03/11/2019 57.4  mg/dL Final   • LDL/HDL Ratio 03/11/2019 1.63  0.00 - 3.55 Final   • Free T4 03/11/2019 1.13  0.78 - 2.19 ng/dL Final   • TSH 03/11/2019 2.770  0.460 - 4.680 mIU/mL Final   • Color, UA 03/11/2019 Dark Yellow* Yellow, Straw Final   • Appearance, UA 03/11/2019 Cloudy* Clear Final   • pH, UA 03/11/2019 5.5  5.5 - 8.0 Final   • Specific Gravity, UA 03/11/2019 1.025  1.005 - 1.030 Final   • Glucose, UA 03/11/2019 Negative  Negative Final   • Ketones, UA 03/11/2019 Negative  Negative Final   • Bilirubin, UA 03/11/2019 Negative  Negative Final   • Blood, UA 03/11/2019 Negative  Negative Final   • Protein, UA 03/11/2019 Negative  Negative Final   • Leuk Esterase, UA 03/11/2019 Moderate (2+)* Negative Final   • Nitrite, UA 03/11/2019 Negative  Negative Final   • Urobilinogen, UA 03/11/2019 0.2 E.U./dL  0.2 - 1.0 E.U./dL Final   • WBC 03/11/2019 8.24  3.20 - 9.80 10*3/mm3 Final   • RBC 03/11/2019 3.67* 4.37 - 5.74 10*6/mm3 Final   • Hemoglobin 03/11/2019 12.1* 13.7 - 17.3 g/dL Final   • Hematocrit 03/11/2019 38.4* 39.0 - 49.0 % Final   • MCV 03/11/2019 104.6* 80.0 - 98.0 fL Final   • MCH 03/11/2019 33.0  26.5 - 34.0 pg Final   • MCHC 03/11/2019 31.5  31.5 - 36.3 g/dL Final   • RDW 03/11/2019 15.1* 11.5 - 14.5 % Final   • RDW-SD 03/11/2019 56.9* 35.1 - 43.9 fl Final   • MPV 03/11/2019 9.1  8.0 - 12.0 fL Final   • Platelets 03/11/2019 220  150 - 450 10*3/mm3 Final   • Neutrophil % 03/11/2019 52.7  37.0 - 80.0 % Final   • Lymphocyte % 03/11/2019 35.3  10.0 - 50.0 % Final   • Monocyte % 03/11/2019 8.3  0.0 - 12.0 % Final   • Eosinophil % 03/11/2019 3.5  0.0 - 7.0 % Final   • Basophil % 03/11/2019 0.2  0.0 - 2.0 % Final   • Neutrophils, Absolute 03/11/2019 4.34  2.00 - 8.60 10*3/mm3 Final   • Lymphocytes, Absolute 03/11/2019 2.91  0.60 -  4.20 10*3/mm3 Final   • Monocytes, Absolute 03/11/2019 0.68  0.00 - 0.90 10*3/mm3 Final   • Eosinophils, Absolute 03/11/2019 0.29  0.00 - 0.70 10*3/mm3 Final   • Basophils, Absolute 03/11/2019 0.02  0.00 - 0.20 10*3/mm3 Final   • RBC, UA 03/11/2019 0-2* None Seen /HPF Final   • WBC, UA 03/11/2019 13-20* None Seen /HPF Final   • Bacteria, UA 03/11/2019 Trace* None Seen /HPF Final   • Squamous Epithelial Cells, UA 03/11/2019 3-6* None Seen, 0-2 /HPF Final   • Hyaline Casts, UA 03/11/2019 None Seen  None Seen /LPF Final   • Mucus, UA 03/11/2019 Small/1+* None Seen, Trace /HPF Final   • Methodology 03/11/2019 Manual Light Microscopy   Final   • Urine Culture 03/11/2019 No growth at 24 hours   Final   Lab on 02/21/2019   Component Date Value Ref Range Status   • Glucose 02/21/2019 109* 74 - 99 mg/dL Final   • BUN 02/21/2019 29* 9 - 20 mg/dL Final   • Creatinine 02/21/2019 1.33* 0.66 - 1.25 mg/dL Final   • Sodium 02/21/2019 141  137 - 145 mmol/L Final   • Potassium 02/21/2019 4.3  3.4 - 5.0 mmol/L Final   • Chloride 02/21/2019 101  98 - 107 mmol/L Final   • CO2 02/21/2019 34.0* 22.0 - 30.0 mmol/L Final   • Calcium 02/21/2019 9.0  8.4 - 10.2 mg/dL Final   • Albumin 02/21/2019 4.10  3.50 - 5.00 g/dL Final   • Phosphorus 02/21/2019 3.9  2.5 - 4.5 mg/dL Final   • Anion Gap 02/21/2019 6.0  5.0 - 15.0 mmol/L Final   • BUN/Creatinine Ratio 02/21/2019 21.8  7.0 - 25.0 Final   • eGFR Non African Amer 02/21/2019 53  42 - 98 mL/min/1.73 Final   • Protein/Creatinine Ratio, Urine 02/21/2019 59.1  0.0 - 200.0 mg/G Crea Final   • Creatinine, Urine 02/21/2019 137.0  mg/dL Final   • Total Protein, Urine 02/21/2019 8.1  mg/dL Final   • Uric Acid 02/21/2019 5.8  3.5 - 8.5 mg/dL Final   • WBC 02/21/2019 7.80  3.20 - 9.80 10*3/mm3 Final   • RBC 02/21/2019 3.58* 4.37 - 5.74 10*6/mm3 Final   • Hemoglobin 02/21/2019 11.8* 13.7 - 17.3 g/dL Final   • Hematocrit 02/21/2019 36.8* 39.0 - 49.0 % Final   • MCV 02/21/2019 102.8* 80.0 - 98.0 fL Final   •  MCH 02/21/2019 33.0  26.5 - 34.0 pg Final   • MCHC 02/21/2019 32.1  31.5 - 36.3 g/dL Final   • RDW 02/21/2019 15.2* 11.5 - 14.5 % Final   • RDW-SD 02/21/2019 56.7* 35.1 - 43.9 fl Final   • MPV 02/21/2019 9.1  8.0 - 12.0 fL Final   • Platelets 02/21/2019 218  150 - 450 10*3/mm3 Final   • Neutrophil % 02/21/2019 48.5  37.0 - 80.0 % Final   • Lymphocyte % 02/21/2019 39.9  10.0 - 50.0 % Final   • Monocyte % 02/21/2019 7.7  0.0 - 12.0 % Final   • Eosinophil % 02/21/2019 3.8  0.0 - 7.0 % Final   • Basophil % 02/21/2019 0.1  0.0 - 2.0 % Final   • Neutrophils, Absolute 02/21/2019 3.78  2.00 - 8.60 10*3/mm3 Final   • Lymphocytes, Absolute 02/21/2019 3.11  0.60 - 4.20 10*3/mm3 Final   • Monocytes, Absolute 02/21/2019 0.60  0.00 - 0.90 10*3/mm3 Final   • Eosinophils, Absolute 02/21/2019 0.30  0.00 - 0.70 10*3/mm3 Final   • Basophils, Absolute 02/21/2019 0.01  0.00 - 0.20 10*3/mm3 Final   ]

## 2019-04-25 DIAGNOSIS — M1A.0710 IDIOPATHIC CHRONIC GOUT OF RIGHT FOOT WITHOUT TOPHUS: Chronic | ICD-10-CM

## 2019-04-25 RX ORDER — ALLOPURINOL 300 MG/1
300 TABLET ORAL DAILY
Qty: 90 TABLET | Refills: 1 | Status: SHIPPED | OUTPATIENT
Start: 2019-04-25 | End: 2019-07-24 | Stop reason: SDUPTHER

## 2019-05-09 ENCOUNTER — LAB (OUTPATIENT)
Dept: LAB | Facility: OTHER | Age: 74
End: 2019-05-09

## 2019-05-09 DIAGNOSIS — Z12.5 SCREENING FOR PROSTATE CANCER: ICD-10-CM

## 2019-05-09 LAB — PSA SERPL-MCNC: 0.19 NG/ML (ref 0–4)

## 2019-05-09 PROCEDURE — 36415 COLL VENOUS BLD VENIPUNCTURE: CPT | Performed by: INTERNAL MEDICINE

## 2019-05-09 PROCEDURE — G0103 PSA SCREENING: HCPCS | Performed by: INTERNAL MEDICINE

## 2019-05-31 ENCOUNTER — OFFICE VISIT (OUTPATIENT)
Dept: FAMILY MEDICINE CLINIC | Facility: CLINIC | Age: 74
End: 2019-05-31

## 2019-05-31 VITALS
HEIGHT: 68 IN | WEIGHT: 246 LBS | OXYGEN SATURATION: 96 % | DIASTOLIC BLOOD PRESSURE: 76 MMHG | HEART RATE: 74 BPM | BODY MASS INDEX: 37.28 KG/M2 | TEMPERATURE: 97.6 F | SYSTOLIC BLOOD PRESSURE: 130 MMHG

## 2019-05-31 DIAGNOSIS — N17.9 ACUTE RENAL FAILURE SUPERIMPOSED ON STAGE 3 CHRONIC KIDNEY DISEASE, UNSPECIFIED ACUTE RENAL FAILURE TYPE: ICD-10-CM

## 2019-05-31 DIAGNOSIS — Z09 HOSPITAL DISCHARGE FOLLOW-UP: Primary | ICD-10-CM

## 2019-05-31 DIAGNOSIS — J96.01 ACUTE RESPIRATORY FAILURE WITH HYPOXIA (HCC): ICD-10-CM

## 2019-05-31 DIAGNOSIS — N18.3 ACUTE RENAL FAILURE SUPERIMPOSED ON STAGE 3 CHRONIC KIDNEY DISEASE, UNSPECIFIED ACUTE RENAL FAILURE TYPE: ICD-10-CM

## 2019-05-31 DIAGNOSIS — J18.9 COMMUNITY ACQUIRED PNEUMONIA OF LEFT LUNG, UNSPECIFIED PART OF LUNG: ICD-10-CM

## 2019-05-31 PROCEDURE — 99214 OFFICE O/P EST MOD 30 MIN: CPT | Performed by: INTERNAL MEDICINE

## 2019-05-31 NOTE — PROGRESS NOTES
Chief Complaint   Patient presents with   • Hospital discharge follow-up     [facility] D/C F/U     Subjective   Noe Barnes Sr. is a 73 y.o. male who presents to the office for a hospital follow-up.  He was admitted to the hospital in [place] on 5/20/2019 and discharged on 5/22/2019.  He presented to the emergency room with cough congestion and shortness of breath.  He was diagnosed with pneumonia and admitted for treatment.  He was hypoxic on admission.  He was given Rocephin and Zithromax while in the hospital.  At discharge he was given an additional 5 days of Cefpodoxime and Zithromax.  No changes were made to his routine medications.  He has chronic renal impairment, stage III.  His creatinine on admission to the hospital was elevated at 1.8.  At discharge this had returned to his baseline at 1.3.    The following portions of the patient's history were reviewed and updated as appropriate: allergies, current medications, past family history, past medical history, past social history, past surgical history and problem list.    Review of Systems   Constitutional: Negative for chills, fatigue and fever.   HENT: Negative for congestion, sneezing, sore throat and trouble swallowing.    Eyes: Negative for visual disturbance.   Respiratory: Negative for cough, chest tightness, shortness of breath and wheezing.    Cardiovascular: Positive for leg swelling. Negative for chest pain and palpitations.   Gastrointestinal: Negative for abdominal pain, constipation, diarrhea, nausea and vomiting.   Genitourinary: Negative for dysuria, frequency and urgency.   Musculoskeletal: Negative for neck pain.   Neurological: Negative for dizziness, weakness and headaches.   Psychiatric/Behavioral:        Patient denies any feelings of depression and has not felt down, hopeless or lost interest in any activities.   All other systems reviewed and are negative.      Objective   Vitals:    05/31/19 0902   BP: 130/76   BP Location:  "Left arm   Patient Position: Sitting   Cuff Size: Adult   Pulse: 74   Temp: 97.6 °F (36.4 °C)   TempSrc: Oral   SpO2: 96%   Weight: 112 kg (246 lb)   Height: 172.7 cm (68\")   PainSc: 0-No pain     Physical Exam   Constitutional: He is oriented to person, place, and time. He appears well-developed and well-nourished. No distress.   HENT:   Head: Normocephalic and atraumatic.   Nose: Nose normal.   Mouth/Throat: Oropharynx is clear and moist. No oropharyngeal exudate.   Eyes: Conjunctivae and EOM are normal. Pupils are equal, round, and reactive to light. No scleral icterus.   Neck: Normal range of motion. Neck supple.   Cardiovascular: Normal rate, regular rhythm and normal heart sounds. Exam reveals no gallop and no friction rub.   No murmur heard.  Pulmonary/Chest: Effort normal and breath sounds normal. No respiratory distress. He has no wheezes. He has no rales.   Abdominal: Soft. Bowel sounds are normal. He exhibits no distension. There is no tenderness. There is no rebound and no guarding.   Musculoskeletal: Normal range of motion. He exhibits no edema.   Lymphadenopathy:     He has no cervical adenopathy.   Neurological: He is alert and oriented to person, place, and time. No cranial nerve deficit.   Skin: Skin is warm and dry. No rash noted.   Psychiatric: He has a normal mood and affect. His behavior is normal. Judgment and thought content normal.   Nursing note and vitals reviewed.      Assessment/Plan   Noe was seen today for hospital discharge follow-up.    Diagnoses and all orders for this visit:    Hospital discharge follow-up    Community acquired pneumonia of left lung, unspecified part of lung    Acute respiratory failure with hypoxia (CMS/HCC)    Acute renal failure superimposed on stage 3 chronic kidney disease, unspecified acute renal failure type (CMS/HCC)         I obtained records from his hospitalization.  These are reviewed and discussed above in the history of present illness.  He has now " finished his antibiotics.  His breathing is back to baseline.  No medication changes are made today.  His breathing is back to normal, and his oxygen saturation is 96% on room air.    Current outpatient and discharge medications have been reconciled for the patient.  Reviewed by: Elia Herron MD      PHQ-2/PHQ-9 Depression Screening 5/31/2019   Little interest or pleasure in doing things 0   Feeling down, depressed, or hopeless 0   Trouble falling or staying asleep, or sleeping too much -   Feeling tired or having little energy -   Poor appetite or overeating -   Feeling bad about yourself - or that you are a failure or have let yourself or your family down -   Trouble concentrating on things, such as reading the newspaper or watching television -   Moving or speaking so slowly that other people could have noticed. Or the opposite - being so fidgety or restless that you have been moving around a lot more than usual -   Thoughts that you would be better off dead, or of hurting yourself in some way -   Total Score 0   If you checked off any problems, how difficult have these problems made it for you to do your work, take care of things at home, or get along with other people? -         Lab on 05/09/2019   Component Date Value Ref Range Status   • PSA 05/09/2019 0.186  0.000 - 4.000 ng/mL Final   ]

## 2019-06-03 RX ORDER — FINASTERIDE 5 MG/1
5 TABLET, FILM COATED ORAL DAILY
Qty: 90 TABLET | Refills: 1 | Status: SHIPPED | OUTPATIENT
Start: 2019-06-03 | End: 2020-06-25 | Stop reason: SDUPTHER

## 2019-06-03 NOTE — TELEPHONE ENCOUNTER
"Patients wife Arlin phoned, stated \"Noe was getting Finasteride 5mg once daily from his Urologist Dr. Richard Stubbs is no longer there. Will Dr. Herron refill this and send to [facility]\"?  "

## 2019-06-04 ENCOUNTER — TELEPHONE (OUTPATIENT)
Dept: FAMILY MEDICINE CLINIC | Facility: CLINIC | Age: 74
End: 2019-06-04

## 2019-06-04 RX ORDER — DOXYCYCLINE HYCLATE 100 MG/1
100 CAPSULE ORAL 2 TIMES DAILY
Qty: 20 CAPSULE | Refills: 0 | Status: SHIPPED | OUTPATIENT
Start: 2019-06-04 | End: 2019-06-14

## 2019-06-04 NOTE — TELEPHONE ENCOUNTER
I looked at his foot when he was here for a hospital follow up last week. He had mild erythema on top of the left foot. If this has not improved, lets treat for cellulitis with Doxycycline 100mg BID x 10 days. If it doesn't improve, we will proceed with further evaluation/treatment.

## 2019-06-04 NOTE — TELEPHONE ENCOUNTER
Patient wife Arlin called and said that she thinks Noe has and infection in his left foot and she is wanting him to be referred to Dr Samson in [place].

## 2019-06-04 NOTE — TELEPHONE ENCOUNTER
Patient's wife has been notified and stated her understanding. She will  the abx and contact office if the left foot is not any better after the abx.

## 2019-07-02 DIAGNOSIS — I10 ESSENTIAL HYPERTENSION: Chronic | ICD-10-CM

## 2019-07-02 RX ORDER — HYDRALAZINE HYDROCHLORIDE 50 MG/1
TABLET, FILM COATED ORAL
Qty: 180 TABLET | Refills: 2 | Status: SHIPPED | OUTPATIENT
Start: 2019-07-02 | End: 2020-03-23

## 2019-07-18 DIAGNOSIS — I10 ESSENTIAL HYPERTENSION: Chronic | ICD-10-CM

## 2019-07-18 RX ORDER — METOPROLOL SUCCINATE 50 MG/1
TABLET, EXTENDED RELEASE ORAL
Qty: 60 TABLET | Refills: 4 | OUTPATIENT
Start: 2019-07-18

## 2019-07-24 DIAGNOSIS — M1A.0710 IDIOPATHIC CHRONIC GOUT OF RIGHT FOOT WITHOUT TOPHUS: Chronic | ICD-10-CM

## 2019-07-24 DIAGNOSIS — N18.1 CHRONIC RENAL IMPAIRMENT, STAGE 1: Chronic | ICD-10-CM

## 2019-07-25 RX ORDER — ALLOPURINOL 300 MG/1
300 TABLET ORAL DAILY
Qty: 90 TABLET | Refills: 0 | Status: SHIPPED | OUTPATIENT
Start: 2019-07-25 | End: 2020-01-27

## 2019-08-15 DIAGNOSIS — I82.412 ACUTE DEEP VEIN THROMBOSIS (DVT) OF FEMORAL VEIN OF LEFT LOWER EXTREMITY (HCC): ICD-10-CM

## 2019-08-15 RX ORDER — ATENOLOL 100 MG/1
TABLET ORAL
Qty: 60 CAPSULE | Refills: 0 | Status: SHIPPED | OUTPATIENT
Start: 2019-08-15 | End: 2019-10-11 | Stop reason: SDUPTHER

## 2019-08-26 DIAGNOSIS — I10 ESSENTIAL HYPERTENSION: Chronic | ICD-10-CM

## 2019-08-26 RX ORDER — METOPROLOL SUCCINATE 50 MG/1
TABLET, EXTENDED RELEASE ORAL
Qty: 60 TABLET | Refills: 0 | Status: SHIPPED | OUTPATIENT
Start: 2019-08-26 | End: 2019-09-19 | Stop reason: SDUPTHER

## 2019-08-30 ENCOUNTER — LAB (OUTPATIENT)
Dept: LAB | Facility: OTHER | Age: 74
End: 2019-08-30

## 2019-08-30 ENCOUNTER — TRANSCRIBE ORDERS (OUTPATIENT)
Dept: LAB | Facility: OTHER | Age: 74
End: 2019-08-30

## 2019-08-30 DIAGNOSIS — E83.42 HYPOMAGNESEMIA: ICD-10-CM

## 2019-08-30 DIAGNOSIS — I10 ESSENTIAL HYPERTENSION: ICD-10-CM

## 2019-08-30 DIAGNOSIS — E79.0 HYPERURICEMIA: ICD-10-CM

## 2019-08-30 DIAGNOSIS — D64.9 ANEMIA, UNSPECIFIED TYPE: ICD-10-CM

## 2019-08-30 DIAGNOSIS — E78.5 HYPERLIPIDEMIA, UNSPECIFIED HYPERLIPIDEMIA TYPE: ICD-10-CM

## 2019-08-30 DIAGNOSIS — N28.1 COMPLEX RENAL CYST: ICD-10-CM

## 2019-08-30 DIAGNOSIS — N18.30 CHRONIC KIDNEY DISEASE, STAGE III (MODERATE) (HCC): Primary | ICD-10-CM

## 2019-08-30 DIAGNOSIS — N39.0 URINARY TRACT INFECTION WITHOUT HEMATURIA, SITE UNSPECIFIED: ICD-10-CM

## 2019-08-30 DIAGNOSIS — E78.2 MIXED HYPERLIPIDEMIA: Chronic | ICD-10-CM

## 2019-08-30 DIAGNOSIS — D50.8 IRON DEFICIENCY ANEMIA DUE TO DIETARY CAUSES: ICD-10-CM

## 2019-08-30 DIAGNOSIS — I12.9 HYPERTENSIVE NEPHROSCLEROSIS, STAGE 1 THROUGH STAGE 4 OR UNSPECIFIED CHRONIC KIDNEY DISEASE: ICD-10-CM

## 2019-08-30 DIAGNOSIS — R73.02 IMPAIRED GLUCOSE TOLERANCE: Chronic | ICD-10-CM

## 2019-08-30 DIAGNOSIS — N18.30 CHRONIC KIDNEY DISEASE, STAGE III (MODERATE) (HCC): ICD-10-CM

## 2019-08-30 LAB
ALBUMIN SERPL-MCNC: 3.9 G/DL (ref 3.5–5)
ALBUMIN/GLOB SERPL: 1.2 G/DL (ref 1.1–1.8)
ALP SERPL-CCNC: 78 U/L (ref 38–126)
ALT SERPL W P-5'-P-CCNC: 64 U/L
ANION GAP SERPL CALCULATED.3IONS-SCNC: 8 MMOL/L (ref 5–15)
AST SERPL-CCNC: 62 U/L (ref 17–59)
BACTERIA UR QL AUTO: ABNORMAL /HPF
BILIRUB SERPL-MCNC: 0.7 MG/DL (ref 0.2–1.3)
BILIRUB UR QL STRIP: NEGATIVE
BUN BLD-MCNC: 30 MG/DL (ref 7–23)
BUN/CREAT SERPL: 20.1 (ref 7–25)
CALCIUM SPEC-SCNC: 8.9 MG/DL (ref 8.4–10.2)
CHLORIDE SERPL-SCNC: 101 MMOL/L (ref 101–112)
CLARITY UR: CLEAR
CO2 SERPL-SCNC: 33 MMOL/L (ref 22–30)
COLOR UR: YELLOW
CREAT BLD-MCNC: 1.49 MG/DL (ref 0.7–1.3)
DEPRECATED RDW RBC AUTO: 55.1 FL (ref 37–54)
EOSINOPHIL # BLD MANUAL: 0.42 10*3/MM3 (ref 0–0.4)
EOSINOPHIL NFR BLD MANUAL: 5 % (ref 0.3–6.2)
ERYTHROCYTE [DISTWIDTH] IN BLOOD BY AUTOMATED COUNT: 14.5 % (ref 12.3–15.4)
GFR SERPL CREATININE-BSD FRML MDRD: 46 ML/MIN/1.73 (ref 42–98)
GLOBULIN UR ELPH-MCNC: 3.2 GM/DL (ref 2.3–3.5)
GLUCOSE BLD-MCNC: 125 MG/DL (ref 70–99)
GLUCOSE UR STRIP-MCNC: NEGATIVE MG/DL
HCT VFR BLD AUTO: 38.5 % (ref 37.5–51)
HGB BLD-MCNC: 12.4 G/DL (ref 13–17.7)
HGB UR QL STRIP.AUTO: ABNORMAL
HYALINE CASTS UR QL AUTO: ABNORMAL /LPF
HYPOCHROMIA BLD QL: ABNORMAL
KETONES UR QL STRIP: NEGATIVE
LEUKOCYTE ESTERASE UR QL STRIP.AUTO: ABNORMAL
LYMPHOCYTES # BLD MANUAL: 3.07 10*3/MM3 (ref 0.7–3.1)
LYMPHOCYTES NFR BLD MANUAL: 37 % (ref 19.6–45.3)
LYMPHOCYTES NFR BLD MANUAL: 8 % (ref 5–12)
MACROCYTES BLD QL SMEAR: ABNORMAL
MCH RBC QN AUTO: 34.6 PG (ref 26.6–33)
MCHC RBC AUTO-ENTMCNC: 32.2 G/DL (ref 31.5–35.7)
MCV RBC AUTO: 107.5 FL (ref 79–97)
MONOCYTES # BLD AUTO: 0.66 10*3/MM3 (ref 0.1–0.9)
MUCOUS THREADS URNS QL MICRO: ABNORMAL /HPF
NEUTROPHILS # BLD AUTO: 4.16 10*3/MM3 (ref 1.7–7)
NEUTROPHILS NFR BLD MANUAL: 50 % (ref 42.7–76)
NITRITE UR QL STRIP: NEGATIVE
PH UR STRIP.AUTO: <=5 [PH] (ref 5.5–8)
PLATELET # BLD AUTO: 207 10*3/MM3 (ref 140–450)
PMV BLD AUTO: 9.1 FL (ref 6–12)
POTASSIUM BLD-SCNC: 4.3 MMOL/L (ref 3.4–5)
PROT SERPL-MCNC: 7.1 G/DL (ref 6.3–8.6)
PROT UR QL STRIP: NEGATIVE
RBC # BLD AUTO: 3.58 10*6/MM3 (ref 4.14–5.8)
RBC # UR: ABNORMAL /HPF
REF LAB TEST METHOD: ABNORMAL
SCAN SLIDE: NORMAL
SMALL PLATELETS BLD QL SMEAR: ADEQUATE
SODIUM BLD-SCNC: 142 MMOL/L (ref 137–145)
SP GR UR STRIP: 1.02 (ref 1–1.03)
SQUAMOUS #/AREA URNS HPF: ABNORMAL /HPF
TRIGL SERPL-MCNC: 219 MG/DL
URATE SERPL-MCNC: 6.6 MG/DL (ref 3.5–8.5)
UROBILINOGEN UR QL STRIP: ABNORMAL
WBC MORPH BLD: NORMAL
WBC NRBC COR # BLD: 8.31 10*3/MM3 (ref 3.4–10.8)
WBC UR QL AUTO: ABNORMAL /HPF

## 2019-08-30 PROCEDURE — 84478 ASSAY OF TRIGLYCERIDES: CPT | Performed by: INTERNAL MEDICINE

## 2019-08-30 PROCEDURE — 36415 COLL VENOUS BLD VENIPUNCTURE: CPT | Performed by: INTERNAL MEDICINE

## 2019-08-30 PROCEDURE — 84550 ASSAY OF BLOOD/URIC ACID: CPT | Performed by: INTERNAL MEDICINE

## 2019-08-30 PROCEDURE — 85025 COMPLETE CBC W/AUTO DIFF WBC: CPT | Performed by: INTERNAL MEDICINE

## 2019-08-30 PROCEDURE — 83721 ASSAY OF BLOOD LIPOPROTEIN: CPT | Performed by: INTERNAL MEDICINE

## 2019-08-30 PROCEDURE — 84439 ASSAY OF FREE THYROXINE: CPT | Performed by: INTERNAL MEDICINE

## 2019-08-30 PROCEDURE — 84156 ASSAY OF PROTEIN URINE: CPT | Performed by: INTERNAL MEDICINE

## 2019-08-30 PROCEDURE — 87086 URINE CULTURE/COLONY COUNT: CPT | Performed by: INTERNAL MEDICINE

## 2019-08-30 PROCEDURE — 83036 HEMOGLOBIN GLYCOSYLATED A1C: CPT | Performed by: INTERNAL MEDICINE

## 2019-08-30 PROCEDURE — 80053 COMPREHEN METABOLIC PANEL: CPT | Performed by: INTERNAL MEDICINE

## 2019-08-30 PROCEDURE — 82570 ASSAY OF URINE CREATININE: CPT | Performed by: INTERNAL MEDICINE

## 2019-08-30 PROCEDURE — 81001 URINALYSIS AUTO W/SCOPE: CPT | Performed by: INTERNAL MEDICINE

## 2019-08-30 PROCEDURE — 87147 CULTURE TYPE IMMUNOLOGIC: CPT | Performed by: INTERNAL MEDICINE

## 2019-08-30 PROCEDURE — 84443 ASSAY THYROID STIM HORMONE: CPT | Performed by: INTERNAL MEDICINE

## 2019-08-31 LAB
ARTICHOKE IGE QN: 94 MG/DL (ref 0–100)
BACTERIA SPEC AEROBE CULT: ABNORMAL
CREAT UR-MCNC: 168.9 MG/DL
HBA1C MFR BLD: 6.2 % (ref 4.8–5.6)
PROT UR-MCNC: 15 MG/DL
PROT/CREAT UR: 88.8 MG/G CREA (ref 0–200)
STREP GROUPING: ABNORMAL
T4 FREE SERPL-MCNC: 1.24 NG/DL (ref 0.93–1.7)
TSH SERPL DL<=0.05 MIU/L-ACNC: 3.99 UIU/ML (ref 0.27–4.2)

## 2019-09-09 RX ORDER — AMOXICILLIN 500 MG/1
500 CAPSULE ORAL 3 TIMES DAILY
Qty: 21 CAPSULE | Refills: 0 | Status: SHIPPED | OUTPATIENT
Start: 2019-09-09 | End: 2019-09-16

## 2019-09-19 ENCOUNTER — OFFICE VISIT (OUTPATIENT)
Dept: FAMILY MEDICINE CLINIC | Facility: CLINIC | Age: 74
End: 2019-09-19

## 2019-09-19 VITALS
SYSTOLIC BLOOD PRESSURE: 130 MMHG | HEIGHT: 68 IN | DIASTOLIC BLOOD PRESSURE: 70 MMHG | OXYGEN SATURATION: 93 % | BODY MASS INDEX: 38.65 KG/M2 | TEMPERATURE: 96.4 F | HEART RATE: 74 BPM | WEIGHT: 255 LBS

## 2019-09-19 DIAGNOSIS — E78.2 MIXED HYPERLIPIDEMIA: Chronic | ICD-10-CM

## 2019-09-19 DIAGNOSIS — N18.30 CKD (CHRONIC KIDNEY DISEASE) STAGE 3, GFR 30-59 ML/MIN (HCC): Chronic | ICD-10-CM

## 2019-09-19 DIAGNOSIS — R73.02 IMPAIRED GLUCOSE TOLERANCE: Primary | Chronic | ICD-10-CM

## 2019-09-19 DIAGNOSIS — I10 ESSENTIAL HYPERTENSION: Chronic | ICD-10-CM

## 2019-09-19 DIAGNOSIS — N40.0 BENIGN PROSTATIC HYPERPLASIA WITHOUT LOWER URINARY TRACT SYMPTOMS: Chronic | ICD-10-CM

## 2019-09-19 DIAGNOSIS — D50.8 IRON DEFICIENCY ANEMIA DUE TO DIETARY CAUSES: Chronic | ICD-10-CM

## 2019-09-19 PROCEDURE — 99214 OFFICE O/P EST MOD 30 MIN: CPT | Performed by: INTERNAL MEDICINE

## 2019-09-19 NOTE — PROGRESS NOTES
Chief Complaint   Patient presents with   • Hyperlipidemia     6 clay f/u with labs   • Hypertension     Subjective   Noe Barnes Sr. is a 73 y.o. male who presents to the office for follow-up and review of labs. He has impaired glucose tolerance and has been monitoring his dietary intake of sugars and carbohydrates.  He has hypertension and his blood pressure has been controlled.  He has GERD which is well controlled with Protonix.    He has hyperlipidemia and takes pravastatin daily.  He has iron deficient anemia and takes an iron supplement daily.  He has renal insufficiency, stage III and follows with nephrology.  His baseline creatinine runs around 1.4.  He takes Lasix and spironolactone for peripheral edema.  He takes finasteride for treatment of BPH.    He has a recent history of a DVT and takes Pradaxa for anticoagulation.  He was previously taking Eliquis, but developed a rash which was thought to be secondary to this.  When he switched to Pradaxa, the rash resolved.    The following portions of the patient's history were reviewed and updated as appropriate: allergies, current medications, past family history, past medical history, past social history, past surgical history and problem list.    Review of Systems   Constitutional: Negative for chills, fatigue and fever.   HENT: Negative for congestion, sneezing, sore throat and trouble swallowing.    Eyes: Negative for visual disturbance.   Respiratory: Negative for cough, chest tightness, shortness of breath and wheezing.    Cardiovascular: Negative for chest pain and palpitations.   Gastrointestinal: Negative for abdominal pain, constipation, diarrhea, nausea and vomiting.   Genitourinary: Negative for dysuria, frequency and urgency.   Musculoskeletal: Negative for neck pain.   Neurological: Negative for dizziness, weakness and headaches.   Psychiatric/Behavioral:        Patient denies any feelings of depression and has not felt down, hopeless or lost  "interest in any activities.   All other systems reviewed and are negative.      Objective   Vitals:    09/19/19 0904   BP: 130/70   Pulse: 74   Temp: 96.4 °F (35.8 °C)   SpO2: 93%   Weight: 116 kg (255 lb)   Height: 172.7 cm (68\")   PainSc: 0-No pain     Physical Exam   Constitutional: He is oriented to person, place, and time. He appears well-developed and well-nourished. No distress.   HENT:   Head: Normocephalic and atraumatic.   Nose: Nose normal.   Mouth/Throat: Oropharynx is clear and moist. No oropharyngeal exudate.   Eyes: Conjunctivae and EOM are normal. Pupils are equal, round, and reactive to light. No scleral icterus.   Neck: Normal range of motion. Neck supple.   Cardiovascular: Normal rate, regular rhythm and normal heart sounds. Exam reveals no gallop and no friction rub.   No murmur heard.  Pulmonary/Chest: Effort normal and breath sounds normal. No respiratory distress. He has no wheezes. He has no rales.   Abdominal: Soft. Bowel sounds are normal. He exhibits no distension. There is no tenderness. There is no rebound and no guarding.   Musculoskeletal: Normal range of motion. He exhibits no edema.   Lymphadenopathy:     He has no cervical adenopathy.   Neurological: He is alert and oriented to person, place, and time. No cranial nerve deficit.   Skin: Skin is warm and dry. No rash noted.   Psychiatric: He has a normal mood and affect. His behavior is normal. Judgment and thought content normal.   Nursing note and vitals reviewed.      Assessment/Plan   Noe was seen today for hyperlipidemia and hypertension.    Diagnoses and all orders for this visit:    Impaired glucose tolerance  -     Hemoglobin A1c; Future    Essential hypertension  -     Comprehensive Metabolic Panel; Future  -     T4, Free; Future  -     TSH; Future  -     Urinalysis With Culture If Indicated -; Future    Mixed hyperlipidemia  -     Lipid Panel; Future    CKD (chronic kidney disease) stage 3, GFR 30-59 ml/min " (CMS/HCC)    Benign prostatic hyperplasia without lower urinary tract symptoms    Iron deficiency anemia due to dietary causes  -     CBC & Differential; Future         Labs are reviewed with patient.  His glucose is elevated at 125.  His hemoglobin A1c is 6.20.  Patient will continue to watch diet to help maintain control of the glucose.  Patient understands that there is an increased risk of developing diabetes in the future.  His ALT and AST are mildly elevated.  Given the elevation in his glucose and hemoglobin A1c, this is likely secondary to fatty infiltration of the liver.  I will continue to monitor this for now.  His triglycerides are elevated at 219, and his LDL is at goal. He will continue with pravastatin for treatment of his hyperlipidemia.  His creatinine is elevated at 1.49.  He will continue to follow with nephrology.  He will continue with Protonix for treatment of GERD.  His blood pressure is controlled, and he will continue with his current blood pressure medication.  His anemia is stable, and He will continue to take the daily iron supplement.  His hemoglobin is 12.4, and this is improved from the previous visit.  He will continue with Pradaxa for anticoagulation secondary to the previous DVT and pulmonary embolus.      PHQ-2/PHQ-9 Depression Screening 9/19/2019   Little interest or pleasure in doing things 0   Feeling down, depressed, or hopeless 0   Trouble falling or staying asleep, or sleeping too much -   Feeling tired or having little energy -   Poor appetite or overeating -   Feeling bad about yourself - or that you are a failure or have let yourself or your family down -   Trouble concentrating on things, such as reading the newspaper or watching television -   Moving or speaking so slowly that other people could have noticed. Or the opposite - being so fidgety or restless that you have been moving around a lot more than usual -   Thoughts that you would be better off dead, or of hurting  yourself in some way -   Total Score 0   If you checked off any problems, how difficult have these problems made it for you to do your work, take care of things at home, or get along with other people? -         Lab on 08/30/2019   Component Date Value Ref Range Status   • Protein/Creatinine Ratio, Urine 08/30/2019 88.8  0.0 - 200.0 mg/G Crea Final   • Creatinine, Urine 08/30/2019 168.9  mg/dL Final   • Total Protein, Urine 08/30/2019 15.0  mg/dL Final   • Glucose 08/30/2019 125* 70 - 99 mg/dL Final   • BUN 08/30/2019 30* 7 - 23 mg/dL Final   • Creatinine 08/30/2019 1.49* 0.70 - 1.30 mg/dL Final   • Sodium 08/30/2019 142  137 - 145 mmol/L Final   • Potassium 08/30/2019 4.3  3.4 - 5.0 mmol/L Final   • Chloride 08/30/2019 101  101 - 112 mmol/L Final   • CO2 08/30/2019 33.0* 22.0 - 30.0 mmol/L Final   • Calcium 08/30/2019 8.9  8.4 - 10.2 mg/dL Final   • Total Protein 08/30/2019 7.1  6.3 - 8.6 g/dL Final   • Albumin 08/30/2019 3.90  3.50 - 5.00 g/dL Final   • ALT (SGPT) 08/30/2019 64* <=50 U/L Final   • AST (SGOT) 08/30/2019 62* 17 - 59 U/L Final   • Alkaline Phosphatase 08/30/2019 78  38 - 126 U/L Final   • Total Bilirubin 08/30/2019 0.7  0.2 - 1.3 mg/dL Final   • eGFR Non African Amer 08/30/2019 46  42 - 98 mL/min/1.73 Final   • Globulin 08/30/2019 3.2  2.3 - 3.5 gm/dL Final   • A/G Ratio 08/30/2019 1.2  1.1 - 1.8 g/dL Final   • BUN/Creatinine Ratio 08/30/2019 20.1  7.0 - 25.0 Final   • Anion Gap 08/30/2019 8.0  5.0 - 15.0 mmol/L Final   • Free T4 08/30/2019 1.24  0.93 - 1.70 ng/dL Final   • TSH 08/30/2019 3.990  0.270 - 4.200 uIU/mL Final   • Color, UA 08/30/2019 Yellow  Yellow, Straw Final   • Appearance, UA 08/30/2019 Clear  Clear Final   • pH, UA 08/30/2019 <=5.0* 5.5 - 8.0 Final   • Specific Gravity, UA 08/30/2019 1.020  1.005 - 1.030 Final   • Glucose, UA 08/30/2019 Negative  Negative Final   • Ketones, UA 08/30/2019 Negative  Negative Final   • Bilirubin, UA 08/30/2019 Negative  Negative Final   • Blood, UA  08/30/2019 Trace* Negative Final   • Protein, UA 08/30/2019 Negative  Negative Final   • Leuk Esterase, UA 08/30/2019 Small (1+)* Negative Final   • Nitrite, UA 08/30/2019 Negative  Negative Final   • Urobilinogen, UA 08/30/2019 0.2 E.U./dL  0.2 - 1.0 E.U./dL Final   • Hemoglobin A1C 08/30/2019 6.20* 4.80 - 5.60 % Final   • LDL Cholesterol  08/30/2019 94  0 - 100 mg/dL Final   • Triglycerides 08/30/2019 219* <=150 mg/dL Final   • Uric Acid 08/30/2019 6.6  3.5 - 8.5 mg/dL Final   • WBC 08/30/2019 8.31  3.40 - 10.80 10*3/mm3 Final   • RBC 08/30/2019 3.58* 4.14 - 5.80 10*6/mm3 Final   • Hemoglobin 08/30/2019 12.4* 13.0 - 17.7 g/dL Final   • Hematocrit 08/30/2019 38.5  37.5 - 51.0 % Final   • MCV 08/30/2019 107.5* 79.0 - 97.0 fL Final   • MCH 08/30/2019 34.6* 26.6 - 33.0 pg Final   • MCHC 08/30/2019 32.2  31.5 - 35.7 g/dL Final   • RDW 08/30/2019 14.5  12.3 - 15.4 % Final   • RDW-SD 08/30/2019 55.1* 37.0 - 54.0 fl Final   • MPV 08/30/2019 9.1  6.0 - 12.0 fL Final   • Platelets 08/30/2019 207  140 - 450 10*3/mm3 Final   • RBC, UA 08/30/2019 3-5* None Seen /HPF Final   • WBC, UA 08/30/2019 6-12* None Seen /HPF Final   • Bacteria, UA 08/30/2019 Trace* None Seen /HPF Final   • Squamous Epithelial Cells, UA 08/30/2019 3-6* None Seen, 0-2 /HPF Final   • Hyaline Casts, UA 08/30/2019 0-2  None Seen /LPF Final   • Mucus, UA 08/30/2019 Trace  None Seen, Trace /HPF Final   • Methodology 08/30/2019 Manual Light Microscopy   Final   • Scan Slide 08/30/2019    Final    See Manual Differential Results   • Urine Culture 08/30/2019 >100,000 CFU/mL Streptococcus agalactiae (Group B)*  Final    This organism is considered to be universally susceptible to penicillin.  No further antibiotic testing will be performed.   • STREP GROUPING 08/30/2019 B   Final   • Neutrophil % 08/30/2019 50.0  42.7 - 76.0 % Final   • Lymphocyte % 08/30/2019 37.0  19.6 - 45.3 % Final   • Monocyte % 08/30/2019 8.0  5.0 - 12.0 % Final   • Eosinophil % 08/30/2019  5.0  0.3 - 6.2 % Final   • Neutrophils Absolute 08/30/2019 4.16  1.70 - 7.00 10*3/mm3 Final   • Lymphocytes Absolute 08/30/2019 3.07  0.70 - 3.10 10*3/mm3 Final   • Monocytes Absolute 08/30/2019 0.66  0.10 - 0.90 10*3/mm3 Final   • Eosinophils Absolute 08/30/2019 0.42* 0.00 - 0.40 10*3/mm3 Final   • Hypochromia 08/30/2019 Slight/1+  None Seen Final   • Macrocytes 08/30/2019 Slight/1+  None Seen Final   • WBC Morphology 08/30/2019 Normal  Normal Final   • Platelet Estimate 08/30/2019 Adequate  Normal Final   ]

## 2019-09-23 DIAGNOSIS — K21.9 GASTROESOPHAGEAL REFLUX DISEASE WITHOUT ESOPHAGITIS: Chronic | ICD-10-CM

## 2019-09-23 DIAGNOSIS — I10 ESSENTIAL HYPERTENSION: Chronic | ICD-10-CM

## 2019-09-23 RX ORDER — LOSARTAN POTASSIUM 100 MG/1
TABLET ORAL
Qty: 90 TABLET | Refills: 3 | Status: SHIPPED | OUTPATIENT
Start: 2019-09-23 | End: 2020-06-25 | Stop reason: SDUPTHER

## 2019-09-23 RX ORDER — PANTOPRAZOLE SODIUM 40 MG/1
40 TABLET, DELAYED RELEASE ORAL DAILY
Qty: 90 TABLET | Refills: 3 | Status: SHIPPED | OUTPATIENT
Start: 2019-09-23 | End: 2020-09-24

## 2019-09-23 RX ORDER — METOPROLOL SUCCINATE 50 MG/1
TABLET, EXTENDED RELEASE ORAL
Qty: 180 TABLET | Refills: 3 | Status: SHIPPED | OUTPATIENT
Start: 2019-09-23 | End: 2020-06-25 | Stop reason: SDUPTHER

## 2019-10-11 DIAGNOSIS — I82.412 ACUTE DEEP VEIN THROMBOSIS (DVT) OF FEMORAL VEIN OF LEFT LOWER EXTREMITY (HCC): ICD-10-CM

## 2019-10-11 DIAGNOSIS — E78.2 MIXED HYPERLIPIDEMIA: Chronic | ICD-10-CM

## 2019-10-11 RX ORDER — PRAVASTATIN SODIUM 20 MG
TABLET ORAL
Qty: 90 TABLET | Refills: 3 | Status: SHIPPED | OUTPATIENT
Start: 2019-10-11 | End: 2020-06-25 | Stop reason: SDUPTHER

## 2019-10-11 RX ORDER — ATENOLOL 100 MG/1
TABLET ORAL
Qty: 60 CAPSULE | Refills: 5 | Status: SHIPPED | OUTPATIENT
Start: 2019-10-11 | End: 2020-06-25

## 2019-10-18 ENCOUNTER — TELEPHONE (OUTPATIENT)
Dept: CASE MANAGEMENT | Facility: OTHER | Age: 74
End: 2019-10-18

## 2019-10-18 NOTE — TELEPHONE ENCOUNTER
Called patient to schedule Medicare Annual Wellness Visit before end of the year.  Patient did not answer, voicemail left requesting return call to [facility] at [phone].

## 2020-01-27 DIAGNOSIS — N18.1 CHRONIC RENAL IMPAIRMENT, STAGE 1: Chronic | ICD-10-CM

## 2020-01-27 DIAGNOSIS — M1A.0710 IDIOPATHIC CHRONIC GOUT OF RIGHT FOOT WITHOUT TOPHUS: Chronic | ICD-10-CM

## 2020-01-27 RX ORDER — ALLOPURINOL 300 MG/1
300 TABLET ORAL DAILY
Qty: 90 TABLET | Refills: 0 | Status: SHIPPED | OUTPATIENT
Start: 2020-01-27 | End: 2020-04-30

## 2020-03-04 ENCOUNTER — TRANSCRIBE ORDERS (OUTPATIENT)
Dept: GENERAL RADIOLOGY | Facility: CLINIC | Age: 75
End: 2020-03-04

## 2020-03-04 ENCOUNTER — LAB (OUTPATIENT)
Dept: LAB | Facility: OTHER | Age: 75
End: 2020-03-04

## 2020-03-04 DIAGNOSIS — E78.5 HYPERLIPIDEMIA, UNSPECIFIED HYPERLIPIDEMIA TYPE: ICD-10-CM

## 2020-03-04 DIAGNOSIS — E79.0 HYPERURICEMIA: ICD-10-CM

## 2020-03-04 DIAGNOSIS — I10 ESSENTIAL HYPERTENSION: ICD-10-CM

## 2020-03-04 DIAGNOSIS — I12.9 HYPERTENSIVE NEPHROPATHY: ICD-10-CM

## 2020-03-04 DIAGNOSIS — E78.2 MIXED HYPERLIPIDEMIA: Chronic | ICD-10-CM

## 2020-03-04 DIAGNOSIS — D64.9 ANEMIA, UNSPECIFIED TYPE: ICD-10-CM

## 2020-03-04 DIAGNOSIS — N39.0 URINARY TRACT INFECTION WITHOUT HEMATURIA, SITE UNSPECIFIED: ICD-10-CM

## 2020-03-04 DIAGNOSIS — N28.1 COMPLEX RENAL CYST: ICD-10-CM

## 2020-03-04 DIAGNOSIS — N18.30 CHRONIC KIDNEY DISEASE, STAGE III (MODERATE) (HCC): Primary | ICD-10-CM

## 2020-03-04 DIAGNOSIS — N18.30 CHRONIC KIDNEY DISEASE, STAGE III (MODERATE) (HCC): ICD-10-CM

## 2020-03-04 DIAGNOSIS — E83.42 HYPOMAGNESEMIA: ICD-10-CM

## 2020-03-04 DIAGNOSIS — D50.8 IRON DEFICIENCY ANEMIA DUE TO DIETARY CAUSES: ICD-10-CM

## 2020-03-04 DIAGNOSIS — R73.02 IMPAIRED GLUCOSE TOLERANCE: Chronic | ICD-10-CM

## 2020-03-04 LAB
25(OH)D3 SERPL-MCNC: 37.4 NG/ML (ref 30–100)
ALBUMIN SERPL-MCNC: 3.9 G/DL (ref 3.5–5)
ALBUMIN/GLOB SERPL: 1.2 G/DL (ref 1.1–1.8)
ALP SERPL-CCNC: 80 U/L (ref 38–126)
ALT SERPL W P-5'-P-CCNC: 41 U/L
ANION GAP SERPL CALCULATED.3IONS-SCNC: 9 MMOL/L (ref 5–15)
AST SERPL-CCNC: 46 U/L (ref 17–59)
BACTERIA UR QL AUTO: ABNORMAL /HPF
BILIRUB SERPL-MCNC: 0.8 MG/DL (ref 0.2–1.3)
BILIRUB UR QL STRIP: NEGATIVE
BUN BLD-MCNC: 31 MG/DL (ref 7–23)
BUN/CREAT SERPL: 22.6 (ref 7–25)
CALCIUM SPEC-SCNC: 9.1 MG/DL (ref 8.4–10.2)
CHLORIDE SERPL-SCNC: 100 MMOL/L (ref 101–112)
CHOLEST SERPL-MCNC: 165 MG/DL (ref 150–200)
CLARITY UR: ABNORMAL
CO2 SERPL-SCNC: 33 MMOL/L (ref 22–30)
COLOR UR: ABNORMAL
CREAT BLD-MCNC: 1.37 MG/DL (ref 0.7–1.3)
CREAT UR-MCNC: 196.7 MG/DL
DEPRECATED RDW RBC AUTO: 56 FL (ref 37–54)
EOSINOPHIL # BLD MANUAL: 0.34 10*3/MM3 (ref 0–0.4)
EOSINOPHIL NFR BLD MANUAL: 4 % (ref 0.3–6.2)
ERYTHROCYTE [DISTWIDTH] IN BLOOD BY AUTOMATED COUNT: 14.6 % (ref 12.3–15.4)
GFR SERPL CREATININE-BSD FRML MDRD: 51 ML/MIN/1.73 (ref 42–98)
GLOBULIN UR ELPH-MCNC: 3.3 GM/DL (ref 2.3–3.5)
GLUCOSE BLD-MCNC: 126 MG/DL (ref 70–99)
GLUCOSE UR STRIP-MCNC: NEGATIVE MG/DL
HBA1C MFR BLD: 6.4 % (ref 4.8–5.6)
HCT VFR BLD AUTO: 39 % (ref 37.5–51)
HDLC SERPL-MCNC: 38 MG/DL (ref 40–59)
HGB BLD-MCNC: 12.3 G/DL (ref 13–17.7)
HGB UR QL STRIP.AUTO: NEGATIVE
HYALINE CASTS UR QL AUTO: ABNORMAL /LPF
HYPOCHROMIA BLD QL: NORMAL
KETONES UR QL STRIP: NEGATIVE
LDLC SERPL CALC-MCNC: 86 MG/DL
LDLC/HDLC SERPL: 2.26 {RATIO} (ref 0–3.55)
LEUKOCYTE ESTERASE UR QL STRIP.AUTO: ABNORMAL
LYMPHOCYTES # BLD MANUAL: 3.09 10*3/MM3 (ref 0.7–3.1)
LYMPHOCYTES NFR BLD MANUAL: 36 % (ref 19.6–45.3)
LYMPHOCYTES NFR BLD MANUAL: 7 % (ref 5–12)
MACROCYTES BLD QL SMEAR: NORMAL
MCH RBC QN AUTO: 33.8 PG (ref 26.6–33)
MCHC RBC AUTO-ENTMCNC: 31.5 G/DL (ref 31.5–35.7)
MCV RBC AUTO: 107.1 FL (ref 79–97)
MONOCYTES # BLD AUTO: 0.6 10*3/MM3 (ref 0.1–0.9)
NEUTROPHILS # BLD AUTO: 4.38 10*3/MM3 (ref 1.7–7)
NEUTROPHILS NFR BLD MANUAL: 50 % (ref 42.7–76)
NEUTS BAND NFR BLD MANUAL: 1 % (ref 0–5)
NITRITE UR QL STRIP: NEGATIVE
PH UR STRIP.AUTO: <=5 [PH] (ref 5.5–8)
PLATELET # BLD AUTO: 222 10*3/MM3 (ref 140–450)
PMV BLD AUTO: 9.1 FL (ref 6–12)
POTASSIUM BLD-SCNC: 4.2 MMOL/L (ref 3.4–5)
PROT SERPL-MCNC: 7.2 G/DL (ref 6.3–8.6)
PROT UR QL STRIP: NEGATIVE
PROT UR-MCNC: 11 MG/DL
PROT/CREAT UR: 55.9 MG/G CREA (ref 0–200)
RBC # BLD AUTO: 3.64 10*6/MM3 (ref 4.14–5.8)
RBC # UR: ABNORMAL /HPF
REF LAB TEST METHOD: ABNORMAL
SCAN SLIDE: NORMAL
SMALL PLATELETS BLD QL SMEAR: ADEQUATE
SODIUM BLD-SCNC: 142 MMOL/L (ref 137–145)
SP GR UR STRIP: 1.02 (ref 1–1.03)
SQUAMOUS #/AREA URNS HPF: ABNORMAL /HPF
T4 FREE SERPL-MCNC: 1.42 NG/DL (ref 0.93–1.7)
TRIGL SERPL-MCNC: 206 MG/DL
TSH SERPL DL<=0.05 MIU/L-ACNC: 2.2 UIU/ML (ref 0.27–4.2)
UROBILINOGEN UR QL STRIP: ABNORMAL
VARIANT LYMPHS NFR BLD MANUAL: 2 % (ref 0–5)
VLDLC SERPL-MCNC: 41.2 MG/DL
WBC MORPH BLD: NORMAL
WBC NRBC COR # BLD: 8.59 10*3/MM3 (ref 3.4–10.8)
WBC UR QL AUTO: ABNORMAL /HPF

## 2020-03-04 PROCEDURE — 36415 COLL VENOUS BLD VENIPUNCTURE: CPT | Performed by: INTERNAL MEDICINE

## 2020-03-04 PROCEDURE — 82306 VITAMIN D 25 HYDROXY: CPT | Performed by: INTERNAL MEDICINE

## 2020-03-04 PROCEDURE — 87086 URINE CULTURE/COLONY COUNT: CPT | Performed by: INTERNAL MEDICINE

## 2020-03-04 PROCEDURE — 85025 COMPLETE CBC W/AUTO DIFF WBC: CPT | Performed by: INTERNAL MEDICINE

## 2020-03-04 PROCEDURE — 87147 CULTURE TYPE IMMUNOLOGIC: CPT | Performed by: INTERNAL MEDICINE

## 2020-03-04 PROCEDURE — 84156 ASSAY OF PROTEIN URINE: CPT | Performed by: INTERNAL MEDICINE

## 2020-03-04 PROCEDURE — 84439 ASSAY OF FREE THYROXINE: CPT | Performed by: INTERNAL MEDICINE

## 2020-03-04 PROCEDURE — 82570 ASSAY OF URINE CREATININE: CPT | Performed by: INTERNAL MEDICINE

## 2020-03-04 PROCEDURE — 84443 ASSAY THYROID STIM HORMONE: CPT | Performed by: INTERNAL MEDICINE

## 2020-03-04 PROCEDURE — 81001 URINALYSIS AUTO W/SCOPE: CPT | Performed by: INTERNAL MEDICINE

## 2020-03-04 PROCEDURE — 83036 HEMOGLOBIN GLYCOSYLATED A1C: CPT | Performed by: INTERNAL MEDICINE

## 2020-03-04 PROCEDURE — 80061 LIPID PANEL: CPT | Performed by: INTERNAL MEDICINE

## 2020-03-04 PROCEDURE — 80053 COMPREHEN METABOLIC PANEL: CPT | Performed by: INTERNAL MEDICINE

## 2020-03-05 LAB
BACTERIA SPEC AEROBE CULT: ABNORMAL
STREP GROUPING: ABNORMAL

## 2020-03-23 DIAGNOSIS — I10 ESSENTIAL HYPERTENSION: Chronic | ICD-10-CM

## 2020-03-23 RX ORDER — HYDRALAZINE HYDROCHLORIDE 50 MG/1
TABLET, FILM COATED ORAL
Qty: 180 TABLET | Refills: 2 | Status: SHIPPED | OUTPATIENT
Start: 2020-03-23 | End: 2020-12-31

## 2020-04-29 DIAGNOSIS — M1A.0710 IDIOPATHIC CHRONIC GOUT OF RIGHT FOOT WITHOUT TOPHUS: Chronic | ICD-10-CM

## 2020-04-29 DIAGNOSIS — N18.1 CHRONIC RENAL IMPAIRMENT, STAGE 1: Chronic | ICD-10-CM

## 2020-04-30 RX ORDER — ALLOPURINOL 300 MG/1
300 TABLET ORAL DAILY
Qty: 90 TABLET | Refills: 0 | Status: SHIPPED | OUTPATIENT
Start: 2020-04-30 | End: 2020-06-25 | Stop reason: SDUPTHER

## 2020-06-22 DIAGNOSIS — K21.9 GASTROESOPHAGEAL REFLUX DISEASE WITHOUT ESOPHAGITIS: Chronic | ICD-10-CM

## 2020-06-22 DIAGNOSIS — I10 ESSENTIAL HYPERTENSION: Chronic | ICD-10-CM

## 2020-06-22 RX ORDER — LOSARTAN POTASSIUM 100 MG/1
TABLET ORAL
Qty: 90 TABLET | Refills: 3 | OUTPATIENT
Start: 2020-06-22

## 2020-06-22 RX ORDER — PANTOPRAZOLE SODIUM 40 MG/1
40 TABLET, DELAYED RELEASE ORAL DAILY
Qty: 90 TABLET | Refills: 3 | OUTPATIENT
Start: 2020-06-22

## 2020-06-22 RX ORDER — METOPROLOL SUCCINATE 50 MG/1
TABLET, EXTENDED RELEASE ORAL
Qty: 180 TABLET | Refills: 3 | OUTPATIENT
Start: 2020-06-22

## 2020-06-25 ENCOUNTER — OFFICE VISIT (OUTPATIENT)
Dept: FAMILY MEDICINE CLINIC | Facility: CLINIC | Age: 75
End: 2020-06-25

## 2020-06-25 VITALS
SYSTOLIC BLOOD PRESSURE: 130 MMHG | DIASTOLIC BLOOD PRESSURE: 72 MMHG | BODY MASS INDEX: 38.65 KG/M2 | WEIGHT: 255 LBS | HEIGHT: 68 IN | TEMPERATURE: 97.2 F | HEART RATE: 73 BPM

## 2020-06-25 DIAGNOSIS — M1A.0710 IDIOPATHIC CHRONIC GOUT OF RIGHT FOOT WITHOUT TOPHUS: Chronic | ICD-10-CM

## 2020-06-25 DIAGNOSIS — I50.32 CHRONIC HEART FAILURE WITH PRESERVED EJECTION FRACTION (HCC): Chronic | ICD-10-CM

## 2020-06-25 DIAGNOSIS — N18.30 CKD (CHRONIC KIDNEY DISEASE) STAGE 3, GFR 30-59 ML/MIN (HCC): Chronic | ICD-10-CM

## 2020-06-25 DIAGNOSIS — R73.02 IMPAIRED GLUCOSE TOLERANCE: Chronic | ICD-10-CM

## 2020-06-25 DIAGNOSIS — Z12.5 SCREENING FOR PROSTATE CANCER: ICD-10-CM

## 2020-06-25 DIAGNOSIS — E66.01 MORBIDLY OBESE (HCC): Chronic | ICD-10-CM

## 2020-06-25 DIAGNOSIS — N40.0 BENIGN PROSTATIC HYPERPLASIA WITHOUT LOWER URINARY TRACT SYMPTOMS: Chronic | ICD-10-CM

## 2020-06-25 DIAGNOSIS — Z00.00 INITIAL MEDICARE ANNUAL WELLNESS VISIT: Primary | ICD-10-CM

## 2020-06-25 DIAGNOSIS — I10 ESSENTIAL HYPERTENSION: Chronic | ICD-10-CM

## 2020-06-25 DIAGNOSIS — E78.2 MIXED HYPERLIPIDEMIA: Chronic | ICD-10-CM

## 2020-06-25 DIAGNOSIS — K21.9 GASTROESOPHAGEAL REFLUX DISEASE WITHOUT ESOPHAGITIS: Chronic | ICD-10-CM

## 2020-06-25 DIAGNOSIS — D50.8 IRON DEFICIENCY ANEMIA DUE TO DIETARY CAUSES: Chronic | ICD-10-CM

## 2020-06-25 PROBLEM — I82.412 ACUTE DEEP VEIN THROMBOSIS (DVT) OF FEMORAL VEIN OF LEFT LOWER EXTREMITY: Status: RESOLVED | Noted: 2018-12-28 | Resolved: 2020-06-25

## 2020-06-25 PROBLEM — I26.99 OTHER PULMONARY EMBOLISM WITHOUT ACUTE COR PULMONALE (HCC): Status: RESOLVED | Noted: 2018-12-28 | Resolved: 2020-06-25

## 2020-06-25 PROCEDURE — G0438 PPPS, INITIAL VISIT: HCPCS | Performed by: INTERNAL MEDICINE

## 2020-06-25 PROCEDURE — 96160 PT-FOCUSED HLTH RISK ASSMT: CPT | Performed by: INTERNAL MEDICINE

## 2020-06-25 RX ORDER — FINASTERIDE 5 MG/1
5 TABLET, FILM COATED ORAL DAILY
Qty: 90 TABLET | Refills: 3 | Status: SHIPPED | OUTPATIENT
Start: 2020-06-25 | End: 2021-03-25

## 2020-06-25 RX ORDER — LOSARTAN POTASSIUM 100 MG/1
100 TABLET ORAL DAILY
Qty: 90 TABLET | Refills: 3 | Status: SHIPPED | OUTPATIENT
Start: 2020-06-25 | End: 2021-06-28 | Stop reason: SDUPTHER

## 2020-06-25 RX ORDER — METOPROLOL SUCCINATE 50 MG/1
50 TABLET, EXTENDED RELEASE ORAL 2 TIMES DAILY
Qty: 180 TABLET | Refills: 3 | Status: SHIPPED | OUTPATIENT
Start: 2020-06-25 | End: 2021-06-28 | Stop reason: SDUPTHER

## 2020-06-25 RX ORDER — SPIRONOLACTONE 25 MG/1
TABLET ORAL
COMMUNITY
Start: 2020-04-02 | End: 2021-06-28 | Stop reason: SDUPTHER

## 2020-06-25 RX ORDER — ALLOPURINOL 300 MG/1
300 TABLET ORAL DAILY
Qty: 90 TABLET | Refills: 3 | Status: SHIPPED | OUTPATIENT
Start: 2020-06-25 | End: 2021-06-28 | Stop reason: SDUPTHER

## 2020-06-25 RX ORDER — PREDNISONE 20 MG/1
20 TABLET ORAL DAILY
COMMUNITY
End: 2020-06-25

## 2020-06-25 RX ORDER — PRAVASTATIN SODIUM 20 MG
20 TABLET ORAL EVERY EVENING
Qty: 90 TABLET | Refills: 3 | Status: SHIPPED | OUTPATIENT
Start: 2020-06-25 | End: 2021-06-28 | Stop reason: SDUPTHER

## 2020-06-25 NOTE — PROGRESS NOTES
Chief Complaint   Patient presents with   • Hypertension     6 mo f/u w labs   • Hyperlipidemia   • Medicare Wellness-Initial Visit     Subjective   Noe Barnes Sr. is a 74 y.o. male who presents to the office for follow-up and review of labs. He has impaired glucose tolerance and has been monitoring his dietary intake of sugars and carbohydrates.  He has hypertension and his blood pressure has been controlled.  He has GERD which is well controlled with Protonix.    He has hyperlipidemia and takes pravastatin daily.  He has iron deficient anemia and takes an iron supplement daily.  He has renal insufficiency, stage III and follows with nephrology.  His baseline creatinine runs around 1.4.  He takes finasteride for treatment of BPH.  He has chronic heart failure with a preserved ejection fraction.  He follows with cardiology.  His symptoms of been baseline.  He takes Lasix as needed for peripheral edema.    He had a DVT last year, and takes Pradaxa for anticoagulation.  He was previously taking Eliquis, but developed a rash which was thought to be secondary to this.  When he switched to Pradaxa, the rash resolved.  His cardiologist recently discontinued Pradaxa and started him on Xarelto.  He is tolerating this without any side effects.    The following portions of the patient's history were reviewed and updated as appropriate: allergies, current medications, past family history, past medical history, past social history, past surgical history and problem list.    Review of Systems   Constitutional: Negative for chills, fatigue and fever.   HENT: Negative for congestion, sneezing, sore throat and trouble swallowing.    Eyes: Negative for visual disturbance.   Respiratory: Negative for cough, chest tightness, shortness of breath and wheezing.    Cardiovascular: Negative for chest pain and palpitations.   Gastrointestinal: Negative for abdominal pain, constipation, diarrhea, nausea and vomiting.   Genitourinary:  "Negative for dysuria, frequency and urgency.   Musculoskeletal: Negative for neck pain.   Neurological: Negative for dizziness, weakness and headaches.   Psychiatric/Behavioral:        Patient denies any feelings of depression and has not felt down, hopeless or lost interest in any activities.   All other systems reviewed and are negative.      Objective   Vitals:    06/25/20 1005   BP: 130/72   BP Location: Left arm   Patient Position: Sitting   Cuff Size: Adult   Pulse: 73   Temp: 97.2 °F (36.2 °C)   TempSrc: Oral   Weight: 116 kg (255 lb)   Height: 172.7 cm (68\")   PainSc: 0-No pain      Body mass index is 38.77 kg/m².    Physical Exam   Constitutional: He is oriented to person, place, and time. He appears well-developed and well-nourished. No distress.   HENT:   Head: Normocephalic and atraumatic.   Nose: Nose normal.   Mouth/Throat: Oropharynx is clear and moist. No oropharyngeal exudate.   Eyes: Pupils are equal, round, and reactive to light. Conjunctivae and EOM are normal. No scleral icterus.   Neck: Normal range of motion. Neck supple.   Cardiovascular: Normal rate, regular rhythm and normal heart sounds. Exam reveals no gallop and no friction rub.   No murmur heard.  Pulmonary/Chest: Effort normal and breath sounds normal. No respiratory distress. He has no wheezes. He has no rales.   Abdominal: Soft. Bowel sounds are normal. He exhibits no distension. There is no tenderness. There is no rebound and no guarding.   Musculoskeletal: Normal range of motion. He exhibits no edema.   Lymphadenopathy:     He has no cervical adenopathy.   Neurological: He is alert and oriented to person, place, and time. No cranial nerve deficit.   Skin: Skin is warm and dry. No rash noted.   Psychiatric: He has a normal mood and affect. His behavior is normal. Judgment and thought content normal.   Nursing note and vitals reviewed.      Assessment/Plan   Noe was seen today for hypertension, hyperlipidemia and medicare " wellness-initial visit.    Diagnoses and all orders for this visit:    Initial Medicare annual wellness visit    Impaired glucose tolerance  -     Hemoglobin A1c; Future    Essential hypertension  -     Comprehensive Metabolic Panel; Future  -     T4, Free; Future  -     TSH; Future  -     losartan (COZAAR) 100 MG tablet; Take 1 tablet by mouth Daily.  -     metoprolol succinate XL (TOPROL-XL) 50 MG 24 hr tablet; Take 1 tablet by mouth 2 (Two) Times a Day.    Mixed hyperlipidemia  -     LDL Cholesterol, Direct; Future  -     Triglycerides; Future  -     pravastatin (PRAVACHOL) 20 MG tablet; Take 1 tablet by mouth Every Evening.    Gastroesophageal reflux disease without esophagitis    CKD (chronic kidney disease) stage 3, GFR 30-59 ml/min (CMS/HCC)  -     magnesium oxide (MAGOX) 400 (241.3 Mg) MG tablet tablet; Take 1 tablet by mouth Daily.    Benign prostatic hyperplasia without lower urinary tract symptoms  -     finasteride (PROSCAR) 5 MG tablet; Take 1 tablet by mouth Daily.    Iron deficiency anemia due to dietary causes  -     CBC & Differential; Future    Chronic heart failure with preserved ejection fraction (CMS/HCC)    Morbidly obese (CMS/HCC)    Idiopathic chronic gout of right foot without tophus  -     allopurinol (ZYLOPRIM) 300 MG tablet; Take 1 tablet by mouth Daily.    Screening for prostate cancer  -     PSA Screen; Future         Labs are reviewed with patient.  His glucose is elevated at 126.  His hemoglobin A1c is 6.40.  Patient will continue to watch diet to help maintain control of the glucose.  Patient understands that there is an increased risk of developing diabetes in the future.  His ALT and AST were mildly elevated at the last visit.  Those are now normal. His triglycerides are elevated at 206.  The remainder of his lipids are at goal.  He will continue with pravastatin for treatment of his hyperlipidemia.  His creatinine is elevated, but baseline at 1.37.  He will continue to follow with  nephrology.  He will continue with Protonix for treatment of GERD.  His blood pressure is controlled, and he will continue with his current blood pressure medication.  His anemia is stable, and He will continue to take the daily iron supplement.  His hemoglobin is 12.3, and his hematocrit is normal at 39.  He will continue with Pradaxa for anticoagulation secondary to the previous DVT and pulmonary embolus.    Patient's Body mass index is 38.77 kg/m². BMI is above normal parameters. Recommendations include: educational material.      PHQ-2/PHQ-9 Depression Screening 6/25/2020   Little interest or pleasure in doing things 0   Feeling down, depressed, or hopeless 0   Trouble falling or staying asleep, or sleeping too much -   Feeling tired or having little energy -   Poor appetite or overeating -   Feeling bad about yourself - or that you are a failure or have let yourself or your family down -   Trouble concentrating on things, such as reading the newspaper or watching television -   Moving or speaking so slowly that other people could have noticed. Or the opposite - being so fidgety or restless that you have been moving around a lot more than usual -   Thoughts that you would be better off dead, or of hurting yourself in some way -   Total Score 0   If you checked off any problems, how difficult have these problems made it for you to do your work, take care of things at home, or get along with other people? -         No visits with results within 2 Month(s) from this visit.   Latest known visit with results is:   Lab on 03/04/2020   Component Date Value Ref Range Status   • Glucose 03/04/2020 126* 70 - 99 mg/dL Final   • BUN 03/04/2020 31* 7 - 23 mg/dL Final   • Creatinine 03/04/2020 1.37* 0.70 - 1.30 mg/dL Final   • Sodium 03/04/2020 142  137 - 145 mmol/L Final   • Potassium 03/04/2020 4.2  3.4 - 5.0 mmol/L Final   • Chloride 03/04/2020 100* 101 - 112 mmol/L Final   • CO2 03/04/2020 33.0* 22.0 - 30.0 mmol/L Final    • Calcium 03/04/2020 9.1  8.4 - 10.2 mg/dL Final   • Total Protein 03/04/2020 7.2  6.3 - 8.6 g/dL Final   • Albumin 03/04/2020 3.90  3.50 - 5.00 g/dL Final   • ALT (SGPT) 03/04/2020 41  <=50 U/L Final   • AST (SGOT) 03/04/2020 46  17 - 59 U/L Final   • Alkaline Phosphatase 03/04/2020 80  38 - 126 U/L Final   • Total Bilirubin 03/04/2020 0.8  0.2 - 1.3 mg/dL Final   • eGFR Non African Amer 03/04/2020 51  42 - 98 mL/min/1.73 Final   • Globulin 03/04/2020 3.3  2.3 - 3.5 gm/dL Final   • A/G Ratio 03/04/2020 1.2  1.1 - 1.8 g/dL Final   • BUN/Creatinine Ratio 03/04/2020 22.6  7.0 - 25.0 Final   • Anion Gap 03/04/2020 9.0  5.0 - 15.0 mmol/L Final   • Free T4 03/04/2020 1.42  0.93 - 1.70 ng/dL Final   • TSH 03/04/2020 2.200  0.270 - 4.200 uIU/mL Final   • Color, UA 03/04/2020 Dark Yellow* Yellow, Straw Final   • Appearance, UA 03/04/2020 Cloudy* Clear Final   • pH, UA 03/04/2020 <=5.0* 5.5 - 8.0 Final   • Specific Gravity, UA 03/04/2020 1.025  1.005 - 1.030 Final   • Glucose, UA 03/04/2020 Negative  Negative Final   • Ketones, UA 03/04/2020 Negative  Negative Final   • Bilirubin, UA 03/04/2020 Negative  Negative Final   • Blood, UA 03/04/2020 Negative  Negative Final   • Protein, UA 03/04/2020 Negative  Negative Final   • Leuk Esterase, UA 03/04/2020 Trace* Negative Final   • Nitrite, UA 03/04/2020 Negative  Negative Final   • Urobilinogen, UA 03/04/2020 0.2 E.U./dL  0.2 - 1.0 E.U./dL Final   • Hemoglobin A1C 03/04/2020 6.40* 4.80 - 5.60 % Final   • Total Cholesterol 03/04/2020 165  150 - 200 mg/dL Final   • Triglycerides 03/04/2020 206* <=150 mg/dL Final   • HDL Cholesterol 03/04/2020 38* 40 - 59 mg/dL Final   • LDL Cholesterol  03/04/2020 86  <=100 mg/dL Final   • VLDL Cholesterol 03/04/2020 41.2  mg/dL Final   • LDL/HDL Ratio 03/04/2020 2.26  0.00 - 3.55 Final   • 25 Hydroxy, Vitamin D 03/04/2020 37.4  30.0 - 100.0 ng/ml Final   • Protein/Creatinine Ratio, Urine 03/04/2020 55.9  0.0 - 200.0 mg/G Crea Final   •  Creatinine, Urine 03/04/2020 196.7  mg/dL Final   • Total Protein, Urine 03/04/2020 11.0  mg/dL Final   • WBC 03/04/2020 8.59  3.40 - 10.80 10*3/mm3 Final   • RBC 03/04/2020 3.64* 4.14 - 5.80 10*6/mm3 Final   • Hemoglobin 03/04/2020 12.3* 13.0 - 17.7 g/dL Final   • Hematocrit 03/04/2020 39.0  37.5 - 51.0 % Final   • MCV 03/04/2020 107.1* 79.0 - 97.0 fL Final   • MCH 03/04/2020 33.8* 26.6 - 33.0 pg Final   • MCHC 03/04/2020 31.5  31.5 - 35.7 g/dL Final   • RDW 03/04/2020 14.6  12.3 - 15.4 % Final   • RDW-SD 03/04/2020 56.0* 37.0 - 54.0 fl Final   • MPV 03/04/2020 9.1  6.0 - 12.0 fL Final   • Platelets 03/04/2020 222  140 - 450 10*3/mm3 Final   • RBC, UA 03/04/2020 0-2* None Seen /HPF Final   • WBC, UA 03/04/2020 13-20* None Seen /HPF Final   • Bacteria, UA 03/04/2020 Trace* None Seen /HPF Final   • Squamous Epithelial Cells, UA 03/04/2020 0-2  None Seen, 0-2 /HPF Final   • Hyaline Casts, UA 03/04/2020 None Seen  None Seen /LPF Final   • Methodology 03/04/2020 Manual Light Microscopy   Final   • Scan Slide 03/04/2020    Final    See Manual Differential Results   • Urine Culture 03/04/2020 25,000 CFU/mL Streptococcus agalactiae (Group B)*  Final      This organism is considered to be universally susceptible to penicillin.  No further antibiotic testing will be performed.   • STREP GROUPING 03/04/2020 B   Final   • Neutrophil % 03/04/2020 50.0  42.7 - 76.0 % Final   • Lymphocyte % 03/04/2020 36.0  19.6 - 45.3 % Final   • Monocyte % 03/04/2020 7.0  5.0 - 12.0 % Final   • Eosinophil % 03/04/2020 4.0  0.3 - 6.2 % Final   • Bands %  03/04/2020 1.0  0.0 - 5.0 % Final   • Atypical Lymphocyte % 03/04/2020 2.0  0.0 - 5.0 % Final   • Neutrophils Absolute 03/04/2020 4.38  1.70 - 7.00 10*3/mm3 Final   • Lymphocytes Absolute 03/04/2020 3.09  0.70 - 3.10 10*3/mm3 Final   • Monocytes Absolute 03/04/2020 0.60  0.10 - 0.90 10*3/mm3 Final   • Eosinophils Absolute 03/04/2020 0.34  0.00 - 0.40 10*3/mm3 Final   • Hypochromia 03/04/2020  Slight/1+  None Seen Final   • Macrocytes 03/04/2020 Slight/1+  None Seen Final   • WBC Morphology 03/04/2020 Normal  Normal Final   • Platelet Estimate 03/04/2020 Adequate  Normal Final   ]        .

## 2020-06-25 NOTE — PROGRESS NOTES
The ABCs of the Annual Wellness Visit  Initial Medicare Wellness Visit    Chief Complaint   Patient presents with   • Hypertension     6 mo f/u w labs   • Hyperlipidemia   • Medicare Wellness-Initial Visit       Subjective   History of Present Illness:  Noe Barnes Sr. is a 74 y.o. male who presents for an Initial Medicare Wellness Visit.    HEALTH RISK ASSESSMENT    Recent Hospitalizations:  No hospitalization(s) within the last year.    Current Medical Providers:  Patient Care Team:  Elia Herron MD as PCP - General (Family Medicine)  Ben Recinos MD as Consulting Physician (Nephrology)  Ger Thomas MD as Surgeon (General Surgery)  Gabrielle Cao APRN (Dermatology)    Smoking Status:  Social History     Tobacco Use   Smoking Status Former Smoker   • Packs/day: 1.50   • Years: 60.00   • Pack years: 90.00   • Last attempt to quit: 2015   • Years since quittin.1   Smokeless Tobacco Never Used       Alcohol Consumption:  Social History     Substance and Sexual Activity   Alcohol Use No       Depression Screen:   PHQ-2/PHQ-9 Depression Screening 2020   Little interest or pleasure in doing things 0   Feeling down, depressed, or hopeless 0   Trouble falling or staying asleep, or sleeping too much -   Feeling tired or having little energy -   Poor appetite or overeating -   Feeling bad about yourself - or that you are a failure or have let yourself or your family down -   Trouble concentrating on things, such as reading the newspaper or watching television -   Moving or speaking so slowly that other people could have noticed. Or the opposite - being so fidgety or restless that you have been moving around a lot more than usual -   Thoughts that you would be better off dead, or of hurting yourself in some way -   Total Score 0   If you checked off any problems, how difficult have these problems made it for you to do your work, take care of things at home, or get along with other  people? -       Fall Risk Screen:  SUGEY Fall Risk Assessment was completed, and patient is at HIGH risk for falls. Assessment completed on:6/25/2020    Health Habits and Functional and Cognitive Screening:  Functional & Cognitive Status 6/25/2020   Do you have difficulty preparing food and eating? No   Do you have difficulty bathing yourself, getting dressed or grooming yourself? No   Do you have difficulty using the toilet? No   Do you have difficulty moving around from place to place? No   Do you have trouble with steps or getting out of a bed or a chair? No   Current Diet Well Balanced Diet   Dental Exam Not up to date   Eye Exam Not up to date   Exercise (times per week) 6 times per week   Current Exercise Activities Include Yard Work   Do you need help using the phone?  No   Are you deaf or do you have serious difficulty hearing?  No   Do you need help with transportation? No   Do you need help shopping? No   Do you need help preparing meals?  No   Do you need help with housework?  No   Do you need help with laundry? No   Do you need help taking your medications? No   Do you need help managing money? No   Do you ever drive or ride in a car without wearing a seat belt? No   Have you felt unusual stress, anger or loneliness in the last month? No   Who do you live with? Spouse   If you need help, do you have trouble finding someone available to you? No   Have you been bothered in the last four weeks by sexual problems? No   Do you have difficulty concentrating, remembering or making decisions? No         Does the patient have evidence of cognitive impairment? No    Asprin use counseling:Takes Xarelto    Age-appropriate Screening Schedule:  Refer to the list below for future screening recommendations based on patient's age, sex and/or medical conditions. Orders for these recommended tests are listed in the plan section. The patient has been provided with a written plan.    Health Maintenance   Topic Date Due   •  TDAP/TD VACCINES (1 - Tdap) 12/15/1956   • ZOSTER VACCINE (1 of 2) 12/15/1997   • COLONOSCOPY  11/26/2019   • PROSTATE CANCER SCREENING  05/09/2020   • INFLUENZA VACCINE  08/01/2020   • LIPID PANEL  03/04/2021          The following portions of the patient's history were reviewed and updated as appropriate: allergies, current medications, past family history, past medical history, past social history, past surgical history and problem list.    Outpatient Medications Prior to Visit   Medication Sig Dispense Refill   • Cyanocobalamin (B-12) 1000 MCG capsule Take 1 capsule by mouth Daily. 30 capsule 0   • ferrous sulfate 325 (65 FE) MG tablet Take 325 mg by mouth Daily With Breakfast.     • fluticasone (FLONASE) 50 MCG/ACT nasal spray 2 sprays into the nostril(s) as directed by provider Daily As Needed for Rhinitis or Allergies.     • furosemide (LASIX) 20 MG tablet Take 1 tablet by mouth Daily. (Patient taking differently: Take 20 mg by mouth 2 (Two) Times a Day.) 90 tablet 1   • hydrALAZINE (APRESOLINE) 50 MG tablet TAKE 1 TABLET BY MOUTH TWO TIMES A  tablet 2   • isosorbide mononitrate (IMDUR) 60 MG 24 hr tablet Take 60 mg by mouth Daily.  0   • nystatin (MYCOSTATIN) 297380 UNIT/GM ointment Apply  topically 3 (Three) Times a Day. 30 g 5   • pantoprazole (PROTONIX) 40 MG EC tablet TAKE 1 TABLET BY MOUTH DAILY 90 tablet 3   • rivaroxaban (XARELTO) 10 MG tablet Take 10 mg by mouth Daily.     • spironolactone (ALDACTONE) 25 MG tablet TAKE 1 TABLET BY MOUTH DAILY     • tolnaftate (TINACTIN) 1 % cream Apply 1 application topically 2 (Two) Times a Day.     • triamcinolone (KENALOG) 0.1 % cream Apply  topically to the appropriate area as directed 2 (Two) Times a Day. 80 g 2   • allopurinol (ZYLOPRIM) 300 MG tablet TAKE 1 TABLET BY MOUTH DAILY 90 tablet 0   • finasteride (PROSCAR) 5 MG tablet Take 1 tablet by mouth Daily. 90 tablet 1   • losartan (COZAAR) 100 MG tablet TAKE 1 TABLET BY MOUTH DAILY **REPLACES  LOSARTAN/HCTZ** 90 tablet 3   • magnesium oxide (MAGOX) 400 (241.3 Mg) MG tablet tablet TAKE 1 TABLET BY MOUTH DAILY 90 tablet 0   • metoprolol succinate XL (TOPROL-XL) 50 MG 24 hr tablet TAKE 1 TABLET BY MOUTH TWO TIMES A  tablet 3   • PRADAXA 150 MG capsu TAKE 1 CAPSULE BY MOUTH TWO TIMES A DAY 60 capsule 5   • pravastatin (PRAVACHOL) 20 MG tablet TAKE 1 TABLET BY MOUTH EVERY EVENING 90 tablet 3   • metoprolol succinate XL (TOPROL-XL) 50 MG 24 hr tablet Take 50 mg by mouth 2 (Two) Times a Day.  4   • predniSONE (DELTASONE) 20 MG tablet Take 20 mg by mouth Daily.       No facility-administered medications prior to visit.        Patient Active Problem List   Diagnosis   • Mixed hyperlipidemia   • Iron deficiency anemia due to dietary causes   • Impaired glucose tolerance   • Gastroesophageal reflux disease without esophagitis   • Essential hypertension   • Elevated PSA   • Colon polyps   • Idiopathic chronic gout of right foot without tophus   • History of adenomatous polyp of colon   • Yeast infection of the skin   • Peripheral edema   • CKD (chronic kidney disease) stage 3, GFR 30-59 ml/min (CMS/HCC)   • Benign prostatic hyperplasia without lower urinary tract symptoms   • (HFpEF) heart failure with preserved ejection fraction (CMS/HCC)   • Morbidly obese (CMS/HCC)   • Palpitations       Advanced Care Planning:  ACP discussion was held with the patient during this visit. Patient does not have an advance directive, declines further assistance.    Review of Systems    Compared to one year ago, the patient feels his physical health is the same.  Compared to one year ago, the patient feels his mental health is the same.    Reviewed chart for potential of high risk medication in the elderly: yes  Reviewed chart for potential of harmful drug interactions in the elderly:yes    Objective         Vitals:    06/25/20 1005   BP: 130/72   BP Location: Left arm   Patient Position: Sitting   Cuff Size: Adult   Pulse: 73  "  Temp: 97.2 °F (36.2 °C)   TempSrc: Oral   Weight: 116 kg (255 lb)   Height: 172.7 cm (68\")   PainSc: 0-No pain       Body mass index is 38.77 kg/m².  Discussed the patient's BMI with him. The BMI is above average; BMI management plan is completed.    Physical Exam          Assessment/Plan   Medicare Risks and Personalized Health Plan  CMS Preventative Services Quick Reference  Cardiovascular risk  Diabetic Lab Screening   Immunizations Discussed/Encouraged (specific immunizations; adacel Tdap and Shingrix )  Obesity/Overweight   Prostate Cancer Screening     The above risks/problems have been discussed with the patient.  Pertinent information has been shared with the patient in the After Visit Summary.  Follow up plans and orders are seen below in the Assessment/Plan Section.    Diagnoses and all orders for this visit:    1. Initial Medicare annual wellness visit (Primary)    2. Impaired glucose tolerance  -     Hemoglobin A1c; Future    3. Essential hypertension  -     Comprehensive Metabolic Panel; Future  -     T4, Free; Future  -     TSH; Future  -     losartan (COZAAR) 100 MG tablet; Take 1 tablet by mouth Daily.  Dispense: 90 tablet; Refill: 3  -     metoprolol succinate XL (TOPROL-XL) 50 MG 24 hr tablet; Take 1 tablet by mouth 2 (Two) Times a Day.  Dispense: 180 tablet; Refill: 3    4. Mixed hyperlipidemia  -     LDL Cholesterol, Direct; Future  -     Triglycerides; Future  -     pravastatin (PRAVACHOL) 20 MG tablet; Take 1 tablet by mouth Every Evening.  Dispense: 90 tablet; Refill: 3    5. Gastroesophageal reflux disease without esophagitis    6. CKD (chronic kidney disease) stage 3, GFR 30-59 ml/min (CMS/[facility])  -     magnesium oxide (MAGOX) 400 (241.3 Mg) MG tablet tablet; Take 1 tablet by mouth Daily.  Dispense: 90 tablet; Refill: 3    7. Benign prostatic hyperplasia without lower urinary tract symptoms  -     finasteride (PROSCAR) 5 MG tablet; Take 1 tablet by mouth Daily.  Dispense: 90 tablet; Refill: " 3    8. Iron deficiency anemia due to dietary causes  -     CBC & Differential; Future    9. Chronic heart failure with preserved ejection fraction (CMS/HCC)    10. Morbidly obese (CMS/HCC)    11. Screening for prostate cancer  -     PSA Screen; Future    12. Idiopathic chronic gout of right foot without tophus  -     allopurinol (ZYLOPRIM) 300 MG tablet; Take 1 tablet by mouth Daily.  Dispense: 90 tablet; Refill: 3    13. Chronic renal impairment, stage 1      Follow Up:  Return in about 6 months (around 12/25/2020) for Next scheduled follow up, Or sooner as needed With Labs prior to appointment.     An After Visit Summary and PPPS were given to the patient.

## 2020-09-24 DIAGNOSIS — K21.9 GASTROESOPHAGEAL REFLUX DISEASE WITHOUT ESOPHAGITIS: Chronic | ICD-10-CM

## 2020-09-24 RX ORDER — PANTOPRAZOLE SODIUM 40 MG/1
40 TABLET, DELAYED RELEASE ORAL DAILY
Qty: 90 TABLET | Refills: 3 | Status: SHIPPED | OUTPATIENT
Start: 2020-09-24 | End: 2021-06-21

## 2020-12-21 ENCOUNTER — LAB (OUTPATIENT)
Dept: LAB | Facility: OTHER | Age: 75
End: 2020-12-21

## 2020-12-21 DIAGNOSIS — E78.2 MIXED HYPERLIPIDEMIA: Chronic | ICD-10-CM

## 2020-12-21 DIAGNOSIS — R73.02 IMPAIRED GLUCOSE TOLERANCE: Chronic | ICD-10-CM

## 2020-12-21 DIAGNOSIS — I10 ESSENTIAL HYPERTENSION: Chronic | ICD-10-CM

## 2020-12-21 DIAGNOSIS — D50.8 IRON DEFICIENCY ANEMIA DUE TO DIETARY CAUSES: ICD-10-CM

## 2020-12-21 DIAGNOSIS — Z12.5 SCREENING FOR PROSTATE CANCER: ICD-10-CM

## 2020-12-21 LAB
ALBUMIN SERPL-MCNC: 3.9 G/DL (ref 3.5–5)
ALBUMIN/GLOB SERPL: 1.1 G/DL (ref 1.1–1.8)
ALP SERPL-CCNC: 91 U/L (ref 38–126)
ALT SERPL W P-5'-P-CCNC: 33 U/L
ANION GAP SERPL CALCULATED.3IONS-SCNC: 4 MMOL/L (ref 5–15)
ARTICHOKE IGE QN: 92 MG/DL (ref 0–100)
AST SERPL-CCNC: 46 U/L (ref 17–59)
BILIRUB SERPL-MCNC: 0.9 MG/DL (ref 0.2–1.3)
BUN SERPL-MCNC: 28 MG/DL (ref 7–23)
BUN/CREAT SERPL: 21.9 (ref 7–25)
CALCIUM SPEC-SCNC: 9.5 MG/DL (ref 8.4–10.2)
CHLORIDE SERPL-SCNC: 98 MMOL/L (ref 101–112)
CO2 SERPL-SCNC: 35 MMOL/L (ref 22–30)
CREAT SERPL-MCNC: 1.28 MG/DL (ref 0.7–1.3)
DEPRECATED RDW RBC AUTO: 57.7 FL (ref 37–54)
EOSINOPHIL # BLD MANUAL: 0.37 10*3/MM3 (ref 0–0.4)
EOSINOPHIL NFR BLD MANUAL: 4 % (ref 0.3–6.2)
ERYTHROCYTE [DISTWIDTH] IN BLOOD BY AUTOMATED COUNT: 15.3 % (ref 12.3–15.4)
GFR SERPL CREATININE-BSD FRML MDRD: 55 ML/MIN/1.73 (ref 42–98)
GLOBULIN UR ELPH-MCNC: 3.5 GM/DL (ref 2.3–3.5)
GLUCOSE SERPL-MCNC: 128 MG/DL (ref 70–99)
HBA1C MFR BLD: 6.73 % (ref 4.8–5.6)
HCT VFR BLD AUTO: 38.2 % (ref 37.5–51)
HGB BLD-MCNC: 12.3 G/DL (ref 13–17.7)
LYMPHOCYTES # BLD MANUAL: 3.63 10*3/MM3 (ref 0.7–3.1)
LYMPHOCYTES NFR BLD MANUAL: 39 % (ref 19.6–45.3)
LYMPHOCYTES NFR BLD MANUAL: 9 % (ref 5–12)
MACROCYTES BLD QL SMEAR: ABNORMAL
MCH RBC QN AUTO: 33.9 PG (ref 26.6–33)
MCHC RBC AUTO-ENTMCNC: 32.2 G/DL (ref 31.5–35.7)
MCV RBC AUTO: 105.2 FL (ref 79–97)
MONOCYTES # BLD AUTO: 0.84 10*3/MM3 (ref 0.1–0.9)
NEUTROPHILS # BLD AUTO: 4.46 10*3/MM3 (ref 1.7–7)
NEUTROPHILS NFR BLD MANUAL: 48 % (ref 42.7–76)
PLATELET # BLD AUTO: 232 10*3/MM3 (ref 140–450)
PMV BLD AUTO: 9.1 FL (ref 6–12)
POTASSIUM SERPL-SCNC: 3.8 MMOL/L (ref 3.4–5)
PROT SERPL-MCNC: 7.4 G/DL (ref 6.3–8.6)
PSA SERPL-MCNC: 0.45 NG/ML (ref 0–4)
RBC # BLD AUTO: 3.63 10*6/MM3 (ref 4.14–5.8)
SCAN SLIDE: NORMAL
SMALL PLATELETS BLD QL SMEAR: ADEQUATE
SODIUM SERPL-SCNC: 137 MMOL/L (ref 137–145)
T4 FREE SERPL-MCNC: 1.34 NG/DL (ref 0.93–1.7)
TRIGL SERPL-MCNC: 236 MG/DL
TSH SERPL DL<=0.05 MIU/L-ACNC: 2.42 UIU/ML (ref 0.27–4.2)
WBC # BLD AUTO: 9.3 10*3/MM3 (ref 3.4–10.8)
WBC MORPH BLD: NORMAL

## 2020-12-21 PROCEDURE — 84478 ASSAY OF TRIGLYCERIDES: CPT | Performed by: INTERNAL MEDICINE

## 2020-12-21 PROCEDURE — 84443 ASSAY THYROID STIM HORMONE: CPT | Performed by: INTERNAL MEDICINE

## 2020-12-21 PROCEDURE — 84439 ASSAY OF FREE THYROXINE: CPT | Performed by: INTERNAL MEDICINE

## 2020-12-21 PROCEDURE — 83036 HEMOGLOBIN GLYCOSYLATED A1C: CPT | Performed by: INTERNAL MEDICINE

## 2020-12-21 PROCEDURE — 80053 COMPREHEN METABOLIC PANEL: CPT | Performed by: INTERNAL MEDICINE

## 2020-12-21 PROCEDURE — G0103 PSA SCREENING: HCPCS | Performed by: INTERNAL MEDICINE

## 2020-12-21 PROCEDURE — 83721 ASSAY OF BLOOD LIPOPROTEIN: CPT | Performed by: INTERNAL MEDICINE

## 2020-12-21 PROCEDURE — 85025 COMPLETE CBC W/AUTO DIFF WBC: CPT | Performed by: INTERNAL MEDICINE

## 2020-12-21 PROCEDURE — 36415 COLL VENOUS BLD VENIPUNCTURE: CPT | Performed by: INTERNAL MEDICINE

## 2020-12-28 ENCOUNTER — OFFICE VISIT (OUTPATIENT)
Dept: FAMILY MEDICINE CLINIC | Facility: CLINIC | Age: 75
End: 2020-12-28

## 2020-12-28 VITALS
DIASTOLIC BLOOD PRESSURE: 78 MMHG | HEIGHT: 68 IN | HEART RATE: 71 BPM | WEIGHT: 251 LBS | BODY MASS INDEX: 38.04 KG/M2 | SYSTOLIC BLOOD PRESSURE: 138 MMHG | TEMPERATURE: 98.4 F | OXYGEN SATURATION: 99 %

## 2020-12-28 DIAGNOSIS — N18.31 CHRONIC RENAL IMPAIRMENT, STAGE 3A (HCC): Chronic | ICD-10-CM

## 2020-12-28 DIAGNOSIS — N40.0 BENIGN PROSTATIC HYPERPLASIA WITHOUT LOWER URINARY TRACT SYMPTOMS: Chronic | ICD-10-CM

## 2020-12-28 DIAGNOSIS — Z23 INFLUENZA VACCINATION ADMINISTERED AT CURRENT VISIT: ICD-10-CM

## 2020-12-28 DIAGNOSIS — I50.32 CHRONIC HEART FAILURE WITH PRESERVED EJECTION FRACTION (HCC): Chronic | ICD-10-CM

## 2020-12-28 DIAGNOSIS — E11.8 TYPE 2 DIABETES MELLITUS WITH COMPLICATION, WITHOUT LONG-TERM CURRENT USE OF INSULIN (HCC): Primary | Chronic | ICD-10-CM

## 2020-12-28 DIAGNOSIS — I10 ESSENTIAL HYPERTENSION: Chronic | ICD-10-CM

## 2020-12-28 DIAGNOSIS — D50.8 IRON DEFICIENCY ANEMIA DUE TO DIETARY CAUSES: Chronic | ICD-10-CM

## 2020-12-28 DIAGNOSIS — K21.9 GASTROESOPHAGEAL REFLUX DISEASE WITHOUT ESOPHAGITIS: Chronic | ICD-10-CM

## 2020-12-28 DIAGNOSIS — E78.2 MIXED HYPERLIPIDEMIA: Chronic | ICD-10-CM

## 2020-12-28 DIAGNOSIS — M1A.0710 IDIOPATHIC CHRONIC GOUT OF RIGHT FOOT WITHOUT TOPHUS: Chronic | ICD-10-CM

## 2020-12-28 PROCEDURE — 99214 OFFICE O/P EST MOD 30 MIN: CPT | Performed by: INTERNAL MEDICINE

## 2020-12-28 PROCEDURE — G0008 ADMIN INFLUENZA VIRUS VAC: HCPCS | Performed by: INTERNAL MEDICINE

## 2020-12-28 PROCEDURE — 90694 VACC AIIV4 NO PRSRV 0.5ML IM: CPT | Performed by: INTERNAL MEDICINE

## 2020-12-28 RX ORDER — ICOSAPENT ETHYL 1000 MG/1
2 CAPSULE ORAL 2 TIMES DAILY
COMMUNITY
Start: 2020-07-07

## 2020-12-28 NOTE — PROGRESS NOTES
Chief Complaint   Patient presents with   • Hypertension     6 month f/u on labs    • Hyperlipidemia     Subjective   Noe Barnes Sr. is a 75 y.o. male who presents to the office for follow-up and review of labs.  He has impaired glucose tolerance and monitors his dietary intake of sugar and carbohydrates.  He has hypertension and his blood pressure has been doing well.  He has chronic heart failure with a preserved ejection fraction.  His symptoms have been baseline.  He follows with cardiology.  His cardiologist recently started him on Vascepa.  He felt some swelling in his throat, so he discontinued this medication.  He takes Lasix as needed for peripheral edema.  He has GERD which is well controlled with Protonix.  He has hyperlipidemia and takes pravastatin daily.  He has iron deficient anemia and takes a daily iron supplement.  He has chronic renal insufficiency, stage 3a, and follows with nephrology.  His baseline creatinine runs around 1.4.  He has BPH and takes finasteride.    The following portions of the patient's history were reviewed and updated as appropriate: allergies, current medications, past family history, past medical history, past social history, past surgical history and problem list.    Review of Systems   Constitutional: Negative for chills, fatigue and fever.   HENT: Negative for congestion, sneezing, sore throat and trouble swallowing.    Eyes: Negative for visual disturbance.   Respiratory: Negative for cough, chest tightness, shortness of breath and wheezing.    Cardiovascular: Negative for chest pain and palpitations.   Gastrointestinal: Negative for abdominal pain, constipation, diarrhea, nausea and vomiting.   Genitourinary: Negative for dysuria, frequency and urgency.   Musculoskeletal: Negative for neck pain.   Neurological: Negative for dizziness, weakness and headaches.   Psychiatric/Behavioral:        Patient denies any feelings of depression and has not felt down, hopeless  "or lost interest in any activities.   All other systems reviewed and are negative.  Review of systems has been reviewed and validated on 12/28/2020and updated with any changes.      Objective   Vitals:    12/28/20 1030   BP: 138/78   Pulse: 71   Temp: 98.4 °F (36.9 °C)   TempSrc: Oral   SpO2: 99%   Weight: 114 kg (251 lb)   Height: 172.7 cm (68\")   PainSc: 0-No pain   PainLoc: Generalized      Body mass index is 38.16 kg/m².    Physical Exam   Constitutional: He is oriented to person, place, and time. He appears well-developed. No distress.   HENT:   Head: Normocephalic and atraumatic.   Nose: Nose normal.   Mouth/Throat: No oropharyngeal exudate.   Eyes: Pupils are equal, round, and reactive to light. Conjunctivae are normal. No scleral icterus.   Neck: Normal range of motion. Neck supple.   Cardiovascular: Normal rate, regular rhythm and normal heart sounds. Exam reveals no gallop and no friction rub.   No murmur heard.  Pulmonary/Chest: Effort normal and breath sounds normal. No respiratory distress. He has no wheezes. He has no rales.   Abdominal: Soft. Bowel sounds are normal. He exhibits no distension. There is no abdominal tenderness. There is no rebound and no guarding.   Musculoskeletal: Normal range of motion.   Lymphadenopathy:     He has no cervical adenopathy.   Neurological: He is alert and oriented to person, place, and time. No cranial nerve deficit.   Skin: Skin is warm and dry. No rash noted.   Psychiatric: His behavior is normal. Judgment and thought content normal.   Nursing note and vitals reviewed.  Physical exam has been reviewed and validated on 12/28/2020and updated with any changes.      Assessment/Plan   Diagnoses and all orders for this visit:    1. Type 2 diabetes mellitus with complication, without long-term current use of insulin (CMS/[facility]) (Primary)  -     Hemoglobin A1c; Future    2. Essential hypertension  -     Comprehensive Metabolic Panel; Future  -     T4, Free; Future  -     " TSH; Future    3. Mixed hyperlipidemia  -     Lipid Panel; Future    4. Chronic heart failure with preserved ejection fraction (CMS/HCC)    5. Gastroesophageal reflux disease without esophagitis    6. Idiopathic chronic gout of right foot without tophus    7. Chronic renal impairment, stage 3a    8. Benign prostatic hyperplasia without lower urinary tract symptoms    9. Iron deficiency anemia due to dietary causes  -     CBC & Differential; Future    10. Influenza vaccination administered at current visit  -     Fluad Quad >65 years         Labs are reviewed with patient.  Patient's glucose is elevated at 128.  His hemoglobin A1c is 6.73.  He now meets criteria for diagnosis of diabetes.  He may continue with dietary management of the diabetes for now.  I discussed dietary measures to help with control of the blood sugar.  He may monitor blood sugar 1 time daily.  His creatinine is elevated but baseline at 1.28.  He will continue to follow with nephrology.  His LDL is 92 and triglycerides 236.  He will continue with pravastatin for treatment of hyperlipidemia.  He will continue with Protonix for treatment of GERD.  His blood pressure is controlled, and he will continue with his current blood pressure medication.  His anemia is stable with a hemoglobin of 12.3 and hematocrit of 38.2.  He will continue with the daily iron supplement for treatment of his anemia.  His PSA is normal at 0.447.  He will continue with finasteride for treatment of BPH.  He will continue with Xarelto for anticoagulation secondary to prior history of DVT and pulmonary embolus.    He will follow-up with his cardiologist regarding the side effects from the Vascepa.  He will continue to hold off on the medication for now.    Patient is given a flu shot today.    PHQ-2/PHQ-9 Depression Screening 12/28/2020   Little interest or pleasure in doing things 0   Feeling down, depressed, or hopeless 0   Trouble falling or staying asleep, or sleeping too  much -   Feeling tired or having little energy -   Poor appetite or overeating -   Feeling bad about yourself - or that you are a failure or have let yourself or your family down -   Trouble concentrating on things, such as reading the newspaper or watching television -   Moving or speaking so slowly that other people could have noticed. Or the opposite - being so fidgety or restless that you have been moving around a lot more than usual -   Thoughts that you would be better off dead, or of hurting yourself in some way -   Total Score 0   If you checked off any problems, how difficult have these problems made it for you to do your work, take care of things at home, or get along with other people? -         Lab on 12/21/2020   Component Date Value Ref Range Status   • Glucose 12/21/2020 128* 70 - 99 mg/dL Final   • BUN 12/21/2020 28* 7 - 23 mg/dL Final   • Creatinine 12/21/2020 1.28  0.70 - 1.30 mg/dL Final   • Sodium 12/21/2020 137  137 - 145 mmol/L Final   • Potassium 12/21/2020 3.8  3.4 - 5.0 mmol/L Final   • Chloride 12/21/2020 98* 101 - 112 mmol/L Final   • CO2 12/21/2020 35.0* 22.0 - 30.0 mmol/L Final   • Calcium 12/21/2020 9.5  8.4 - 10.2 mg/dL Final   • Total Protein 12/21/2020 7.4  6.3 - 8.6 g/dL Final   • Albumin 12/21/2020 3.90  3.50 - 5.00 g/dL Final   • ALT (SGPT) 12/21/2020 33  <=50 U/L Final   • AST (SGOT) 12/21/2020 46  17 - 59 U/L Final   • Alkaline Phosphatase 12/21/2020 91  38 - 126 U/L Final   • Total Bilirubin 12/21/2020 0.9  0.2 - 1.3 mg/dL Final   • eGFR Non African Amer 12/21/2020 55  42 - 98 mL/min/1.73 Final   • Globulin 12/21/2020 3.5  2.3 - 3.5 gm/dL Final   • A/G Ratio 12/21/2020 1.1  1.1 - 1.8 g/dL Final   • BUN/Creatinine Ratio 12/21/2020 21.9  7.0 - 25.0 Final   • Anion Gap 12/21/2020 4.0* 5.0 - 15.0 mmol/L Final   • Free T4 12/21/2020 1.34  0.93 - 1.70 ng/dL Final   • TSH 12/21/2020 2.420  0.270 - 4.200 uIU/mL Final   • Hemoglobin A1C 12/21/2020 6.73* 4.80 - 5.60 % Final   • LDL  Cholesterol  12/21/2020 92  0 - 100 mg/dL Final   • Triglycerides 12/21/2020 236* <=150 mg/dL Final   • PSA 12/21/2020 0.447  0.000 - 4.000 ng/mL Final   • WBC 12/21/2020 9.30  3.40 - 10.80 10*3/mm3 Final   • RBC 12/21/2020 3.63* 4.14 - 5.80 10*6/mm3 Final   • Hemoglobin 12/21/2020 12.3* 13.0 - 17.7 g/dL Final   • Hematocrit 12/21/2020 38.2  37.5 - 51.0 % Final   • MCV 12/21/2020 105.2* 79.0 - 97.0 fL Final   • MCH 12/21/2020 33.9* 26.6 - 33.0 pg Final   • MCHC 12/21/2020 32.2  31.5 - 35.7 g/dL Final   • RDW 12/21/2020 15.3  12.3 - 15.4 % Final   • RDW-SD 12/21/2020 57.7* 37.0 - 54.0 fl Final   • MPV 12/21/2020 9.1  6.0 - 12.0 fL Final   • Platelets 12/21/2020 232  140 - 450 10*3/mm3 Final   • Scan Slide 12/21/2020    Final    See Manual Differential Results   • Neutrophil % 12/21/2020 48.0  42.7 - 76.0 % Final   • Lymphocyte % 12/21/2020 39.0  19.6 - 45.3 % Final   • Monocyte % 12/21/2020 9.0  5.0 - 12.0 % Final   • Eosinophil % 12/21/2020 4.0  0.3 - 6.2 % Final   • Neutrophils Absolute 12/21/2020 4.46  1.70 - 7.00 10*3/mm3 Final   • Lymphocytes Absolute 12/21/2020 3.63* 0.70 - 3.10 10*3/mm3 Final   • Monocytes Absolute 12/21/2020 0.84  0.10 - 0.90 10*3/mm3 Final   • Eosinophils Absolute 12/21/2020 0.37  0.00 - 0.40 10*3/mm3 Final   • Macrocytes 12/21/2020 Slight/1+  None Seen Final   • WBC Morphology 12/21/2020 Normal  Normal Final   • Platelet Estimate 12/21/2020 Adequate  Normal Final   ]        .

## 2020-12-31 DIAGNOSIS — I10 ESSENTIAL HYPERTENSION: Chronic | ICD-10-CM

## 2020-12-31 RX ORDER — HYDRALAZINE HYDROCHLORIDE 50 MG/1
TABLET, FILM COATED ORAL
Qty: 180 TABLET | Refills: 2 | Status: SHIPPED | OUTPATIENT
Start: 2020-12-31 | End: 2021-10-04

## 2021-03-09 ENCOUNTER — TRANSCRIBE ORDERS (OUTPATIENT)
Dept: LAB | Facility: OTHER | Age: 76
End: 2021-03-09

## 2021-03-09 ENCOUNTER — LAB (OUTPATIENT)
Dept: LAB | Facility: OTHER | Age: 76
End: 2021-03-09

## 2021-03-09 DIAGNOSIS — I10 HYPERTENSION, ESSENTIAL: ICD-10-CM

## 2021-03-09 DIAGNOSIS — D64.9 ANEMIA, UNSPECIFIED TYPE: ICD-10-CM

## 2021-03-09 DIAGNOSIS — N39.0 URINARY TRACT INFECTION WITH HEMATURIA, SITE UNSPECIFIED: ICD-10-CM

## 2021-03-09 DIAGNOSIS — N28.1 COMPLEX RENAL CYST: ICD-10-CM

## 2021-03-09 DIAGNOSIS — E78.5 HYPERLIPIDEMIA, UNSPECIFIED HYPERLIPIDEMIA TYPE: ICD-10-CM

## 2021-03-09 DIAGNOSIS — R31.9 URINARY TRACT INFECTION WITH HEMATURIA, SITE UNSPECIFIED: ICD-10-CM

## 2021-03-09 DIAGNOSIS — N18.9 CHRONIC KIDNEY DISEASE, UNSPECIFIED CKD STAGE: ICD-10-CM

## 2021-03-09 DIAGNOSIS — I12.9 HYPERTENSIVE NEPHROPATHY: ICD-10-CM

## 2021-03-09 DIAGNOSIS — E83.42 HYPOMAGNESEMIA: ICD-10-CM

## 2021-03-09 DIAGNOSIS — N18.30 STAGE 3 CHRONIC KIDNEY DISEASE, UNSPECIFIED WHETHER STAGE 3A OR 3B CKD (HCC): ICD-10-CM

## 2021-03-09 DIAGNOSIS — E79.0 HYPERURICEMIA: ICD-10-CM

## 2021-03-09 DIAGNOSIS — N18.30 STAGE 3 CHRONIC KIDNEY DISEASE, UNSPECIFIED WHETHER STAGE 3A OR 3B CKD (HCC): Primary | ICD-10-CM

## 2021-03-09 LAB
25(OH)D3 SERPL-MCNC: 37 NG/ML (ref 30–100)
ALBUMIN SERPL-MCNC: 4 G/DL (ref 3.5–5)
ANION GAP SERPL CALCULATED.3IONS-SCNC: 9 MMOL/L (ref 5–15)
BASOPHILS # BLD AUTO: 0.03 10*3/MM3 (ref 0–0.2)
BASOPHILS NFR BLD AUTO: 0.3 % (ref 0–1.5)
BUN SERPL-MCNC: 37 MG/DL (ref 7–23)
BUN/CREAT SERPL: 24 (ref 7–25)
CALCIUM SPEC-SCNC: 9 MG/DL (ref 8.4–10.2)
CHLORIDE SERPL-SCNC: 95 MMOL/L (ref 101–112)
CO2 SERPL-SCNC: 33 MMOL/L (ref 22–30)
CREAT SERPL-MCNC: 1.54 MG/DL (ref 0.7–1.3)
CREAT UR-MCNC: 149.2 MG/DL
DEPRECATED RDW RBC AUTO: 57 FL (ref 37–54)
EOSINOPHIL # BLD AUTO: 0.34 10*3/MM3 (ref 0–0.4)
EOSINOPHIL NFR BLD AUTO: 3.3 % (ref 0.3–6.2)
ERYTHROCYTE [DISTWIDTH] IN BLOOD BY AUTOMATED COUNT: 15.3 % (ref 12.3–15.4)
GFR SERPL CREATININE-BSD FRML MDRD: 45 ML/MIN/1.73 (ref 42–98)
GLUCOSE SERPL-MCNC: 141 MG/DL (ref 70–99)
HCT VFR BLD AUTO: 36.5 % (ref 37.5–51)
HGB BLD-MCNC: 12.3 G/DL (ref 13–17.7)
LYMPHOCYTES # BLD AUTO: 3.66 10*3/MM3 (ref 0.7–3.1)
LYMPHOCYTES NFR BLD AUTO: 35.7 % (ref 19.6–45.3)
MCH RBC QN AUTO: 34.6 PG (ref 26.6–33)
MCHC RBC AUTO-ENTMCNC: 33.7 G/DL (ref 31.5–35.7)
MCV RBC AUTO: 102.5 FL (ref 79–97)
MONOCYTES # BLD AUTO: 0.8 10*3/MM3 (ref 0.1–0.9)
MONOCYTES NFR BLD AUTO: 7.8 % (ref 5–12)
NEUTROPHILS NFR BLD AUTO: 5.41 10*3/MM3 (ref 1.7–7)
NEUTROPHILS NFR BLD AUTO: 52.9 % (ref 42.7–76)
PHOSPHATE SERPL-MCNC: 4 MG/DL (ref 2.5–4.5)
PLATELET # BLD AUTO: 242 10*3/MM3 (ref 140–450)
PMV BLD AUTO: 9.4 FL (ref 6–12)
POTASSIUM SERPL-SCNC: 4.3 MMOL/L (ref 3.4–5)
PROT UR-MCNC: 8 MG/DL
PROT/CREAT UR: 53.6 MG/G CREA (ref 0–200)
RBC # BLD AUTO: 3.56 10*6/MM3 (ref 4.14–5.8)
SODIUM SERPL-SCNC: 137 MMOL/L (ref 137–145)
WBC # BLD AUTO: 10.24 10*3/MM3 (ref 3.4–10.8)

## 2021-03-09 PROCEDURE — 85025 COMPLETE CBC W/AUTO DIFF WBC: CPT | Performed by: INTERNAL MEDICINE

## 2021-03-09 PROCEDURE — 84156 ASSAY OF PROTEIN URINE: CPT | Performed by: INTERNAL MEDICINE

## 2021-03-09 PROCEDURE — 80069 RENAL FUNCTION PANEL: CPT | Performed by: INTERNAL MEDICINE

## 2021-03-09 PROCEDURE — 36415 COLL VENOUS BLD VENIPUNCTURE: CPT | Performed by: INTERNAL MEDICINE

## 2021-03-09 PROCEDURE — 82570 ASSAY OF URINE CREATININE: CPT | Performed by: INTERNAL MEDICINE

## 2021-03-09 PROCEDURE — 82306 VITAMIN D 25 HYDROXY: CPT | Performed by: INTERNAL MEDICINE

## 2021-03-24 DIAGNOSIS — N40.0 BENIGN PROSTATIC HYPERPLASIA WITHOUT LOWER URINARY TRACT SYMPTOMS: Chronic | ICD-10-CM

## 2021-03-25 RX ORDER — FINASTERIDE 5 MG/1
5 TABLET, FILM COATED ORAL DAILY
Qty: 90 TABLET | Refills: 3 | Status: SHIPPED | OUTPATIENT
Start: 2021-03-25 | End: 2022-01-03 | Stop reason: SDUPTHER

## 2021-06-21 DIAGNOSIS — K21.9 GASTROESOPHAGEAL REFLUX DISEASE WITHOUT ESOPHAGITIS: Chronic | ICD-10-CM

## 2021-06-21 RX ORDER — PANTOPRAZOLE SODIUM 40 MG/1
40 TABLET, DELAYED RELEASE ORAL DAILY
Qty: 90 TABLET | Refills: 3 | Status: SHIPPED | OUTPATIENT
Start: 2021-06-21 | End: 2022-05-23 | Stop reason: SDUPTHER

## 2021-06-22 ENCOUNTER — LAB (OUTPATIENT)
Dept: LAB | Facility: OTHER | Age: 76
End: 2021-06-22

## 2021-06-22 DIAGNOSIS — I10 ESSENTIAL HYPERTENSION: Chronic | ICD-10-CM

## 2021-06-22 DIAGNOSIS — D50.8 IRON DEFICIENCY ANEMIA DUE TO DIETARY CAUSES: ICD-10-CM

## 2021-06-22 DIAGNOSIS — E11.8 TYPE 2 DIABETES MELLITUS WITH COMPLICATION, WITHOUT LONG-TERM CURRENT USE OF INSULIN (HCC): Chronic | ICD-10-CM

## 2021-06-22 DIAGNOSIS — E78.2 MIXED HYPERLIPIDEMIA: Chronic | ICD-10-CM

## 2021-06-22 LAB
ALBUMIN SERPL-MCNC: 4.1 G/DL (ref 3.5–5)
ALBUMIN/GLOB SERPL: 1.2 G/DL (ref 1.1–1.8)
ALP SERPL-CCNC: 86 U/L (ref 38–126)
ALT SERPL W P-5'-P-CCNC: 31 U/L
ANION GAP SERPL CALCULATED.3IONS-SCNC: 8 MMOL/L (ref 5–15)
AST SERPL-CCNC: 55 U/L (ref 17–59)
BILIRUB SERPL-MCNC: 0.8 MG/DL (ref 0.2–1.3)
BUN SERPL-MCNC: 30 MG/DL (ref 7–23)
BUN/CREAT SERPL: 24.2 (ref 7–25)
CALCIUM SPEC-SCNC: 9.3 MG/DL (ref 8.4–10.2)
CHLORIDE SERPL-SCNC: 100 MMOL/L (ref 101–112)
CHOLEST SERPL-MCNC: 159 MG/DL (ref 150–200)
CO2 SERPL-SCNC: 32 MMOL/L (ref 22–30)
CREAT SERPL-MCNC: 1.24 MG/DL (ref 0.7–1.3)
DEPRECATED RDW RBC AUTO: 57.2 FL (ref 37–54)
EOSINOPHIL # BLD MANUAL: 0.3 10*3/MM3 (ref 0–0.4)
EOSINOPHIL NFR BLD MANUAL: 3 % (ref 0.3–6.2)
ERYTHROCYTE [DISTWIDTH] IN BLOOD BY AUTOMATED COUNT: 14.8 % (ref 12.3–15.4)
GFR SERPL CREATININE-BSD FRML MDRD: 57 ML/MIN/1.73 (ref 42–98)
GLOBULIN UR ELPH-MCNC: 3.4 GM/DL (ref 2.3–3.5)
GLUCOSE SERPL-MCNC: 139 MG/DL (ref 70–99)
HBA1C MFR BLD: 6.6 % (ref 4.8–5.6)
HCT VFR BLD AUTO: 38.2 % (ref 37.5–51)
HDLC SERPL-MCNC: 33 MG/DL (ref 40–59)
HGB BLD-MCNC: 12.9 G/DL (ref 13–17.7)
LDLC SERPL CALC-MCNC: 84 MG/DL
LDLC/HDLC SERPL: 2.28 {RATIO} (ref 0–3.55)
LYMPHOCYTES # BLD MANUAL: 3.96 10*3/MM3 (ref 0.7–3.1)
LYMPHOCYTES NFR BLD MANUAL: 40 % (ref 19.6–45.3)
LYMPHOCYTES NFR BLD MANUAL: 6 % (ref 5–12)
MACROCYTES BLD QL SMEAR: ABNORMAL
MCH RBC QN AUTO: 35.5 PG (ref 26.6–33)
MCHC RBC AUTO-ENTMCNC: 33.8 G/DL (ref 31.5–35.7)
MCV RBC AUTO: 105.2 FL (ref 79–97)
MONOCYTES # BLD AUTO: 0.59 10*3/MM3 (ref 0.1–0.9)
NEUTROPHILS # BLD AUTO: 5.04 10*3/MM3 (ref 1.7–7)
NEUTROPHILS NFR BLD MANUAL: 51 % (ref 42.7–76)
PLATELET # BLD AUTO: 243 10*3/MM3 (ref 140–450)
PMV BLD AUTO: 9.5 FL (ref 6–12)
POTASSIUM SERPL-SCNC: 4.4 MMOL/L (ref 3.4–5)
PROT SERPL-MCNC: 7.5 G/DL (ref 6.3–8.6)
RBC # BLD AUTO: 3.63 10*6/MM3 (ref 4.14–5.8)
SCAN SLIDE: NORMAL
SMALL PLATELETS BLD QL SMEAR: ADEQUATE
SODIUM SERPL-SCNC: 140 MMOL/L (ref 137–145)
T4 FREE SERPL-MCNC: 1.32 NG/DL (ref 0.93–1.7)
TRIGL SERPL-MCNC: 253 MG/DL
TSH SERPL DL<=0.05 MIU/L-ACNC: 2.05 UIU/ML (ref 0.27–4.2)
VLDLC SERPL-MCNC: 42 MG/DL (ref 5–40)
WBC # BLD AUTO: 9.89 10*3/MM3 (ref 3.4–10.8)
WBC MORPH BLD: NORMAL

## 2021-06-22 PROCEDURE — 84439 ASSAY OF FREE THYROXINE: CPT | Performed by: INTERNAL MEDICINE

## 2021-06-22 PROCEDURE — 83036 HEMOGLOBIN GLYCOSYLATED A1C: CPT | Performed by: INTERNAL MEDICINE

## 2021-06-22 PROCEDURE — 85025 COMPLETE CBC W/AUTO DIFF WBC: CPT | Performed by: INTERNAL MEDICINE

## 2021-06-22 PROCEDURE — 80061 LIPID PANEL: CPT | Performed by: INTERNAL MEDICINE

## 2021-06-22 PROCEDURE — 36415 COLL VENOUS BLD VENIPUNCTURE: CPT | Performed by: INTERNAL MEDICINE

## 2021-06-22 PROCEDURE — 84443 ASSAY THYROID STIM HORMONE: CPT | Performed by: INTERNAL MEDICINE

## 2021-06-22 PROCEDURE — 80053 COMPREHEN METABOLIC PANEL: CPT | Performed by: INTERNAL MEDICINE

## 2021-06-28 ENCOUNTER — OFFICE VISIT (OUTPATIENT)
Dept: FAMILY MEDICINE CLINIC | Facility: CLINIC | Age: 76
End: 2021-06-28

## 2021-06-28 VITALS
HEIGHT: 68 IN | HEART RATE: 73 BPM | DIASTOLIC BLOOD PRESSURE: 68 MMHG | WEIGHT: 238 LBS | BODY MASS INDEX: 36.07 KG/M2 | SYSTOLIC BLOOD PRESSURE: 132 MMHG

## 2021-06-28 DIAGNOSIS — K21.9 GASTROESOPHAGEAL REFLUX DISEASE WITHOUT ESOPHAGITIS: Chronic | ICD-10-CM

## 2021-06-28 DIAGNOSIS — I10 ESSENTIAL HYPERTENSION: Chronic | ICD-10-CM

## 2021-06-28 DIAGNOSIS — D50.8 IRON DEFICIENCY ANEMIA DUE TO DIETARY CAUSES: Chronic | ICD-10-CM

## 2021-06-28 DIAGNOSIS — N40.0 BENIGN PROSTATIC HYPERPLASIA WITHOUT LOWER URINARY TRACT SYMPTOMS: Chronic | ICD-10-CM

## 2021-06-28 DIAGNOSIS — N18.30 CKD (CHRONIC KIDNEY DISEASE) STAGE 3, GFR 30-59 ML/MIN (HCC): Chronic | ICD-10-CM

## 2021-06-28 DIAGNOSIS — E78.2 MIXED HYPERLIPIDEMIA: Chronic | ICD-10-CM

## 2021-06-28 DIAGNOSIS — R60.9 PERIPHERAL EDEMA: Chronic | ICD-10-CM

## 2021-06-28 DIAGNOSIS — I50.32 CHRONIC HEART FAILURE WITH PRESERVED EJECTION FRACTION (HCC): Chronic | ICD-10-CM

## 2021-06-28 DIAGNOSIS — Z00.00 MEDICARE ANNUAL WELLNESS VISIT, SUBSEQUENT: Primary | ICD-10-CM

## 2021-06-28 DIAGNOSIS — E66.01 MORBIDLY OBESE (HCC): ICD-10-CM

## 2021-06-28 DIAGNOSIS — N18.31 CHRONIC RENAL IMPAIRMENT, STAGE 3A (HCC): Chronic | ICD-10-CM

## 2021-06-28 DIAGNOSIS — M1A.0710 IDIOPATHIC CHRONIC GOUT OF RIGHT FOOT WITHOUT TOPHUS: Chronic | ICD-10-CM

## 2021-06-28 DIAGNOSIS — R97.20 ELEVATED PSA: Chronic | ICD-10-CM

## 2021-06-28 DIAGNOSIS — E11.8 TYPE 2 DIABETES MELLITUS WITH COMPLICATION, WITHOUT LONG-TERM CURRENT USE OF INSULIN (HCC): Chronic | ICD-10-CM

## 2021-06-28 DIAGNOSIS — R13.10 DYSPHAGIA, UNSPECIFIED TYPE: ICD-10-CM

## 2021-06-28 PROCEDURE — 1125F AMNT PAIN NOTED PAIN PRSNT: CPT | Performed by: INTERNAL MEDICINE

## 2021-06-28 PROCEDURE — 96160 PT-FOCUSED HLTH RISK ASSMT: CPT | Performed by: INTERNAL MEDICINE

## 2021-06-28 PROCEDURE — G0439 PPPS, SUBSEQ VISIT: HCPCS | Performed by: INTERNAL MEDICINE

## 2021-06-28 PROCEDURE — 99214 OFFICE O/P EST MOD 30 MIN: CPT | Performed by: INTERNAL MEDICINE

## 2021-06-28 RX ORDER — ALLOPURINOL 300 MG/1
300 TABLET ORAL DAILY
Qty: 90 TABLET | Refills: 3 | Status: SHIPPED | OUTPATIENT
Start: 2021-06-28 | End: 2022-05-02

## 2021-06-28 RX ORDER — PRAVASTATIN SODIUM 20 MG
20 TABLET ORAL EVERY EVENING
Qty: 90 TABLET | Refills: 3 | Status: SHIPPED | OUTPATIENT
Start: 2021-06-28 | End: 2022-05-23

## 2021-06-28 RX ORDER — METOPROLOL SUCCINATE 50 MG/1
50 TABLET, EXTENDED RELEASE ORAL 2 TIMES DAILY
Qty: 180 TABLET | Refills: 3 | Status: SHIPPED | OUTPATIENT
Start: 2021-06-28 | End: 2022-03-25

## 2021-06-28 RX ORDER — SPIRONOLACTONE 25 MG/1
25 TABLET ORAL DAILY
Qty: 90 TABLET | Refills: 3 | Status: SHIPPED | OUTPATIENT
Start: 2021-06-28 | End: 2022-05-23 | Stop reason: SDUPTHER

## 2021-06-28 RX ORDER — LOSARTAN POTASSIUM 100 MG/1
100 TABLET ORAL DAILY
Qty: 90 TABLET | Refills: 3 | Status: SHIPPED | OUTPATIENT
Start: 2021-06-28 | End: 2022-03-25

## 2021-06-28 NOTE — PROGRESS NOTES
Chief Complaint   Patient presents with   • Medicare Wellness-subsequent   • Annual Exam     Subjective   Noe Barnes Sr. is a 75 y.o. male who presents to the office for follow-up and review of labs.  He has diabetes which is managed with diet.  This was diagnosed at his previous visit.  He has been compliant with a diabetic diet.  He has hypertension and his blood pressure has been doing well.  He has chronic heart failure with a preserved ejection fraction.  His symptoms have been baseline.  He follows with cardiology. He takes Lasix as needed for peripheral edema.  He has GERD which is well controlled with Protonix 40 mg daily.  He has hyperlipidemia and takes pravastatin 20 mg daily.  He has iron deficient anemia and takes a daily iron supplement.  He has chronic renal insufficiency, stage 3a, and follows with nephrology.  His baseline creatinine runs around 1.4.  He has BPH and takes finasteride.  He has a diagnosis of an elevated PSA.  He has been seen by urology, and recent PSA levels have been normal.  He has a diagnosis of gout and takes allopurinol for prophylaxis.  He denies any recent gout flareups.    He has been having difficulty swallowing.  He often feels like his pills or food particles get stuck in his throat.    History of Present Illness has been reviewed and validated on 06/28/2021 and updated with any changes.    The following portions of the patient's history were reviewed and updated as appropriate: allergies, current medications, past family history, past medical history, past social history, past surgical history and problem list.    Review of Systems   Constitutional: Negative for chills, fatigue and fever.   HENT: Negative for congestion, sneezing, sore throat and trouble swallowing.    Eyes: Negative for visual disturbance.   Respiratory: Negative for cough, chest tightness, shortness of breath and wheezing.    Cardiovascular: Negative for chest pain and palpitations.  "  Gastrointestinal: Negative for abdominal pain, constipation, diarrhea, nausea and vomiting.   Genitourinary: Negative for dysuria, frequency and urgency.   Musculoskeletal: Negative for neck pain.   Neurological: Negative for dizziness, weakness and headaches.   Psychiatric/Behavioral:        Patient denies any feelings of depression and has not felt down, hopeless or lost interest in any activities.   All other systems reviewed and are negative.  Review of systems has been reviewed and validated on 06/28/2021 and updated with any changes.      Objective   Vitals:    06/28/21 1052   BP: 132/68   BP Location: Left arm   Patient Position: Sitting   Cuff Size: Adult   Pulse: 73   Weight: 108 kg (238 lb)   Height: 172.7 cm (68\")   PainSc: 0-No pain      Body mass index is 36.19 kg/m².    Physical Exam   Constitutional: He is oriented to person, place, and time. He appears well-developed. No distress.   HENT:   Head: Normocephalic and atraumatic.   Nose: Nose normal.   Eyes: Pupils are equal, round, and reactive to light. Conjunctivae are normal. No scleral icterus.   Cardiovascular: Normal rate, regular rhythm and normal heart sounds. Exam reveals no gallop and no friction rub.   No murmur heard.  Pulmonary/Chest: Effort normal and breath sounds normal. No respiratory distress. He has no wheezes. He has no rales.   Musculoskeletal: Normal range of motion.   Lymphadenopathy:     He has no cervical adenopathy.   Neurological: He is alert and oriented to person, place, and time. No cranial nerve deficit.   Skin: Skin is warm and dry. No rash noted.   Psychiatric: His behavior is normal. Judgment and thought content normal.   Nursing note and vitals reviewed.  Physical exam has been reviewed and validated on 06/28/2021 and updated with any changes.      Assessment/Plan   Diagnoses and all orders for this visit:    1. Medicare annual wellness visit, subsequent (Primary)    2. Type 2 diabetes mellitus with complication, " without long-term current use of insulin (CMS/HCC)  -     Hemoglobin A1c; Future    3. Essential hypertension  -     Comprehensive Metabolic Panel; Future  -     T4, Free; Future  -     TSH; Future  -     losartan (COZAAR) 100 MG tablet; Take 1 tablet by mouth Daily.  Dispense: 90 tablet; Refill: 3  -     metoprolol succinate XL (TOPROL-XL) 50 MG 24 hr tablet; Take 1 tablet by mouth 2 (Two) Times a Day.  Dispense: 180 tablet; Refill: 3    4. Mixed hyperlipidemia  -     LDL Cholesterol, Direct; Future  -     pravastatin (PRAVACHOL) 20 MG tablet; Take 1 tablet by mouth Every Evening.  Dispense: 90 tablet; Refill: 3    5. Chronic renal impairment, stage 3a (CMS/HCC)    6. Chronic heart failure with preserved ejection fraction (CMS/HCC)  -     spironolactone (ALDACTONE) 25 MG tablet; Take 1 tablet by mouth Daily.  Dispense: 90 tablet; Refill: 3    7. Gastroesophageal reflux disease without esophagitis    8. Benign prostatic hyperplasia without lower urinary tract symptoms    9. Iron deficiency anemia due to dietary causes  -     CBC & Differential; Future    10. Idiopathic chronic gout of right foot without tophus  -     allopurinol (ZYLOPRIM) 300 MG tablet; Take 1 tablet by mouth Daily.  Dispense: 90 tablet; Refill: 3    11. Peripheral edema    12. Elevated PSA  -     PSA DIAGNOSTIC; Future    13. CKD (chronic kidney disease) stage 3, GFR 30-59 ml/min (CMS/HCC)  -     magnesium oxide (MAGOX) 400 (241.3 Mg) MG tablet tablet; Take 1 tablet by mouth Daily.  Dispense: 90 tablet; Refill: 3    14. Dysphagia, unspecified type  -     Ambulatory Referral to General Surgery    15. Morbidly obese (CMS/HCC)         Labs are reviewed with patient.  His glucose is 139.  His hemoglobin A1c is 6.60.  This is down slightly from the previous level of 6.73.  He may continue with dietary management of the diabetes for now.  I discussed dietary measures to help with control of the blood sugar.  He may monitor blood sugar 1 time daily.  His  creatinine is elevated but baseline at 1.24.  He will continue to follow with nephrology.  His total cholesterol is 159, LDL 84 and triglycerides 253.  His HDL is 33.  He will continue with pravastatin for treatment of hyperlipidemia.  He will continue with Protonix 40 mg daily for treatment of GERD.  His blood pressure is controlled, and he will continue with his current blood pressure medication.  His anemia is stable with a hemoglobin of 12.9 and hematocrit of 38.2.  He will continue with the daily iron supplement for treatment of his anemia. He will continue with finasteride for treatment of BPH.  He will continue with Xarelto for anticoagulation secondary to prior history of DVT and pulmonary embolus.  He will continue with allopurinol for gout prophylaxis.    I will refer him for surgical consult due to the dysphagia.  He will need an EGD to evaluate his symptoms.  He is requesting to see Dr. Salmon in [place].    Patient's Body mass index is 36.19 kg/m². indicating that he is morbidly obese (BMI > 40 or > 35 with obesity - related health condition). Obesity-related health conditions include the following: hypertension, coronary heart disease, diabetes mellitus, dyslipidemias and GERD. Obesity is unchanged. BMI is is above average; BMI management plan is completed. We discussed portion control and increasing exercise..    PHQ-2/PHQ-9 Depression Screening 6/28/2021   Little interest or pleasure in doing things 0   Feeling down, depressed, or hopeless 0   Trouble falling or staying asleep, or sleeping too much -   Feeling tired or having little energy -   Poor appetite or overeating -   Feeling bad about yourself - or that you are a failure or have let yourself or your family down -   Trouble concentrating on things, such as reading the newspaper or watching television -   Moving or speaking so slowly that other people could have noticed. Or the opposite - being so fidgety or restless that you have been  moving around a lot more than usual -   Thoughts that you would be better off dead, or of hurting yourself in some way -   Total Score 0   If you checked off any problems, how difficult have these problems made it for you to do your work, take care of things at home, or get along with other people? -         Lab on 06/22/2021   Component Date Value Ref Range Status   • Glucose 06/22/2021 139* 70 - 99 mg/dL Final   • BUN 06/22/2021 30* 7 - 23 mg/dL Final   • Creatinine 06/22/2021 1.24  0.70 - 1.30 mg/dL Final   • Sodium 06/22/2021 140  137 - 145 mmol/L Final   • Potassium 06/22/2021 4.4  3.4 - 5.0 mmol/L Final   • Chloride 06/22/2021 100* 101 - 112 mmol/L Final   • CO2 06/22/2021 32.0* 22.0 - 30.0 mmol/L Final   • Calcium 06/22/2021 9.3  8.4 - 10.2 mg/dL Final   • Total Protein 06/22/2021 7.5  6.3 - 8.6 g/dL Final   • Albumin 06/22/2021 4.10  3.50 - 5.00 g/dL Final   • ALT (SGPT) 06/22/2021 31  <=50 U/L Final   • AST (SGOT) 06/22/2021 55  17 - 59 U/L Final   • Alkaline Phosphatase 06/22/2021 86  38 - 126 U/L Final   • Total Bilirubin 06/22/2021 0.8  0.2 - 1.3 mg/dL Final   • eGFR Non  Amer 06/22/2021 57  42 - 98 mL/min/1.73 Final   • Globulin 06/22/2021 3.4  2.3 - 3.5 gm/dL Final   • A/G Ratio 06/22/2021 1.2  1.1 - 1.8 g/dL Final   • BUN/Creatinine Ratio 06/22/2021 24.2  7.0 - 25.0 Final   • Anion Gap 06/22/2021 8.0  5.0 - 15.0 mmol/L Final   • Free T4 06/22/2021 1.32  0.93 - 1.70 ng/dL Final   • TSH 06/22/2021 2.050  0.270 - 4.200 uIU/mL Final   • Hemoglobin A1C 06/22/2021 6.60* 4.80 - 5.60 % Final   • Total Cholesterol 06/22/2021 159  150 - 200 mg/dL Final   • Triglycerides 06/22/2021 253* <=150 mg/dL Final   • HDL Cholesterol 06/22/2021 33* 40 - 59 mg/dL Final   • LDL Cholesterol  06/22/2021 84  <=100 mg/dL Final   • VLDL Cholesterol 06/22/2021 42* 5 - 40 mg/dL Final   • LDL/HDL Ratio 06/22/2021 2.28  0.00 - 3.55 Final   • WBC 06/22/2021 9.89  3.40 - 10.80 10*3/mm3 Final   • RBC 06/22/2021 3.63* 4.14 - 5.80  10*6/mm3 Final   • Hemoglobin 06/22/2021 12.9* 13.0 - 17.7 g/dL Final   • Hematocrit 06/22/2021 38.2  37.5 - 51.0 % Final   • MCV 06/22/2021 105.2* 79.0 - 97.0 fL Final   • MCH 06/22/2021 35.5* 26.6 - 33.0 pg Final   • MCHC 06/22/2021 33.8  31.5 - 35.7 g/dL Final   • RDW 06/22/2021 14.8  12.3 - 15.4 % Final   • RDW-SD 06/22/2021 57.2* 37.0 - 54.0 fl Final   • MPV 06/22/2021 9.5  6.0 - 12.0 fL Final   • Platelets 06/22/2021 243  140 - 450 10*3/mm3 Final   • Scan Slide 06/22/2021    Final    See Manual Differential Results   • Neutrophil % 06/22/2021 51.0  42.7 - 76.0 % Final   • Lymphocyte % 06/22/2021 40.0  19.6 - 45.3 % Final   • Monocyte % 06/22/2021 6.0  5.0 - 12.0 % Final   • Eosinophil % 06/22/2021 3.0  0.3 - 6.2 % Final   • Neutrophils Absolute 06/22/2021 5.04  1.70 - 7.00 10*3/mm3 Final   • Lymphocytes Absolute 06/22/2021 3.96* 0.70 - 3.10 10*3/mm3 Final   • Monocytes Absolute 06/22/2021 0.59  0.10 - 0.90 10*3/mm3 Final   • Eosinophils Absolute 06/22/2021 0.30  0.00 - 0.40 10*3/mm3 Final   • Macrocytes 06/22/2021 Slight/1+  None Seen Final   • WBC Morphology 06/22/2021 Normal  Normal Final   • Platelet Estimate 06/22/2021 Adequate  Normal Final   ]        .  .

## 2021-06-28 NOTE — PROGRESS NOTES
The ABCs of the Annual Wellness Visit  Subsequent Medicare Wellness Visit    Chief Complaint   Patient presents with   • Medicare Wellness-subsequent   • Annual Exam       Subjective   History of Present Illness:  Noe Barnes Sr. is a 75 y.o. male who presents for a subsequent Medicare Wellness Visit.    HEALTH RISK ASSESSMENT    Recent Hospitalizations:  No hospitalization(s) within the last year.    Current Medical Providers:  Patient Care Team:  Elia Herron MD as PCP - General (Family Medicine)  Ben Recinos MD as Consulting Physician (Nephrology)  Ger Thomas MD as Surgeon (General Surgery)  Gabrielle Cao APRN (Dermatology)    Smoking Status:  Social History     Tobacco Use   Smoking Status Former Smoker   • Packs/day: 1.50   • Years: 60.00   • Pack years: 90.00   • Quit date: 2015   • Years since quittin.1   Smokeless Tobacco Never Used       Alcohol Consumption:  Social History     Substance and Sexual Activity   Alcohol Use No       Depression Screen:   PHQ-2/PHQ-9 Depression Screening 2021   Little interest or pleasure in doing things 0   Feeling down, depressed, or hopeless 0   Trouble falling or staying asleep, or sleeping too much -   Feeling tired or having little energy -   Poor appetite or overeating -   Feeling bad about yourself - or that you are a failure or have let yourself or your family down -   Trouble concentrating on things, such as reading the newspaper or watching television -   Moving or speaking so slowly that other people could have noticed. Or the opposite - being so fidgety or restless that you have been moving around a lot more than usual -   Thoughts that you would be better off dead, or of hurting yourself in some way -   Total Score 0   If you checked off any problems, how difficult have these problems made it for you to do your work, take care of things at home, or get along with other people? -       Fall Risk Screen:  STEADI Fall Risk  Assessment was completed, and patient is at HIGH risk for falls. Assessment completed on:6/28/2021    Health Habits and Functional and Cognitive Screening:  Functional & Cognitive Status 6/28/2021   Do you have difficulty preparing food and eating? No   Do you have difficulty bathing yourself, getting dressed or grooming yourself? No   Do you have difficulty using the toilet? No   Do you have difficulty moving around from place to place? No   Do you have trouble with steps or getting out of a bed or a chair? No   Current Diet Well Balanced Diet   Dental Exam Not up to date   Eye Exam Not up to date   Exercise (times per week) 4 times per week   Current Exercises Include Yard Work   Current Exercise Activities Include -   Do you need help using the phone?  No   Are you deaf or do you have serious difficulty hearing?  No   Do you need help with transportation? No   Do you need help shopping? No   Do you need help preparing meals?  No   Do you need help with housework?  No   Do you need help with laundry? No   Do you need help taking your medications? No   Do you need help managing money? No   Do you ever drive or ride in a car without wearing a seat belt? No   Have you felt unusual stress, anger or loneliness in the last month? No   Who do you live with? Spouse   If you need help, do you have trouble finding someone available to you? -   Have you been bothered in the last four weeks by sexual problems? No   Do you have difficulty concentrating, remembering or making decisions? No         Does the patient have evidence of cognitive impairment? No    Asprin use counseling:Takes Xarelto    Age-appropriate Screening Schedule:  Refer to the list below for future screening recommendations based on patient's age, sex and/or medical conditions. Orders for these recommended tests are listed in the plan section. The patient has been provided with a written plan.    Health Maintenance   Topic Date Due   • TDAP/TD VACCINES (1 -  Tdap) Never done   • ZOSTER VACCINE (1 of 2) Never done   • DIABETIC FOOT EXAM  Never done   • DIABETIC EYE EXAM  Never done   • INFLUENZA VACCINE  08/01/2021   • PROSTATE CANCER SCREENING  12/21/2021   • HEMOGLOBIN A1C  12/22/2021   • URINE MICROALBUMIN  03/09/2022   • LIPID PANEL  06/22/2022          The following portions of the patient's history were reviewed and updated as appropriate: allergies, current medications, past family history, past medical history, past social history, past surgical history and problem list.    Outpatient Medications Prior to Visit   Medication Sig Dispense Refill   • Cyanocobalamin (B-12) 1000 MCG capsule Take 1 capsule by mouth Daily. 30 capsule 0   • ferrous sulfate 325 (65 FE) MG tablet Take 325 mg by mouth Daily With Breakfast.     • finasteride (PROSCAR) 5 MG tablet Take 1 tablet by mouth Daily. 90 tablet 3   • fluticasone (FLONASE) 50 MCG/ACT nasal spray 2 sprays into the nostril(s) as directed by provider Daily As Needed for Rhinitis or Allergies.     • furosemide (LASIX) 20 MG tablet Take 1 tablet by mouth Daily. (Patient taking differently: Take 20 mg by mouth 2 (Two) Times a Day.) 90 tablet 1   • hydrALAZINE (APRESOLINE) 50 MG tablet TAKE 1 TABLET BY MOUTH TWO TIMES A  tablet 2   • icosapent ethyl (Vascepa) 1 g capsule capsule Take 2 capsules by mouth 2 (two) times a day.     • isosorbide mononitrate (IMDUR) 60 MG 24 hr tablet Take 60 mg by mouth Daily.  0   • nystatin (MYCOSTATIN) 584785 UNIT/GM ointment Apply  topically 3 (Three) Times a Day. 30 g 5   • pantoprazole (PROTONIX) 40 MG EC tablet TAKE 1 TABLET BY MOUTH DAILY 90 tablet 3   • rivaroxaban (XARELTO) 10 MG tablet Take 10 mg by mouth Daily.     • tolnaftate (TINACTIN) 1 % cream Apply 1 application topically 2 (Two) Times a Day.     • triamcinolone (KENALOG) 0.1 % cream Apply  topically to the appropriate area as directed 2 (Two) Times a Day. 80 g 2   • allopurinol (ZYLOPRIM) 300 MG tablet Take 1 tablet by  mouth Daily. 90 tablet 3   • losartan (COZAAR) 100 MG tablet Take 1 tablet by mouth Daily. 90 tablet 3   • magnesium oxide (MAGOX) 400 (241.3 Mg) MG tablet tablet Take 1 tablet by mouth Daily. 90 tablet 3   • metoprolol succinate XL (TOPROL-XL) 50 MG 24 hr tablet Take 1 tablet by mouth 2 (Two) Times a Day. 180 tablet 3   • pravastatin (PRAVACHOL) 20 MG tablet Take 1 tablet by mouth Every Evening. 90 tablet 3   • spironolactone (ALDACTONE) 25 MG tablet TAKE 1 TABLET BY MOUTH DAILY       No facility-administered medications prior to visit.       Patient Active Problem List   Diagnosis   • Mixed hyperlipidemia   • Iron deficiency anemia due to dietary causes   • Gastroesophageal reflux disease without esophagitis   • Essential hypertension   • Elevated PSA   • Colon polyps   • Idiopathic chronic gout of right foot without tophus   • History of adenomatous polyp of colon   • Yeast infection of the skin   • Peripheral edema   • Chronic renal impairment, stage 3a (CMS/HCC)   • Benign prostatic hyperplasia without lower urinary tract symptoms   • (HFpEF) heart failure with preserved ejection fraction (CMS/HCC)   • Morbidly obese (CMS/HCC)   • Palpitations   • Type 2 diabetes mellitus with complication, without long-term current use of insulin (CMS/HCC)       Advanced Care Planning:  ACP discussion was held with the patient during this visit. Patient does not have an advance directive, declines further assistance.    Review of Systems    Compared to one year ago, the patient feels his physical health is the same.  Compared to one year ago, the patient feels his mental health is the same.    Reviewed chart for potential of high risk medication in the elderly: yes  Reviewed chart for potential of harmful drug interactions in the elderly:yes    Objective         Vitals:    06/28/21 1052   BP: 132/68   BP Location: Left arm   Patient Position: Sitting   Cuff Size: Adult   Pulse: 73   Weight: 108 kg (238 lb)   Height: 172.7 cm  "(68\")   PainSc: 0-No pain       Body mass index is 36.19 kg/m².  Discussed the patient's BMI with him. The BMI is above average; BMI management plan is completed.    Physical Exam    Lab Results   Component Value Date    TRIG 253 (H) 06/22/2021    HDL 33 (L) 06/22/2021    LDL 84 06/22/2021    VLDL 42 (H) 06/22/2021    HGBA1C 6.60 (H) 06/22/2021        Assessment/Plan   Medicare Risks and Personalized Health Plan  CMS Preventative Services Quick Reference  Cardiovascular risk  Diabetic Lab Screening   Immunizations Discussed/Encouraged (specific immunizations; adacel Tdap and Shingrix )  Obesity/Overweight   Prostate Cancer Screening   Recommend annual influenza vaccine.  Recommend COVID-19 vaccine.  The above risks/problems have been discussed with the patient.  Pertinent information has been shared with the patient in the After Visit Summary.  Follow up plans and orders are seen below in the Assessment/Plan Section.    Diagnoses and all orders for this visit:    1. Medicare annual wellness visit, subsequent (Primary)    2. Type 2 diabetes mellitus with complication, without long-term current use of insulin (CMS/[facility])  -     Hemoglobin A1c; Future    3. Essential hypertension  -     Comprehensive Metabolic Panel; Future  -     T4, Free; Future  -     TSH; Future  -     losartan (COZAAR) 100 MG tablet; Take 1 tablet by mouth Daily.  Dispense: 90 tablet; Refill: 3  -     metoprolol succinate XL (TOPROL-XL) 50 MG 24 hr tablet; Take 1 tablet by mouth 2 (Two) Times a Day.  Dispense: 180 tablet; Refill: 3    4. Mixed hyperlipidemia  -     LDL Cholesterol, Direct; Future  -     pravastatin (PRAVACHOL) 20 MG tablet; Take 1 tablet by mouth Every Evening.  Dispense: 90 tablet; Refill: 3    5. Chronic renal impairment, stage 3a (CMS/[facility])    6. Chronic heart failure with preserved ejection fraction (CMS/[facility])  -     spironolactone (ALDACTONE) 25 MG tablet; Take 1 tablet by mouth Daily.  Dispense: 90 tablet; Refill: 3    7. " Gastroesophageal reflux disease without esophagitis    8. Benign prostatic hyperplasia without lower urinary tract symptoms    9. Iron deficiency anemia due to dietary causes  -     CBC & Differential; Future    10. Idiopathic chronic gout of right foot without tophus  -     allopurinol (ZYLOPRIM) 300 MG tablet; Take 1 tablet by mouth Daily.  Dispense: 90 tablet; Refill: 3    11. Peripheral edema    12. Elevated PSA  -     PSA DIAGNOSTIC; Future    13. CKD (chronic kidney disease) stage 3, GFR 30-59 ml/min (CMS/[facility])  -     magnesium oxide (MAGOX) 400 (241.3 Mg) MG tablet tablet; Take 1 tablet by mouth Daily.  Dispense: 90 tablet; Refill: 3    14. Dysphagia, unspecified type  -     Ambulatory Referral to General Surgery    15. Morbidly obese (CMS/[facility])      Follow Up:  Return in about 6 months (around 12/28/2021) for Next scheduled follow up, Or sooner as needed With Labs prior to appointment.     An After Visit Summary and PPPS were given to the patient.

## 2021-10-04 DIAGNOSIS — I10 ESSENTIAL HYPERTENSION: Chronic | ICD-10-CM

## 2021-10-04 RX ORDER — HYDRALAZINE HYDROCHLORIDE 50 MG/1
TABLET, FILM COATED ORAL
Qty: 180 TABLET | Refills: 2 | Status: SHIPPED | OUTPATIENT
Start: 2021-10-04 | End: 2022-08-08

## 2021-12-27 ENCOUNTER — LAB (OUTPATIENT)
Dept: LAB | Facility: OTHER | Age: 76
End: 2021-12-27

## 2021-12-27 DIAGNOSIS — D50.8 IRON DEFICIENCY ANEMIA DUE TO DIETARY CAUSES: ICD-10-CM

## 2021-12-27 DIAGNOSIS — E11.8 TYPE 2 DIABETES MELLITUS WITH COMPLICATION, WITHOUT LONG-TERM CURRENT USE OF INSULIN (HCC): Chronic | ICD-10-CM

## 2021-12-27 DIAGNOSIS — I10 ESSENTIAL HYPERTENSION: Chronic | ICD-10-CM

## 2021-12-27 DIAGNOSIS — R97.20 ELEVATED PSA: Chronic | ICD-10-CM

## 2021-12-27 DIAGNOSIS — E78.2 MIXED HYPERLIPIDEMIA: Chronic | ICD-10-CM

## 2021-12-27 LAB
ALBUMIN SERPL-MCNC: 3.7 G/DL (ref 3.5–5)
ALBUMIN/GLOB SERPL: 1.1 G/DL (ref 1.1–1.8)
ALP SERPL-CCNC: 83 U/L (ref 38–126)
ALT SERPL W P-5'-P-CCNC: 35 U/L
ANION GAP SERPL CALCULATED.3IONS-SCNC: 6 MMOL/L (ref 5–15)
ARTICHOKE IGE QN: 79 MG/DL (ref 0–100)
AST SERPL-CCNC: 40 U/L (ref 17–59)
BASOPHILS # BLD MANUAL: 0.09 10*3/MM3 (ref 0–0.2)
BASOPHILS NFR BLD MANUAL: 1 % (ref 0–1.5)
BILIRUB SERPL-MCNC: 0.7 MG/DL (ref 0.2–1.3)
BUN SERPL-MCNC: 37 MG/DL (ref 7–23)
BUN/CREAT SERPL: 29.6 (ref 7–25)
CALCIUM SPEC-SCNC: 8.8 MG/DL (ref 8.4–10.2)
CHLORIDE SERPL-SCNC: 99 MMOL/L (ref 101–112)
CO2 SERPL-SCNC: 33 MMOL/L (ref 22–30)
CREAT SERPL-MCNC: 1.25 MG/DL (ref 0.7–1.3)
DEPRECATED RDW RBC AUTO: 54.4 FL (ref 37–54)
EOSINOPHIL # BLD MANUAL: 0.27 10*3/MM3 (ref 0–0.4)
EOSINOPHIL NFR BLD MANUAL: 3 % (ref 0.3–6.2)
ERYTHROCYTE [DISTWIDTH] IN BLOOD BY AUTOMATED COUNT: 14.5 % (ref 12.3–15.4)
GFR SERPL CREATININE-BSD FRML MDRD: 56 ML/MIN/1.73 (ref 42–98)
GLOBULIN UR ELPH-MCNC: 3.3 GM/DL (ref 2.3–3.5)
GLUCOSE SERPL-MCNC: 126 MG/DL (ref 70–99)
HBA1C MFR BLD: 6.98 % (ref 4.8–5.6)
HCT VFR BLD AUTO: 36.6 % (ref 37.5–51)
HGB BLD-MCNC: 12.2 G/DL (ref 13–17.7)
LYMPHOCYTES # BLD MANUAL: 3.54 10*3/MM3 (ref 0.7–3.1)
LYMPHOCYTES NFR BLD MANUAL: 7 % (ref 5–12)
MACROCYTES BLD QL SMEAR: ABNORMAL
MCH RBC QN AUTO: 34.6 PG (ref 26.6–33)
MCHC RBC AUTO-ENTMCNC: 33.3 G/DL (ref 31.5–35.7)
MCV RBC AUTO: 103.7 FL (ref 79–97)
MONOCYTES # BLD: 0.62 10*3/MM3 (ref 0.1–0.9)
NEUTROPHILS # BLD AUTO: 4.33 10*3/MM3 (ref 1.7–7)
NEUTROPHILS NFR BLD MANUAL: 49 % (ref 42.7–76)
PLATELET # BLD AUTO: 215 10*3/MM3 (ref 140–450)
PMV BLD AUTO: 9.2 FL (ref 6–12)
POTASSIUM SERPL-SCNC: 4.2 MMOL/L (ref 3.4–5)
PROT SERPL-MCNC: 7 G/DL (ref 6.3–8.6)
PSA SERPL-MCNC: 0.14 NG/ML (ref 0–4)
RBC # BLD AUTO: 3.53 10*6/MM3 (ref 4.14–5.8)
SCAN SLIDE: NORMAL
SMALL PLATELETS BLD QL SMEAR: ADEQUATE
SODIUM SERPL-SCNC: 138 MMOL/L (ref 137–145)
T4 FREE SERPL-MCNC: 1.26 NG/DL (ref 0.93–1.7)
TSH SERPL DL<=0.05 MIU/L-ACNC: 3.98 UIU/ML (ref 0.27–4.2)
VARIANT LYMPHS NFR BLD MANUAL: 40 % (ref 19.6–45.3)
WBC MORPH BLD: NORMAL
WBC NRBC COR # BLD: 8.84 10*3/MM3 (ref 3.4–10.8)

## 2021-12-27 PROCEDURE — 84439 ASSAY OF FREE THYROXINE: CPT | Performed by: INTERNAL MEDICINE

## 2021-12-27 PROCEDURE — 80053 COMPREHEN METABOLIC PANEL: CPT | Performed by: INTERNAL MEDICINE

## 2021-12-27 PROCEDURE — 83036 HEMOGLOBIN GLYCOSYLATED A1C: CPT | Performed by: INTERNAL MEDICINE

## 2021-12-27 PROCEDURE — 36415 COLL VENOUS BLD VENIPUNCTURE: CPT | Performed by: INTERNAL MEDICINE

## 2021-12-27 PROCEDURE — 84443 ASSAY THYROID STIM HORMONE: CPT | Performed by: INTERNAL MEDICINE

## 2021-12-27 PROCEDURE — 84153 ASSAY OF PSA TOTAL: CPT | Performed by: INTERNAL MEDICINE

## 2021-12-27 PROCEDURE — 83721 ASSAY OF BLOOD LIPOPROTEIN: CPT | Performed by: INTERNAL MEDICINE

## 2021-12-27 PROCEDURE — 85025 COMPLETE CBC W/AUTO DIFF WBC: CPT | Performed by: INTERNAL MEDICINE

## 2021-12-30 PROBLEM — K22.70 BARRETT'S ESOPHAGUS WITHOUT DYSPLASIA: Chronic | Status: ACTIVE | Noted: 2021-12-30

## 2022-01-03 ENCOUNTER — OFFICE VISIT (OUTPATIENT)
Dept: FAMILY MEDICINE CLINIC | Facility: CLINIC | Age: 77
End: 2022-01-03

## 2022-01-03 VITALS
OXYGEN SATURATION: 94 % | DIASTOLIC BLOOD PRESSURE: 70 MMHG | HEART RATE: 64 BPM | BODY MASS INDEX: 36.53 KG/M2 | TEMPERATURE: 97.2 F | HEIGHT: 68 IN | WEIGHT: 241 LBS | SYSTOLIC BLOOD PRESSURE: 116 MMHG

## 2022-01-03 DIAGNOSIS — N18.31 CHRONIC RENAL IMPAIRMENT, STAGE 3A: Chronic | ICD-10-CM

## 2022-01-03 DIAGNOSIS — K22.70 BARRETT'S ESOPHAGUS WITHOUT DYSPLASIA: Chronic | ICD-10-CM

## 2022-01-03 DIAGNOSIS — E78.2 MIXED HYPERLIPIDEMIA: Chronic | ICD-10-CM

## 2022-01-03 DIAGNOSIS — D50.8 IRON DEFICIENCY ANEMIA DUE TO DIETARY CAUSES: Chronic | ICD-10-CM

## 2022-01-03 DIAGNOSIS — E66.01 MORBIDLY OBESE: Chronic | ICD-10-CM

## 2022-01-03 DIAGNOSIS — N40.0 BENIGN PROSTATIC HYPERPLASIA WITHOUT LOWER URINARY TRACT SYMPTOMS: Chronic | ICD-10-CM

## 2022-01-03 DIAGNOSIS — Z23 INFLUENZA VACCINATION ADMINISTERED AT CURRENT VISIT: ICD-10-CM

## 2022-01-03 DIAGNOSIS — I50.32 CHRONIC HEART FAILURE WITH PRESERVED EJECTION FRACTION: Chronic | ICD-10-CM

## 2022-01-03 DIAGNOSIS — M1A.0710 IDIOPATHIC CHRONIC GOUT OF RIGHT FOOT WITHOUT TOPHUS: Chronic | ICD-10-CM

## 2022-01-03 DIAGNOSIS — R21 RASH: ICD-10-CM

## 2022-01-03 DIAGNOSIS — I10 ESSENTIAL HYPERTENSION: Chronic | ICD-10-CM

## 2022-01-03 DIAGNOSIS — K21.00 GASTROESOPHAGEAL REFLUX DISEASE WITH ESOPHAGITIS WITHOUT HEMORRHAGE: Chronic | ICD-10-CM

## 2022-01-03 DIAGNOSIS — E11.8 TYPE 2 DIABETES MELLITUS WITH COMPLICATION, WITHOUT LONG-TERM CURRENT USE OF INSULIN: Primary | Chronic | ICD-10-CM

## 2022-01-03 PROCEDURE — 90662 IIV NO PRSV INCREASED AG IM: CPT | Performed by: INTERNAL MEDICINE

## 2022-01-03 PROCEDURE — G0008 ADMIN INFLUENZA VIRUS VAC: HCPCS | Performed by: INTERNAL MEDICINE

## 2022-01-03 PROCEDURE — 99214 OFFICE O/P EST MOD 30 MIN: CPT | Performed by: INTERNAL MEDICINE

## 2022-01-03 RX ORDER — FUROSEMIDE 20 MG/1
1 TABLET ORAL 2 TIMES DAILY WITH MEALS
COMMUNITY
Start: 2021-10-13

## 2022-01-03 RX ORDER — VITAMIN B COMPLEX
2500 TABLET ORAL DAILY
COMMUNITY

## 2022-01-03 RX ORDER — CHLORAL HYDRATE 500 MG
1 CAPSULE ORAL DAILY
COMMUNITY

## 2022-01-03 RX ORDER — FINASTERIDE 5 MG/1
5 TABLET, FILM COATED ORAL DAILY
Qty: 90 TABLET | Refills: 3 | Status: SHIPPED | OUTPATIENT
Start: 2022-01-03

## 2022-01-03 RX ORDER — CLOBETASOL PROPIONATE 0.5 MG/G
1 CREAM TOPICAL 2 TIMES DAILY
Qty: 60 G | Refills: 0 | Status: SHIPPED | OUTPATIENT
Start: 2022-01-03

## 2022-03-25 DIAGNOSIS — I10 ESSENTIAL HYPERTENSION: Chronic | ICD-10-CM

## 2022-03-25 RX ORDER — LOSARTAN POTASSIUM 100 MG/1
100 TABLET ORAL DAILY
Qty: 90 TABLET | Refills: 3 | Status: SHIPPED | OUTPATIENT
Start: 2022-03-25

## 2022-03-25 RX ORDER — METOPROLOL SUCCINATE 50 MG/1
TABLET, EXTENDED RELEASE ORAL
Qty: 180 TABLET | Refills: 3 | Status: SHIPPED | OUTPATIENT
Start: 2022-03-25

## 2022-05-02 DIAGNOSIS — M1A.0710 IDIOPATHIC CHRONIC GOUT OF RIGHT FOOT WITHOUT TOPHUS: Chronic | ICD-10-CM

## 2022-05-02 RX ORDER — ALLOPURINOL 300 MG/1
300 TABLET ORAL DAILY
Qty: 30 TABLET | Refills: 0 | Status: SHIPPED | OUTPATIENT
Start: 2022-05-02 | End: 2022-08-29

## 2022-05-23 DIAGNOSIS — K21.9 GASTROESOPHAGEAL REFLUX DISEASE WITHOUT ESOPHAGITIS: Chronic | ICD-10-CM

## 2022-05-23 DIAGNOSIS — I50.32 CHRONIC HEART FAILURE WITH PRESERVED EJECTION FRACTION: Chronic | ICD-10-CM

## 2022-05-23 DIAGNOSIS — E78.2 MIXED HYPERLIPIDEMIA: Chronic | ICD-10-CM

## 2022-05-23 RX ORDER — SPIRONOLACTONE 25 MG/1
25 TABLET ORAL DAILY
Qty: 90 TABLET | Refills: 1 | Status: SHIPPED | OUTPATIENT
Start: 2022-05-23

## 2022-05-23 RX ORDER — PANTOPRAZOLE SODIUM 40 MG/1
40 TABLET, DELAYED RELEASE ORAL DAILY
Qty: 90 TABLET | Refills: 1 | Status: SHIPPED | OUTPATIENT
Start: 2022-05-23

## 2022-05-23 RX ORDER — PRAVASTATIN SODIUM 20 MG
20 TABLET ORAL EVERY EVENING
Qty: 90 TABLET | Refills: 1 | Status: SHIPPED | OUTPATIENT
Start: 2022-05-23

## 2022-06-29 ENCOUNTER — LAB (OUTPATIENT)
Dept: LAB | Facility: OTHER | Age: 77
End: 2022-06-29

## 2022-06-29 DIAGNOSIS — E11.8 TYPE 2 DIABETES MELLITUS WITH COMPLICATION, WITHOUT LONG-TERM CURRENT USE OF INSULIN: Chronic | ICD-10-CM

## 2022-06-29 DIAGNOSIS — I10 ESSENTIAL HYPERTENSION: Chronic | ICD-10-CM

## 2022-06-29 DIAGNOSIS — E78.2 MIXED HYPERLIPIDEMIA: Chronic | ICD-10-CM

## 2022-06-29 DIAGNOSIS — D50.8 IRON DEFICIENCY ANEMIA DUE TO DIETARY CAUSES: ICD-10-CM

## 2022-06-29 LAB
ALBUMIN SERPL-MCNC: 4 G/DL (ref 3.5–5)
ALBUMIN UR-MCNC: <1.2 MG/DL
ALBUMIN/GLOB SERPL: 1.3 G/DL (ref 1.1–1.8)
ALP SERPL-CCNC: 87 U/L (ref 38–126)
ALT SERPL W P-5'-P-CCNC: 35 U/L
ANION GAP SERPL CALCULATED.3IONS-SCNC: 7 MMOL/L (ref 5–15)
AST SERPL-CCNC: 45 U/L (ref 17–59)
BILIRUB SERPL-MCNC: 0.7 MG/DL (ref 0.2–1.3)
BUN SERPL-MCNC: 40 MG/DL (ref 7–23)
BUN/CREAT SERPL: 30.3 (ref 7–25)
CALCIUM SPEC-SCNC: 9.3 MG/DL (ref 8.4–10.2)
CHLORIDE SERPL-SCNC: 101 MMOL/L (ref 101–112)
CHOLEST SERPL-MCNC: 159 MG/DL (ref 150–200)
CO2 SERPL-SCNC: 34 MMOL/L (ref 22–30)
CREAT SERPL-MCNC: 1.32 MG/DL (ref 0.7–1.3)
CREAT UR-MCNC: 115.7 MG/DL
DEPRECATED RDW RBC AUTO: 57.1 FL (ref 37–54)
EGFRCR SERPLBLD CKD-EPI 2021: 56.6 ML/MIN/1.73
EOSINOPHIL # BLD MANUAL: 0.22 10*3/MM3 (ref 0–0.4)
EOSINOPHIL NFR BLD MANUAL: 2 % (ref 0.3–6.2)
ERYTHROCYTE [DISTWIDTH] IN BLOOD BY AUTOMATED COUNT: 15 % (ref 12.3–15.4)
GLOBULIN UR ELPH-MCNC: 3.2 GM/DL (ref 2.3–3.5)
GLUCOSE SERPL-MCNC: 129 MG/DL (ref 70–99)
HBA1C MFR BLD: 6.7 % (ref 4.8–5.6)
HCT VFR BLD AUTO: 38.2 % (ref 37.5–51)
HDLC SERPL-MCNC: 35 MG/DL (ref 40–59)
HGB BLD-MCNC: 12.6 G/DL (ref 13–17.7)
LDLC SERPL CALC-MCNC: 84 MG/DL
LDLC/HDLC SERPL: 2.19 {RATIO} (ref 0–3.55)
LYMPHOCYTES # BLD MANUAL: 3.79 10*3/MM3 (ref 0.7–3.1)
LYMPHOCYTES NFR BLD MANUAL: 7 % (ref 5–12)
MACROCYTES BLD QL SMEAR: ABNORMAL
MCH RBC QN AUTO: 34.4 PG (ref 26.6–33)
MCHC RBC AUTO-ENTMCNC: 33 G/DL (ref 31.5–35.7)
MCV RBC AUTO: 104.4 FL (ref 79–97)
MICROALBUMIN/CREAT UR: NORMAL MG/G{CREAT}
MONOCYTES # BLD: 0.78 10*3/MM3 (ref 0.1–0.9)
NEUTROPHILS # BLD AUTO: 6.36 10*3/MM3 (ref 1.7–7)
NEUTROPHILS NFR BLD MANUAL: 57 % (ref 42.7–76)
PLATELET # BLD AUTO: 219 10*3/MM3 (ref 140–450)
PMV BLD AUTO: 9.5 FL (ref 6–12)
POTASSIUM SERPL-SCNC: 4.6 MMOL/L (ref 3.4–5)
PROT SERPL-MCNC: 7.2 G/DL (ref 6.3–8.6)
RBC # BLD AUTO: 3.66 10*6/MM3 (ref 4.14–5.8)
SMALL PLATELETS BLD QL SMEAR: ADEQUATE
SODIUM SERPL-SCNC: 142 MMOL/L (ref 137–145)
T4 FREE SERPL-MCNC: 1.4 NG/DL (ref 0.93–1.7)
TRIGL SERPL-MCNC: 237 MG/DL
TSH SERPL DL<=0.05 MIU/L-ACNC: 3.18 UIU/ML (ref 0.27–4.2)
VARIANT LYMPHS NFR BLD MANUAL: 34 % (ref 19.6–45.3)
VLDLC SERPL-MCNC: 40 MG/DL (ref 5–40)
WBC MORPH BLD: NORMAL
WBC NRBC COR # BLD: 11.16 10*3/MM3 (ref 3.4–10.8)

## 2022-06-29 PROCEDURE — 85025 COMPLETE CBC W/AUTO DIFF WBC: CPT | Performed by: INTERNAL MEDICINE

## 2022-06-29 PROCEDURE — 84439 ASSAY OF FREE THYROXINE: CPT | Performed by: INTERNAL MEDICINE

## 2022-06-29 PROCEDURE — 80061 LIPID PANEL: CPT | Performed by: INTERNAL MEDICINE

## 2022-06-29 PROCEDURE — 80053 COMPREHEN METABOLIC PANEL: CPT | Performed by: INTERNAL MEDICINE

## 2022-06-29 PROCEDURE — 82043 UR ALBUMIN QUANTITATIVE: CPT | Performed by: INTERNAL MEDICINE

## 2022-06-29 PROCEDURE — 83036 HEMOGLOBIN GLYCOSYLATED A1C: CPT | Performed by: INTERNAL MEDICINE

## 2022-06-29 PROCEDURE — 36415 COLL VENOUS BLD VENIPUNCTURE: CPT | Performed by: INTERNAL MEDICINE

## 2022-06-29 PROCEDURE — 84443 ASSAY THYROID STIM HORMONE: CPT | Performed by: INTERNAL MEDICINE

## 2022-06-29 PROCEDURE — 82570 ASSAY OF URINE CREATININE: CPT | Performed by: INTERNAL MEDICINE

## 2022-07-13 NOTE — PROGRESS NOTES
Chief Complaint   Patient presents with   • Medicare Wellness-subsequent   • Hyperlipidemia   • Hypertension   • Type 2 diabetes mellitus with complication, without long-te   • Benign prostatic hyperplasia without lower urinary tract sy   • Iron deficiency anemia    • Gastroesophageal reflux disease with esophagitis without he   • Chronic renal impairment, stage 3a    • Idiopathic chronic gout      Subjective   Noe Barnes Sr. is a 76 y.o. male who presents to the office for follow-up and review of labs.  He has diabetes which is managed with diet. He has been compliant with a diabetic diet.  He has hypertension and his blood pressure has been doing well.  He has chronic heart failure with a preserved ejection fraction.  His symptoms have been baseline.  He follows with cardiology. He takes Lasix as needed for peripheral edema. He has hyperlipidemia and takes pravastatin 20 mg nightly.  He has iron deficient anemia and takes a daily iron supplement.  He also takes a daily B12 supplement.  He has chronic renal insufficiency, stage 3a, and follows with nephrology.  His baseline creatinine runs around 1.4.  He has BPH and takes finasteride 5 mg daily.  He has a diagnosis of an elevated PSA.  He has been seen by urology, and recent PSA levels have been normal.  He has been having increased urinary symptoms and is requesting a referral back to urology.  He has a diagnosis of gout and takes allopurinol 300 mg daily for prophylaxis.  He denies any recent gout flareups.     He has GERD and Araujo's esophagus without dysplasia.  Which is well controlled with Protonix 40 mg daily.  He has recently been having difficulty swallowing and is concerned about narrowing of his esophagus.  He is requesting referral back to Dr. Salmon to have this evaluated.    History of Present Illness has been reviewed and validated on 07/15/2022 and updated with any changes.    The following portions of the patient's history were reviewed  "and updated as appropriate: allergies, current medications, past family history, past medical history, past social history, past surgical history and problem list.    Review of Systems   Constitutional: Negative for chills, fatigue and fever.   HENT: Negative for congestion, sneezing, sore throat and trouble swallowing.    Eyes: Negative for visual disturbance.   Respiratory: Negative for cough, chest tightness, shortness of breath and wheezing.    Cardiovascular: Negative for chest pain and palpitations.   Gastrointestinal: Negative for abdominal pain, constipation, diarrhea, nausea and vomiting.   Genitourinary: Positive for urgency. Negative for dysuria and frequency.   Musculoskeletal: Negative for neck pain.   Neurological: Negative for dizziness, weakness and headaches.   Psychiatric/Behavioral:        Patient denies any feelings of depression and has not felt down, hopeless or lost interest in any activities.   All other systems reviewed and are negative.  Review of systems has been reviewed and validated on 07/15/2022 and updated with any changes.      Objective   Vitals:    07/15/22 0921   BP: 122/62   BP Location: Left arm   Patient Position: Sitting   Cuff Size: Large Adult   Pulse: 77  Comment: regular   Temp: 97.5 °F (36.4 °C)   TempSrc: Temporal   SpO2: 93%   Weight: 111 kg (245 lb)   Height: 172.7 cm (68\")   PainSc: 0-No pain      Body mass index is 37.25 kg/m².    Physical Exam   Constitutional: He is oriented to person, place, and time. He appears well-developed. No distress.   HENT:   Head: Normocephalic and atraumatic.   Nose: Nose normal.   Eyes: Pupils are equal, round, and reactive to light. Conjunctivae are normal. No scleral icterus.   Cardiovascular: Normal rate, regular rhythm and normal heart sounds. Exam reveals no gallop and no friction rub.   No murmur heard.  Pulmonary/Chest: Effort normal and breath sounds normal. No respiratory distress. He has no wheezes. He has no rales. "   Musculoskeletal: Normal range of motion.   Lymphadenopathy:     He has no cervical adenopathy.   Neurological: He is alert and oriented to person, place, and time. No cranial nerve deficit.   Skin: Skin is warm and dry. No rash noted.   Psychiatric: His behavior is normal. Judgment and thought content normal.   Nursing note and vitals reviewed.  Physical exam has been reviewed and validated on 07/15/2022 and updated with any changes.      Assessment & Plan   Diagnoses and all orders for this visit:    1. Medicare annual wellness visit, subsequent (Primary)    2. Type 2 diabetes mellitus with complication, without long-term current use of insulin (HCC)  -     Hemoglobin A1c; Future    3. Essential hypertension  -     Comprehensive Metabolic Panel; Future  -     T4, Free; Future  -     TSH; Future    4. Mixed hyperlipidemia  -     LDL Cholesterol, Direct; Future    5. Chronic heart failure with preserved ejection fraction (HCC)    6. Benign prostatic hyperplasia with lower urinary tract symptoms, symptom details unspecified  -     Ambulatory Referral to Urology    7. Iron deficiency anemia due to dietary causes  -     CBC & Differential; Future    8. Idiopathic chronic gout of right foot without tophus    9. Chronic renal impairment, stage 3a ([facility])    10. Gastroesophageal reflux disease with esophagitis without hemorrhage    11. Elevated PSA  -     PSA DIAGNOSTIC; Future    12. Araujo's esophagus without dysplasia  -     Ambulatory Referral to General Surgery    13. Dysphagia, unspecified type  -     Ambulatory Referral to General Surgery    Other orders  -     fluticasone (FLONASE) 50 MCG/ACT nasal spray; 2 sprays into the nostril(s) as directed by provider Daily As Needed for Rhinitis or Allergies.  Dispense: 16 g; Refill: 5  -     nystatin (MYCOSTATIN) 588596 UNIT/GM ointment; Apply  topically to the appropriate area as directed 3 (Three) Times a Day. Indications: Skin Infection due to Candida Yeast  Dispense:  30 g; Refill: 5         Labs are reviewed with patient.  His glucose is 129.  His hemoglobin A1c is 6.70. He may continue with dietary management of the diabetes for now.  I discussed dietary measures to help with control of the blood sugar.  He may monitor blood sugar 1 time daily.  His creatinine is baseline at 1.32.  His EGFR is 56.6.  He will continue to follow with nephrology.  His total cholesterol is 159, LDL 84 and triglycerides 237.  His HDL is 35.  He will continue with pravastatin 20 mg nightly for treatment of hyperlipidemia.  He will continue with Protonix 40 mg daily for treatment of GERD and Araujo's esophagus.  His blood pressure is controlled, and he will continue with his current blood pressure medication.  His anemia is stable with a hemoglobin of 12.6 and hematocrit of 38.2.  He will continue with the daily iron and B12 supplements for treatment of his anemia. He will continue with finasteride 5 mg daily for treatment of BPH.  He will continue with Xarelto 10 mg daily for anticoagulation secondary to prior history of DVT and pulmonary embolus.  He will continue with allopurinol 300 mg daily for gout prophylaxis.  His congestive heart failure is stable, and he will follow-up with cardiology as scheduled.    I will refer him back to Dr. Salmon secondary to the dysphagia, especially given the history of Araujo's esophagitis.  I will refer him to urology for further evaluation of his urinary symptoms.      PHQ-2/PHQ-9 Depression Screening 7/15/2022   Retired PHQ-9 Total Score -   Retired Total Score -   Little Interest or Pleasure in Doing Things 0-->not at all   Feeling Down, Depressed or Hopeless 0-->not at all   PHQ-9: Brief Depression Severity Measure Score 0         Lab on 06/29/2022   Component Date Value Ref Range Status   • Glucose 06/29/2022 129 (A) 70 - 99 mg/dL Final   • BUN 06/29/2022 40 (A) 7 - 23 mg/dL Final   • Creatinine 06/29/2022 1.32 (A) 0.70 - 1.30 mg/dL Final   • Sodium  06/29/2022 142  137 - 145 mmol/L Final   • Potassium 06/29/2022 4.6  3.4 - 5.0 mmol/L Final   • Chloride 06/29/2022 101  101 - 112 mmol/L Final   • CO2 06/29/2022 34.0 (A) 22.0 - 30.0 mmol/L Final   • Calcium 06/29/2022 9.3  8.4 - 10.2 mg/dL Final   • Total Protein 06/29/2022 7.2  6.3 - 8.6 g/dL Final   • Albumin 06/29/2022 4.00  3.50 - 5.00 g/dL Final   • ALT (SGPT) 06/29/2022 35  <=50 U/L Final   • AST (SGOT) 06/29/2022 45  17 - 59 U/L Final   • Alkaline Phosphatase 06/29/2022 87  38 - 126 U/L Final   • Total Bilirubin 06/29/2022 0.7  0.2 - 1.3 mg/dL Final   • Globulin 06/29/2022 3.2  2.3 - 3.5 gm/dL Final   • A/G Ratio 06/29/2022 1.3  1.1 - 1.8 g/dL Final   • BUN/Creatinine Ratio 06/29/2022 30.3 (A) 7.0 - 25.0 Final   • Anion Gap 06/29/2022 7.0  5.0 - 15.0 mmol/L Final   • eGFR 06/29/2022 56.6 (A) >60.0 mL/min/1.73 Final    National Kidney Foundation and American Society of Nephrology (ASN) Task Force recommended calculation based on the Chronic Kidney Disease Epidemiology Collaboration (CKD-EPI) equation refit without adjustment for race.   • Free T4 06/29/2022 1.40  0.93 - 1.70 ng/dL Final   • TSH 06/29/2022 3.180  0.270 - 4.200 uIU/mL Final   • Hemoglobin A1C 06/29/2022 6.70 (A) 4.80 - 5.60 % Final   • Total Cholesterol 06/29/2022 159  150 - 200 mg/dL Final   • Triglycerides 06/29/2022 237 (A) <=150 mg/dL Final   • HDL Cholesterol 06/29/2022 35 (A) 40 - 59 mg/dL Final   • LDL Cholesterol  06/29/2022 84  <=100 mg/dL Final   • VLDL Cholesterol 06/29/2022 40  5 - 40 mg/dL Final   • LDL/HDL Ratio 06/29/2022 2.19  0.00 - 3.55 Final   • Microalbumin/Creatinine Ratio 06/29/2022    Final    Unable to calculate   • Creatinine, Urine 06/29/2022 115.7  mg/dL Final   • Microalbumin, Urine 06/29/2022 <1.2  mg/dL Final   • WBC 06/29/2022 11.16 (A) 3.40 - 10.80 10*3/mm3 Final   • RBC 06/29/2022 3.66 (A) 4.14 - 5.80 10*6/mm3 Final   • Hemoglobin 06/29/2022 12.6 (A) 13.0 - 17.7 g/dL Final   • Hematocrit 06/29/2022 38.2  37.5 -  51.0 % Final   • MCV 06/29/2022 104.4 (A) 79.0 - 97.0 fL Final   • MCH 06/29/2022 34.4 (A) 26.6 - 33.0 pg Final   • MCHC 06/29/2022 33.0  31.5 - 35.7 g/dL Final   • RDW 06/29/2022 15.0  12.3 - 15.4 % Final   • RDW-SD 06/29/2022 57.1 (A) 37.0 - 54.0 fl Final   • MPV 06/29/2022 9.5  6.0 - 12.0 fL Final   • Platelets 06/29/2022 219  140 - 450 10*3/mm3 Final   • Neutrophil % 06/29/2022 57.0  42.7 - 76.0 % Final   • Lymphocyte % 06/29/2022 34.0  19.6 - 45.3 % Final   • Monocyte % 06/29/2022 7.0  5.0 - 12.0 % Final   • Eosinophil % 06/29/2022 2.0  0.3 - 6.2 % Final   • Neutrophils Absolute 06/29/2022 6.36  1.70 - 7.00 10*3/mm3 Final   • Lymphocytes Absolute 06/29/2022 3.79 (A) 0.70 - 3.10 10*3/mm3 Final   • Monocytes Absolute 06/29/2022 0.78  0.10 - 0.90 10*3/mm3 Final   • Eosinophils Absolute 06/29/2022 0.22  0.00 - 0.40 10*3/mm3 Final   • Macrocytes 06/29/2022 Slight/1+  None Seen Final   • WBC Morphology 06/29/2022 Normal  Normal Final   • Platelet Estimate 06/29/2022 Adequate  Normal Final   ]        .  .  .

## 2022-07-13 NOTE — PROGRESS NOTES
The ABCs of the Annual Wellness Visit  Subsequent Medicare Wellness Visit    Chief Complaint   Patient presents with   • Medicare Wellness-subsequent   • Hyperlipidemia   • Hypertension   • Type 2 diabetes mellitus with complication, without long-te   • Benign prostatic hyperplasia without lower urinary tract sy   • Iron deficiency anemia    • Gastroesophageal reflux disease with esophagitis without he   • Chronic renal impairment, stage 3a    • Idiopathic chronic gout        Subjective   History of Present Illness:  Noe Barnes Sr. is a 76 y.o. male who presents for a subsequent Medicare Wellness Visit.    HEALTH RISK ASSESSMENT    Recent Hospitalizations:  No hospitalization(s) within the last year.    Current Medical Providers:  Patient Care Team:  Elia Herron MD as PCP - General (Family Medicine)  Ben Recinos MD as Consulting Physician (Nephrology)  Ger Thomas MD as Surgeon (General Surgery)  Gabrielle Cao APRN (Dermatology)    Smoking Status:  Social History     Tobacco Use   Smoking Status Former Smoker   • Packs/day: 1.50   • Years: 60.00   • Pack years: 90.00   • Quit date: 2015   • Years since quittin.2   Smokeless Tobacco Never Used       Alcohol Consumption:  Social History     Substance and Sexual Activity   Alcohol Use No       Depression Screen:   PHQ-2/PHQ-9 Depression Screening 7/15/2022   Retired PHQ-9 Total Score -   Retired Total Score -   Little Interest or Pleasure in Doing Things 0-->not at all   Feeling Down, Depressed or Hopeless 0-->not at all   PHQ-9: Brief Depression Severity Measure Score 0       Fall Risk Screen:  SUGEY Fall Risk Assessment was completed, and patient is at HIGH risk for falls. Assessment completed on:7/15/2022    Health Habits and Functional and Cognitive Screening:  Functional & Cognitive Status 7/15/2022   Do you have difficulty preparing food and eating? No   Do you have difficulty bathing yourself, getting dressed or grooming  yourself? No   Do you have difficulty using the toilet? No   Do you have difficulty moving around from place to place? No   Do you have trouble with steps or getting out of a bed or a chair? No   Current Diet Frequent Junk Food   Dental Exam Not up to date   Eye Exam Not up to date   Exercise (times per week) 0 times per week   Current Exercises Include No Regular Exercise   Current Exercise Activities Include -   Do you need help using the phone?  No   Are you deaf or do you have serious difficulty hearing?  No   Do you need help with transportation? No   Do you need help shopping? No   Do you need help preparing meals?  No   Do you need help with housework?  No   Do you need help with laundry? No   Do you need help taking your medications? No   Do you need help managing money? No   Do you ever drive or ride in a car without wearing a seat belt? No   Have you felt unusual stress, anger or loneliness in the last month? No   Who do you live with? Spouse   If you need help, do you have trouble finding someone available to you? No   Have you been bothered in the last four weeks by sexual problems? No   Do you have difficulty concentrating, remembering or making decisions? Yes         Does the patient have evidence of cognitive impairment? No    Asprin use counseling:Takes Xarelto    Age-appropriate Screening Schedule:  Refer to the list below for future screening recommendations based on patient's age, sex and/or medical conditions. Orders for these recommended tests are listed in the plan section. The patient has been provided with a written plan.    Health Maintenance   Topic Date Due   • TDAP/TD VACCINES (1 - Tdap) Never done   • ZOSTER VACCINE (1 of 2) Never done   • DIABETIC FOOT EXAM  Never done   • DIABETIC EYE EXAM  Never done   • INFLUENZA VACCINE  10/01/2022   • PROSTATE CANCER SCREENING  12/27/2022   • HEMOGLOBIN A1C  12/29/2022   • LIPID PANEL  06/29/2023   • URINE MICROALBUMIN  06/29/2023          The  following portions of the patient's history were reviewed and updated as appropriate: allergies, current medications, past family history, past medical history, past social history, past surgical history and problem list.    Outpatient Medications Prior to Visit   Medication Sig Dispense Refill   • allopurinol (ZYLOPRIM) 300 MG tablet TAKE 1 TABLET BY MOUTH DAILY 30 tablet 0   • clobetasol prop emollient base (TEMOVATE) 0.05 % emollient cream Apply 1 application topically to the appropriate area as directed 2 (Two) Times a Day. 60 g 0   • Cyanocobalamin 2500 MCG sublingual tablet Place 2,500 mcg under the tongue Daily.     • ferrous sulfate 325 (65 FE) MG tablet Take 325 mg by mouth Daily With Breakfast.     • finasteride (PROSCAR) 5 MG tablet Take 1 tablet by mouth Daily. 90 tablet 3   • furosemide (LASIX) 20 MG tablet Take 1 tablet by mouth 2 (Two) Times a Day With Meals.     • hydrALAZINE (APRESOLINE) 50 MG tablet TAKE 1 TABLET BY MOUTH TWO TIMES A  tablet 2   • icosapent ethyl (VASCEPA) 1 g capsule capsule Take 2 capsules by mouth 2 (two) times a day.     • isosorbide mononitrate (IMDUR) 60 MG 24 hr tablet Take 60 mg by mouth Daily.  0   • losartan (COZAAR) 100 MG tablet TAKE 1 TABLET BY MOUTH DAILY 90 tablet 3   • magnesium oxide (MAGOX) 400 (241.3 Mg) MG tablet tablet Take 1 tablet by mouth Daily. 90 tablet 3   • metoprolol succinate XL (TOPROL-XL) 50 MG 24 hr tablet TAKE 1 TABLET BY MOUTH TWO TIMES A  tablet 3   • Omega-3 1000 MG capsule Take 1 capsule by mouth Daily.     • pantoprazole (PROTONIX) 40 MG EC tablet Take 1 tablet by mouth Daily. 90 tablet 1   • pravastatin (PRAVACHOL) 20 MG tablet TAKE 1 TABLET BY MOUTH EVERY EVENING 90 tablet 1   • rivaroxaban (XARELTO) 10 MG tablet Take 10 mg by mouth Daily.     • spironolactone (ALDACTONE) 25 MG tablet Take 1 tablet by mouth Daily. 90 tablet 1   • tolnaftate (TINACTIN) 1 % cream Apply 1 application topically 2 (Two) Times a Day.     •  "fluticasone (FLONASE) 50 MCG/ACT nasal spray 2 sprays into the nostril(s) as directed by provider Daily As Needed for Rhinitis or Allergies.     • nystatin (MYCOSTATIN) 232336 UNIT/GM ointment Apply  topically 3 (Three) Times a Day. 30 g 5     No facility-administered medications prior to visit.       Patient Active Problem List   Diagnosis   • Mixed hyperlipidemia   • Iron deficiency anemia due to dietary causes   • Gastroesophageal reflux disease with esophagitis without hemorrhage   • Essential hypertension   • Elevated PSA   • Idiopathic chronic gout of right foot without tophus   • History of adenomatous polyp of colon   • Yeast infection of the skin   • Peripheral edema   • Chronic renal impairment, stage 3a (HCC)   • Benign prostatic hyperplasia without lower urinary tract symptoms   • (HFpEF) heart failure with preserved ejection fraction (HCC)   • Morbidly obese (HCC)   • Palpitations   • Type 2 diabetes mellitus with complication, without long-term current use of insulin (HCC)   • Araujo's esophagus without dysplasia       Advanced Care Planning:  ACP discussion was held with the patient during this visit. Patient does not have an advance directive, declines further assistance.    Review of Systems    Compared to one year ago, the patient feels his physical health is worse.  Compared to one year ago, the patient feels his mental health is the same.    Reviewed chart for potential of high risk medication in the elderly: yes  Reviewed chart for potential of harmful drug interactions in the elderly:yes    Objective         Vitals:    07/15/22 0921   BP: 122/62   BP Location: Left arm   Patient Position: Sitting   Cuff Size: Large Adult   Pulse: 77  Comment: regular   Temp: 97.5 °F (36.4 °C)   TempSrc: Temporal   SpO2: 93%   Weight: 111 kg (245 lb)   Height: 172.7 cm (68\")   PainSc: 0-No pain       Body mass index is 37.25 kg/m².  Discussed the patient's BMI with him. The BMI is above average; BMI management " plan is completed.    Physical Exam    Lab Results   Component Value Date    TRIG 237 (H) 06/29/2022    HDL 35 (L) 06/29/2022    LDL 84 06/29/2022    VLDL 40 06/29/2022    HGBA1C 6.70 (H) 06/29/2022        Assessment & Plan   Medicare Risks and Personalized Health Plan  CMS Preventative Services Quick Reference  Cardiovascular risk  Diabetic Lab Screening   Immunizations Discussed/Encouraged (specific immunizations; adacel Tdap and Shingrix )  Obesity/Overweight   Prostate Cancer Screening   Recommend annual influenza vaccine.    Recommend COVID-19 vaccine booster dose  I discussed the importance of an annual diabetic eye exam    The above risks/problems have been discussed with the patient.  Pertinent information has been shared with the patient in the After Visit Summary.  Follow up plans and orders are seen below in the Assessment/Plan Section.    Diagnoses and all orders for this visit:    1. Medicare annual wellness visit, subsequent (Primary)    2. Type 2 diabetes mellitus with complication, without long-term current use of insulin (HCC)  -     Hemoglobin A1c; Future    3. Essential hypertension  -     Comprehensive Metabolic Panel; Future  -     T4, Free; Future  -     TSH; Future    4. Mixed hyperlipidemia  -     LDL Cholesterol, Direct; Future    5. Chronic heart failure with preserved ejection fraction (HCC)    6. Benign prostatic hyperplasia with lower urinary tract symptoms, symptom details unspecified  -     Ambulatory Referral to Urology    7. Iron deficiency anemia due to dietary causes  -     CBC & Differential; Future    8. Idiopathic chronic gout of right foot without tophus    9. Chronic renal impairment, stage 3a (HCC)    10. Gastroesophageal reflux disease with esophagitis without hemorrhage    11. Elevated PSA  -     PSA DIAGNOSTIC; Future    12. Araujo's esophagus without dysplasia  -     Ambulatory Referral to General Surgery    13. Dysphagia, unspecified type  -     Ambulatory Referral to  General Surgery    Other orders  -     fluticasone (FLONASE) 50 MCG/ACT nasal spray; 2 sprays into the nostril(s) as directed by provider Daily As Needed for Rhinitis or Allergies.  Dispense: 16 g; Refill: 5  -     nystatin (MYCOSTATIN) 661012 UNIT/GM ointment; Apply  topically to the appropriate area as directed 3 (Three) Times a Day. Indications: Skin Infection due to Candida Yeast  Dispense: 30 g; Refill: 5      Follow Up:  Return in about 6 months (around 1/15/2023) for with Provider of Choice, I will no longer be seeing patients after 08/29/22.     An After Visit Summary and PPPS were given to the patient.

## 2022-07-15 ENCOUNTER — OFFICE VISIT (OUTPATIENT)
Dept: FAMILY MEDICINE CLINIC | Facility: CLINIC | Age: 77
End: 2022-07-15

## 2022-07-15 VITALS
OXYGEN SATURATION: 93 % | HEIGHT: 68 IN | SYSTOLIC BLOOD PRESSURE: 122 MMHG | WEIGHT: 245 LBS | DIASTOLIC BLOOD PRESSURE: 62 MMHG | HEART RATE: 77 BPM | BODY MASS INDEX: 37.13 KG/M2 | TEMPERATURE: 97.5 F

## 2022-07-15 DIAGNOSIS — E78.2 MIXED HYPERLIPIDEMIA: Chronic | ICD-10-CM

## 2022-07-15 DIAGNOSIS — Z00.00 MEDICARE ANNUAL WELLNESS VISIT, SUBSEQUENT: Primary | ICD-10-CM

## 2022-07-15 DIAGNOSIS — K21.00 GASTROESOPHAGEAL REFLUX DISEASE WITH ESOPHAGITIS WITHOUT HEMORRHAGE: Chronic | ICD-10-CM

## 2022-07-15 DIAGNOSIS — D50.8 IRON DEFICIENCY ANEMIA DUE TO DIETARY CAUSES: Chronic | ICD-10-CM

## 2022-07-15 DIAGNOSIS — K22.70 BARRETT'S ESOPHAGUS WITHOUT DYSPLASIA: Chronic | ICD-10-CM

## 2022-07-15 DIAGNOSIS — M1A.0710 IDIOPATHIC CHRONIC GOUT OF RIGHT FOOT WITHOUT TOPHUS: Chronic | ICD-10-CM

## 2022-07-15 DIAGNOSIS — N18.31 CHRONIC RENAL IMPAIRMENT, STAGE 3A: Chronic | ICD-10-CM

## 2022-07-15 DIAGNOSIS — I10 ESSENTIAL HYPERTENSION: Chronic | ICD-10-CM

## 2022-07-15 DIAGNOSIS — E11.8 TYPE 2 DIABETES MELLITUS WITH COMPLICATION, WITHOUT LONG-TERM CURRENT USE OF INSULIN: Chronic | ICD-10-CM

## 2022-07-15 DIAGNOSIS — R13.10 DYSPHAGIA, UNSPECIFIED TYPE: ICD-10-CM

## 2022-07-15 DIAGNOSIS — R97.20 ELEVATED PSA: Chronic | ICD-10-CM

## 2022-07-15 DIAGNOSIS — N40.1 BENIGN PROSTATIC HYPERPLASIA WITH LOWER URINARY TRACT SYMPTOMS, SYMPTOM DETAILS UNSPECIFIED: ICD-10-CM

## 2022-07-15 DIAGNOSIS — I50.32 CHRONIC HEART FAILURE WITH PRESERVED EJECTION FRACTION: Chronic | ICD-10-CM

## 2022-07-15 PROCEDURE — 1159F MED LIST DOCD IN RCRD: CPT | Performed by: INTERNAL MEDICINE

## 2022-07-15 PROCEDURE — G0439 PPPS, SUBSEQ VISIT: HCPCS | Performed by: INTERNAL MEDICINE

## 2022-07-15 PROCEDURE — 96160 PT-FOCUSED HLTH RISK ASSMT: CPT | Performed by: INTERNAL MEDICINE

## 2022-07-15 PROCEDURE — 99214 OFFICE O/P EST MOD 30 MIN: CPT | Performed by: INTERNAL MEDICINE

## 2022-07-15 RX ORDER — NYSTATIN 100000 U/G
OINTMENT TOPICAL 3 TIMES DAILY
Qty: 30 G | Refills: 5 | Status: SHIPPED | OUTPATIENT
Start: 2022-07-15

## 2022-07-15 RX ORDER — FLUTICASONE PROPIONATE 50 MCG
2 SPRAY, SUSPENSION (ML) NASAL DAILY PRN
Qty: 16 G | Refills: 5 | Status: SHIPPED | OUTPATIENT
Start: 2022-07-15

## 2022-07-15 NOTE — PATIENT INSTRUCTIONS
Dr. Elia Herron will be leaving the clinic and retiring from clinical practice on 08/31/2022. You will need to establish care with a new primary care provider prior to that date. You should do this as soon as possible so you can schedule your next follow up appointment with a new provider.

## 2022-08-08 DIAGNOSIS — I10 ESSENTIAL HYPERTENSION: Chronic | ICD-10-CM

## 2022-08-08 RX ORDER — HYDRALAZINE HYDROCHLORIDE 50 MG/1
TABLET, FILM COATED ORAL
Qty: 180 TABLET | Refills: 2 | Status: SHIPPED | OUTPATIENT
Start: 2022-08-08

## 2022-08-29 DIAGNOSIS — M1A.0710 IDIOPATHIC CHRONIC GOUT OF RIGHT FOOT WITHOUT TOPHUS: Chronic | ICD-10-CM

## 2022-08-29 DIAGNOSIS — N18.30 CKD (CHRONIC KIDNEY DISEASE) STAGE 3, GFR 30-59 ML/MIN: Chronic | ICD-10-CM

## 2022-08-29 RX ORDER — LANOLIN ALCOHOL/MO/W.PET/CERES
CREAM (GRAM) TOPICAL
Qty: 90 TABLET | Refills: 0 | Status: SHIPPED | OUTPATIENT
Start: 2022-08-29

## 2022-08-29 RX ORDER — ALLOPURINOL 300 MG/1
300 TABLET ORAL DAILY
Qty: 30 TABLET | Refills: 0 | Status: SHIPPED | OUTPATIENT
Start: 2022-08-29 | End: 2022-09-06

## 2022-09-06 DIAGNOSIS — I50.32 CHRONIC HEART FAILURE WITH PRESERVED EJECTION FRACTION: Chronic | ICD-10-CM

## 2022-09-06 DIAGNOSIS — M1A.0710 IDIOPATHIC CHRONIC GOUT OF RIGHT FOOT WITHOUT TOPHUS: Chronic | ICD-10-CM

## 2022-09-06 RX ORDER — ALLOPURINOL 300 MG/1
300 TABLET ORAL DAILY
Qty: 30 TABLET | Refills: 0 | Status: SHIPPED | OUTPATIENT
Start: 2022-09-06

## 2022-09-08 RX ORDER — SPIRONOLACTONE 25 MG/1
25 TABLET ORAL DAILY
Qty: 90 TABLET | Refills: 1 | OUTPATIENT
Start: 2022-09-08

## 2022-09-26 DIAGNOSIS — K21.9 GASTROESOPHAGEAL REFLUX DISEASE WITHOUT ESOPHAGITIS: Chronic | ICD-10-CM

## 2022-09-26 RX ORDER — PANTOPRAZOLE SODIUM 40 MG/1
40 TABLET, DELAYED RELEASE ORAL DAILY
Qty: 90 TABLET | Refills: 1 | OUTPATIENT
Start: 2022-09-26

## 2023-01-03 DIAGNOSIS — K21.9 GASTROESOPHAGEAL REFLUX DISEASE WITHOUT ESOPHAGITIS: Chronic | ICD-10-CM

## 2023-01-03 DIAGNOSIS — N40.0 BENIGN PROSTATIC HYPERPLASIA WITHOUT LOWER URINARY TRACT SYMPTOMS: Chronic | ICD-10-CM

## 2023-01-03 RX ORDER — FINASTERIDE 5 MG/1
5 TABLET, FILM COATED ORAL DAILY
Qty: 90 TABLET | Refills: 3 | OUTPATIENT
Start: 2023-01-03

## 2023-01-03 RX ORDER — PANTOPRAZOLE SODIUM 40 MG/1
40 TABLET, DELAYED RELEASE ORAL DAILY
Qty: 90 TABLET | Refills: 1 | OUTPATIENT
Start: 2023-01-03

## 2023-01-09 DIAGNOSIS — K21.9 GASTROESOPHAGEAL REFLUX DISEASE WITHOUT ESOPHAGITIS: Chronic | ICD-10-CM

## 2023-01-09 RX ORDER — PANTOPRAZOLE SODIUM 40 MG/1
40 TABLET, DELAYED RELEASE ORAL DAILY
Qty: 90 TABLET | Refills: 1 | OUTPATIENT
Start: 2023-01-09

## 2023-04-10 DIAGNOSIS — I10 ESSENTIAL HYPERTENSION: Chronic | ICD-10-CM

## 2023-04-10 RX ORDER — LOSARTAN POTASSIUM 100 MG/1
100 TABLET ORAL DAILY
Qty: 90 TABLET | Refills: 3 | OUTPATIENT
Start: 2023-04-10

## 2023-04-10 RX ORDER — METOPROLOL SUCCINATE 50 MG/1
TABLET, EXTENDED RELEASE ORAL
Qty: 180 TABLET | Refills: 3 | OUTPATIENT
Start: 2023-04-10

## 2023-05-01 DIAGNOSIS — I10 ESSENTIAL HYPERTENSION: Chronic | ICD-10-CM

## 2023-05-01 RX ORDER — HYDRALAZINE HYDROCHLORIDE 50 MG/1
TABLET, FILM COATED ORAL
Qty: 180 TABLET | Refills: 2 | OUTPATIENT
Start: 2023-05-01

## (undated) DEVICE — CANN SMPL SOFTECH BIFLO ETCO2 A/M 7FT

## (undated) DEVICE — PAD GRND REM POLYHESIVE A/ DISP

## (undated) DEVICE — Device: Brand: DISPOSABLE ELECTROSURGICAL SNARE

## (undated) DEVICE — TRAP SXN POLYP QUICKCATCH LF

## (undated) DEVICE — SINGLE-USE BIOPSY FORCEPS: Brand: RADIAL JAW 4